# Patient Record
Sex: FEMALE | Race: BLACK OR AFRICAN AMERICAN | NOT HISPANIC OR LATINO | Employment: FULL TIME | URBAN - METROPOLITAN AREA
[De-identification: names, ages, dates, MRNs, and addresses within clinical notes are randomized per-mention and may not be internally consistent; named-entity substitution may affect disease eponyms.]

---

## 2019-02-11 ENCOUNTER — PATIENT OUTREACH (OUTPATIENT)
Dept: ADMINISTRATIVE | Facility: HOSPITAL | Age: 49
End: 2019-02-11

## 2019-02-15 ENCOUNTER — OFFICE VISIT (OUTPATIENT)
Dept: INTERNAL MEDICINE | Facility: CLINIC | Age: 49
End: 2019-02-15
Payer: COMMERCIAL

## 2019-02-15 VITALS
OXYGEN SATURATION: 98 % | DIASTOLIC BLOOD PRESSURE: 92 MMHG | HEIGHT: 62 IN | HEART RATE: 73 BPM | BODY MASS INDEX: 43.16 KG/M2 | TEMPERATURE: 97 F | SYSTOLIC BLOOD PRESSURE: 142 MMHG | WEIGHT: 234.56 LBS

## 2019-02-15 DIAGNOSIS — Z12.39 BREAST CANCER SCREENING: ICD-10-CM

## 2019-02-15 DIAGNOSIS — E66.01 MORBID OBESITY WITH BMI OF 40.0-44.9, ADULT: ICD-10-CM

## 2019-02-15 DIAGNOSIS — N96 HISTORY OF RECURRENT MISCARRIAGES: ICD-10-CM

## 2019-02-15 DIAGNOSIS — I10 HYPERTENSION GOAL BP (BLOOD PRESSURE) < 140/90: Primary | ICD-10-CM

## 2019-02-15 DIAGNOSIS — F17.200 TOBACCO USE DISORDER: ICD-10-CM

## 2019-02-15 DIAGNOSIS — Z23 IMMUNIZATION DUE: ICD-10-CM

## 2019-02-15 PROCEDURE — 3008F PR BODY MASS INDEX (BMI) DOCUMENTED: ICD-10-PCS | Mod: CPTII,S$GLB,, | Performed by: INTERNAL MEDICINE

## 2019-02-15 PROCEDURE — 3080F DIAST BP >= 90 MM HG: CPT | Mod: CPTII,S$GLB,, | Performed by: INTERNAL MEDICINE

## 2019-02-15 PROCEDURE — 99204 OFFICE O/P NEW MOD 45 MIN: CPT | Mod: 25,S$GLB,, | Performed by: INTERNAL MEDICINE

## 2019-02-15 PROCEDURE — 3077F PR MOST RECENT SYSTOLIC BLOOD PRESSURE >= 140 MM HG: ICD-10-PCS | Mod: CPTII,S$GLB,, | Performed by: INTERNAL MEDICINE

## 2019-02-15 PROCEDURE — 3080F PR MOST RECENT DIASTOLIC BLOOD PRESSURE >= 90 MM HG: ICD-10-PCS | Mod: CPTII,S$GLB,, | Performed by: INTERNAL MEDICINE

## 2019-02-15 PROCEDURE — 3008F BODY MASS INDEX DOCD: CPT | Mod: CPTII,S$GLB,, | Performed by: INTERNAL MEDICINE

## 2019-02-15 PROCEDURE — 99999 PR PBB SHADOW E&M-EST. PATIENT-LVL IV: ICD-10-PCS | Mod: PBBFAC,,, | Performed by: INTERNAL MEDICINE

## 2019-02-15 PROCEDURE — 90715 TDAP VACCINE 7 YRS/> IM: CPT | Mod: S$GLB,,, | Performed by: INTERNAL MEDICINE

## 2019-02-15 PROCEDURE — 90471 TDAP VACCINE GREATER THAN OR EQUAL TO 7YO IM: ICD-10-PCS | Mod: S$GLB,,, | Performed by: INTERNAL MEDICINE

## 2019-02-15 PROCEDURE — 90715 TDAP VACCINE GREATER THAN OR EQUAL TO 7YO IM: ICD-10-PCS | Mod: S$GLB,,, | Performed by: INTERNAL MEDICINE

## 2019-02-15 PROCEDURE — 90471 IMMUNIZATION ADMIN: CPT | Mod: S$GLB,,, | Performed by: INTERNAL MEDICINE

## 2019-02-15 PROCEDURE — 3077F SYST BP >= 140 MM HG: CPT | Mod: CPTII,S$GLB,, | Performed by: INTERNAL MEDICINE

## 2019-02-15 PROCEDURE — 99204 PR OFFICE/OUTPT VISIT, NEW, LEVL IV, 45-59 MIN: ICD-10-PCS | Mod: 25,S$GLB,, | Performed by: INTERNAL MEDICINE

## 2019-02-15 PROCEDURE — 99999 PR PBB SHADOW E&M-EST. PATIENT-LVL IV: CPT | Mod: PBBFAC,,, | Performed by: INTERNAL MEDICINE

## 2019-02-15 RX ORDER — HYDROCHLOROTHIAZIDE 25 MG/1
25 TABLET ORAL DAILY
Qty: 30 TABLET | Refills: 3 | Status: SHIPPED | OUTPATIENT
Start: 2019-02-15 | End: 2019-07-30 | Stop reason: SDUPTHER

## 2019-02-15 NOTE — PROGRESS NOTES
Subjective:       Patient ID: Latonya Blevins is a 48 y.o. female.    Chief Complaint: Establish Care    48 y.o. Black or  female     Patient presents with:  Establish Care    HPI: Here today to establish care. She is new to [facility].   HTN--she reports having hypertension for many years. She was on medication in the past but not currently. She is having headaches on occasion. She is a smoker and is obese. She admits to poor dieting.   She would like to see a gynecologist due to recurrent miscarriages. She reports having 7 miscarriages with the most recent being a month ago.     Past Medical History:  Hypertension    Past Surgical History:   SECTION  DILATION AND CURETTAGE OF UTERUS    Review of patient's family history indicates:  Problem: Brain cancer      Relation: Mother          Age of Onset: (Not Specified)  Problem: Breast cancer      Relation: Mother          Age of Onset: (Not Specified)  Problem: Lung cancer      Relation: Father          Age of Onset: (Not Specified)  Problem: Brain cancer      Relation: Father          Age of Onset: (Not Specified)      No current outpatient medications on file prior to visit.  No current facility-administered medications on file prior to visit.       Allergies:   Review of patient's allergies indicates:  No Known Allergies              Review of Systems   Constitutional: Positive for fatigue. Negative for fever and unexpected weight change.   HENT: Negative for sore throat, trouble swallowing and voice change.    Respiratory: Negative for cough and shortness of breath.    Cardiovascular: Negative for chest pain, palpitations and leg swelling.   Gastrointestinal: Negative for abdominal pain, blood in stool, constipation and diarrhea.   Genitourinary: Negative for difficulty urinating, dysuria and menstrual problem.   Musculoskeletal: Positive for back pain. Negative for arthralgias and gait problem.   Neurological: Positive for headaches.  Negative for dizziness.   Psychiatric/Behavioral: Negative for dysphoric mood and sleep disturbance. The patient is not nervous/anxious.        Objective:      Physical Exam   Constitutional: She is oriented to person, place, and time. She appears well-developed and well-nourished. No distress.   HENT:   Mouth/Throat: No oropharyngeal exudate.   Eyes: No scleral icterus.   Neck: No tracheal deviation present. No thyromegaly present.   Cardiovascular: Normal rate, regular rhythm and normal heart sounds.   Pulmonary/Chest: Effort normal and breath sounds normal. No respiratory distress. She has no wheezes. She has no rales.   Abdominal: Soft. Bowel sounds are normal.   Musculoskeletal: She exhibits no edema.   Lymphadenopathy:     She has no cervical adenopathy.   Neurological: She is alert and oriented to person, place, and time.   Skin: Skin is warm and dry.   Hirsutism on chin    Psychiatric: She has a normal mood and affect.   Vitals reviewed.      Assessment:       1. Hypertension goal BP (blood pressure) < 140/90    2. Tobacco use disorder    3. Morbid obesity with BMI of 40.0-44.9, adult    4. History of recurrent miscarriages    5. Breast cancer screening    6. Immunization due        Plan:       Latonya was seen today for establish care.    Diagnoses and all orders for this visit:    Hypertension goal BP (blood pressure) < 140/90  -     hydroCHLOROthiazide (HYDRODIURIL) 25 MG tablet; Take 1 tablet (25 mg total) by mouth once daily. Discussed medication risks and benefits.   -     Recommend smoking cessation  -     Lifestyle modifications discussed  -     Comprehensive metabolic panel; Future  -     TSH; Future  -     CBC auto differential; Future  -     Lipid panel; Future    Tobacco use disorder  -     Recommend cessation    Morbid obesity with BMI of 40.0-44.9, adult  -     Lifestyle modifications discussed    History of recurrent miscarriages  -     Ambulatory consult to Gynecology    Breast cancer  screening  -     Mammo Digital Screening Bilat; Future    Immunization due  -     (In Office Administered) Tdap Vaccine    Obtain Pap smear report.     RTC in 2 weeks for b/p recheck.

## 2019-02-15 NOTE — PATIENT INSTRUCTIONS
Hydrochlorothiazide, HCTZ capsules or tablets  What is this medicine?  HYDROCHLOROTHIAZIDE (kyara droe klor oh THYE a zide) is a diuretic. It increases the amount of urine passed, which causes the body to lose salt and water. This medicine is used to treat high blood pressure. It is also reduces the swelling and water retention caused by various medical conditions, such as heart, liver, or kidney disease.  How should I use this medicine?  Take this medicine by mouth with a glass of water. Follow the directions on the prescription label. Take your medicine at regular intervals. Remember that you will need to pass urine frequently after taking this medicine. Do not take your doses at a time of day that will cause you problems. Do not stop taking your medicine unless your doctor tells you to.  Talk to your pediatrician regarding the use of this medicine in children. Special care may be needed.  What side effects may I notice from receiving this medicine?  Side effects that you should report to your doctor or health care professional as soon as possible:  · allergic reactions such as skin rash or itching, hives, swelling of the lips, mouth, tongue, or throat  · changes in vision  · chest pain  · eye pain  · fast or irregular heartbeat  · feeling faint or lightheaded, falls  · gout attack  · muscle pain or cramps  · pain or difficulty when passing urine  · pain, tingling, numbness in the hands or feet  · redness, blistering, peeling or loosening of the skin, including inside the mouth  · unusually weak or tired  Side effects that usually do not require medical attention (report to your doctor or health care professional if they continue or are bothersome):  · change in sex drive or performance  · dry mouth  · headache  · stomach upset  What may interact with this medicine?  · cholestyramine  · colestipol  · digoxin  · dofetilide  · lithium  · medicines for blood pressure  · medicines for diabetes  · medicines that relax  muscles for surgery  · other diuretics  · steroid medicines like prednisone or cortisone  What if I miss a dose?  If you miss a dose, take it as soon as you can. If it is almost time for your next dose, take only that dose. Do not take double or extra doses.  Where should I keep my medicine?  Keep out of the reach of children.  Store at room temperature between 15 and 30 degrees C (59 and 86 degrees F). Do not freeze. Protect from light and moisture. Keep container closed tightly. Throw away any unused medicine after the expiration date.  What should I tell my health care provider before I take this medicine?  They need to know if you have any of these conditions:  · diabetes  · gout  · immune system problems, like lupus  · kidney disease or kidney stones  · liver disease  · pancreatitis  · small amount of urine or difficulty passing urine  · an unusual or allergic reaction to hydrochlorothiazide, sulfa drugs, other medicines, foods, dyes, or preservatives  · pregnant or trying to get pregnant  · breast-feeding  What should I watch for while using this medicine?  Visit your doctor or health care professional for regular checks on your progress. Check your blood pressure as directed. Ask your doctor or health care professional what your blood pressure should be and when you should contact him or her.  You may need to be on a special diet while taking this medicine. Ask your doctor.  Check with your doctor or health care professional if you get an attack of severe diarrhea, nausea and vomiting, or if you sweat a lot. The loss of too much body fluid can make it dangerous for you to take this medicine.  You may get drowsy or dizzy. Do not drive, use machinery, or do anything that needs mental alertness until you know how this medicine affects you. Do not stand or sit up quickly, especially if you are an older patient. This reduces the risk of dizzy or fainting spells. Alcohol may interfere with the effect of this  medicine. Avoid alcoholic drinks.  This medicine may affect your blood sugar level. If you have diabetes, check with your doctor or health care professional before changing the dose of your diabetic medicine.  This medicine can make you more sensitive to the sun. Keep out of the sun. If you cannot avoid being in the sun, wear protective clothing and use sunscreen. Do not use sun lamps or tanning beds/booths.  NOTE:This sheet is a summary. It may not cover all possible information. If you have questions about this medicine, talk to your doctor, pharmacist, or health care provider. Copyright© 2017 [employer]        Uncontrolled High Blood Pressure (Established)    Your blood pressure was unusually high today. This can occur if youve missed doses of your blood pressure medicine. Or it can happen if you are taking other medicines. These include some asthma inhalers, decongestants, diet pills, and street drugs like cocaine and amphetamine.  Other causes include:  · Weight gain  · More salt in your diet  · Smoking  · Caffeine  Your blood pressure can also rise if you are emotionally upset or in intense pain. It may go back to normal after a period of rest.  A blood pressure reading is made up of 2 numbers. There is a top number over a bottom number. The top number is the systolic pressure. The bottom number is the diastolic pressure. A normal blood pressure is a systolic pressure of less than 120 over a diastolic pressure of less than 80. High blood pressure (hypertension) is when the top number is 140 or higher. Or it is when the bottom number is 90 or higher. You will see your blood pressure readings written together. For example, a person with a systolic pressure of 118 and a diastolic pressure of 78 will have 118/78 written in the medical record. To be high blood pressure, the numbers must be higher when tested over a period of time. The blood pressures between normal and hypertension are called prehypertension.  Prehypertension is a warning sign. The information gives you a chance to make lifestyle changes (weight loss, more exercise) that can keep your blood pressure from going higher.  Home care  Its important to take steps to lower your blood pressure. If you are taking blood pressure medicine, the guidelines below may help you need less or no medicines in the future.  · Begin a weight-loss program if you are overweight.  · Cut back on the amount of salt in your diet:  ¨ Avoid high-salt foods like olives, pickles, smoked meats, and salted potato chips.  ¨ Dont add salt to your food at the table.  ¨ Use only small amounts of salt when cooking.  · Begin an exercise program. Talk with your health care provider about what exercise program is best for you. It doesnt have to be difficult. Even brisk walking for 20 minutes 3 times a week is a good form of exercise.  · Avoid medicines that stimulates the heart. This includes many over-the-counter cold and sinus decongestant pills and sprays, as well as diet pills. Check the warnings about hypertension on the label. Before purchasing any over-the-counter medicines or supplements, always ask the pharmacist about the product's potential interaction with your high blood pressure and your medicines.  · Stimulants such as amphetamine or cocaine could be lethal for someone with hypertension. Never take these.  · Limit how much caffeine you drink. Or switch to noncaffeinated beverages.  · Stop smoking. If you are a long-time smoker, this can be hard. Enroll in a stop-smoking program to make it more likely that you will succeed. Talk with your provider about ways to quit.  · Learn how to handle stress better. This is an important part of any program to lower blood pressure. Learn ways to relax. These include meditation, yoga, and biofeedback.  · If medicines were prescribed, take them exactly as directed. Missing doses may cause your blood pressure to get out of control.  · If you  miss a dose or doses of your medicines, check with your healthcare provider or pharmacist about what to do.  · Consider buying an automatic blood pressure machine. Your provider may recommend a certain type. You can get one of these at most pharmacies. Measure your blood pressure twice a day, in the morning, and in the late afternoon. Keep a written record of your home blood pressure readings and take the record to your medical appointments.  Here are some additional guidelines on home blood pressure monitoring from the American [employer] Association.  · Don't smoke or drink coffee for 30 minutes  · Go to the bathroom before the test.  · Relax for 5 minutes before taking the measurement.  · Sit correctly. Be sure your back is supported. Don't sit on a couch or soft chair. Uncross your feet and place them flat on the floor. Place your arm on a solid, flat surface like a table with the upper arm at heart level. Make certain the middle of the cuff is directly above the eye of the elbow. Check the monitor's instruction manual for an illustration.  · Take multiple readings. When you measure, take 2 or 3 readings one minute apart and record all of the results.  · Take your blood pressure at the same time every day, or as your healthcare provider recommends.  · Record the date, time, and blood pressure reading.  · Take the record with you to your next appointment. If your blood pressure monitor has a built-in memory, simply take the monitor with you to your next appointment.  · Call your provider if you have several high readings. Don't be frightened by a single high reading, but if you get several high readings, check in with your healthcare provider.  · Note: When blood pressure reaches a systolic (top number) of 180 or higher or a diastolic (bottom number) of 110 or higher, emergency medical treatment is required. Call your healthcare provider immediately.  Follow-up care  Regular visits to your own healthcare provider for  blood pressure and medicine checks are an important part of your care. Make a follow-up appointment as directed. Bring the record of your home blood pressure readings to the appointment.  When to seek medical advice  Call your healthcare provider right away if any of these occur:  · Blood pressure reaches a systolic (top number) of 180 or higher or diastolic (bottom number) of 110 or higher, emergency medical treatment is required.  · Chest, arm, shoulder, neck, or upper back pain  · Shortness of breath  · Severe headache  · Throbbing or rushing sound in the ears  · Nosebleed  · Extreme drowsiness, confusion, or fainting  · Dizziness or dizziness with spinning sensation (vertigo)  · Weakness in an arm or leg or on one side of the face  · Trouble speaking or seeing   Date Last Reviewed: 1/1/2017  © 3862-4095 [employer]. [address] [place] [address], [place] [address]. All rights reserved. This information is not intended as a substitute for professional medical care. Always follow your healthcare professional's instructions.

## 2019-03-19 ENCOUNTER — LAB VISIT (OUTPATIENT)
Dept: LAB | Facility: HOSPITAL | Age: 49
End: 2019-03-19
Attending: INTERNAL MEDICINE
Payer: COMMERCIAL

## 2019-03-19 DIAGNOSIS — I10 HYPERTENSION GOAL BP (BLOOD PRESSURE) < 140/90: ICD-10-CM

## 2019-03-19 LAB
ALBUMIN SERPL BCP-MCNC: 3.7 G/DL
ALP SERPL-CCNC: 76 U/L
ALT SERPL W/O P-5'-P-CCNC: 11 U/L
ANION GAP SERPL CALC-SCNC: 7 MMOL/L
AST SERPL-CCNC: 14 U/L
BASOPHILS # BLD AUTO: 0.03 K/UL
BASOPHILS NFR BLD: 0.4 %
BILIRUB SERPL-MCNC: 0.4 MG/DL
BUN SERPL-MCNC: 11 MG/DL
CALCIUM SERPL-MCNC: 9.5 MG/DL
CHLORIDE SERPL-SCNC: 109 MMOL/L
CHOLEST SERPL-MCNC: 153 MG/DL
CHOLEST/HDLC SERPL: 3.6 {RATIO}
CO2 SERPL-SCNC: 23 MMOL/L
CREAT SERPL-MCNC: 0.9 MG/DL
DIFFERENTIAL METHOD: NORMAL
EOSINOPHIL # BLD AUTO: 0.2 K/UL
EOSINOPHIL NFR BLD: 2.6 %
ERYTHROCYTE [DISTWIDTH] IN BLOOD BY AUTOMATED COUNT: 14.5 %
EST. GFR  (AFRICAN AMERICAN): >60 ML/MIN/1.73 M^2
EST. GFR  (NON AFRICAN AMERICAN): >60 ML/MIN/1.73 M^2
GLUCOSE SERPL-MCNC: 119 MG/DL
HCT VFR BLD AUTO: 37.5 %
HDLC SERPL-MCNC: 42 MG/DL
HDLC SERPL: 27.5 %
HGB BLD-MCNC: 12.3 G/DL
LDLC SERPL CALC-MCNC: 93 MG/DL
LYMPHOCYTES # BLD AUTO: 2.1 K/UL
LYMPHOCYTES NFR BLD: 25.5 %
MCH RBC QN AUTO: 28.8 PG
MCHC RBC AUTO-ENTMCNC: 32.8 G/DL
MCV RBC AUTO: 88 FL
MONOCYTES # BLD AUTO: 0.5 K/UL
MONOCYTES NFR BLD: 5.7 %
NEUTROPHILS # BLD AUTO: 5.3 K/UL
NEUTROPHILS NFR BLD: 65.8 %
NONHDLC SERPL-MCNC: 111 MG/DL
PLATELET # BLD AUTO: 238 K/UL
PMV BLD AUTO: 11.1 FL
POTASSIUM SERPL-SCNC: 4.5 MMOL/L
PROT SERPL-MCNC: 6.8 G/DL
RBC # BLD AUTO: 4.27 M/UL
SODIUM SERPL-SCNC: 139 MMOL/L
TRIGL SERPL-MCNC: 90 MG/DL
TSH SERPL DL<=0.005 MIU/L-ACNC: 1.74 UIU/ML
WBC # BLD AUTO: 8.07 K/UL

## 2019-03-19 PROCEDURE — 84443 ASSAY THYROID STIM HORMONE: CPT

## 2019-03-19 PROCEDURE — 36415 COLL VENOUS BLD VENIPUNCTURE: CPT

## 2019-03-19 PROCEDURE — 80053 COMPREHEN METABOLIC PANEL: CPT

## 2019-03-19 PROCEDURE — 85025 COMPLETE CBC W/AUTO DIFF WBC: CPT

## 2019-03-19 PROCEDURE — 80061 LIPID PANEL: CPT

## 2019-03-22 ENCOUNTER — TELEPHONE (OUTPATIENT)
Dept: INTERNAL MEDICINE | Facility: CLINIC | Age: 49
End: 2019-03-22

## 2019-03-22 DIAGNOSIS — R73.01 IMPAIRED FASTING GLUCOSE: Primary | ICD-10-CM

## 2019-04-29 ENCOUNTER — OFFICE VISIT (OUTPATIENT)
Dept: INTERNAL MEDICINE | Facility: CLINIC | Age: 49
End: 2019-04-29
Payer: COMMERCIAL

## 2019-04-29 VITALS
BODY MASS INDEX: 43.49 KG/M2 | SYSTOLIC BLOOD PRESSURE: 132 MMHG | OXYGEN SATURATION: 98 % | TEMPERATURE: 97 F | HEIGHT: 62 IN | DIASTOLIC BLOOD PRESSURE: 82 MMHG | WEIGHT: 236.31 LBS | HEART RATE: 81 BPM

## 2019-04-29 DIAGNOSIS — R73.01 IMPAIRED FASTING GLUCOSE: ICD-10-CM

## 2019-04-29 DIAGNOSIS — I10 HYPERTENSION GOAL BP (BLOOD PRESSURE) < 140/90: Primary | ICD-10-CM

## 2019-04-29 DIAGNOSIS — R41.3 MEMORY PROBLEM: ICD-10-CM

## 2019-04-29 DIAGNOSIS — E66.01 MORBID OBESITY WITH BMI OF 40.0-44.9, ADULT: ICD-10-CM

## 2019-04-29 PROCEDURE — 99214 PR OFFICE/OUTPT VISIT, EST, LEVL IV, 30-39 MIN: ICD-10-PCS | Mod: S$GLB,,, | Performed by: INTERNAL MEDICINE

## 2019-04-29 PROCEDURE — 3075F SYST BP GE 130 - 139MM HG: CPT | Mod: CPTII,S$GLB,, | Performed by: INTERNAL MEDICINE

## 2019-04-29 PROCEDURE — 99999 PR PBB SHADOW E&M-EST. PATIENT-LVL III: ICD-10-PCS | Mod: PBBFAC,,, | Performed by: INTERNAL MEDICINE

## 2019-04-29 PROCEDURE — 3008F PR BODY MASS INDEX (BMI) DOCUMENTED: ICD-10-PCS | Mod: CPTII,S$GLB,, | Performed by: INTERNAL MEDICINE

## 2019-04-29 PROCEDURE — 3008F BODY MASS INDEX DOCD: CPT | Mod: CPTII,S$GLB,, | Performed by: INTERNAL MEDICINE

## 2019-04-29 PROCEDURE — 3075F PR MOST RECENT SYSTOLIC BLOOD PRESS GE 130-139MM HG: ICD-10-PCS | Mod: CPTII,S$GLB,, | Performed by: INTERNAL MEDICINE

## 2019-04-29 PROCEDURE — 3079F DIAST BP 80-89 MM HG: CPT | Mod: CPTII,S$GLB,, | Performed by: INTERNAL MEDICINE

## 2019-04-29 PROCEDURE — 3079F PR MOST RECENT DIASTOLIC BLOOD PRESSURE 80-89 MM HG: ICD-10-PCS | Mod: CPTII,S$GLB,, | Performed by: INTERNAL MEDICINE

## 2019-04-29 PROCEDURE — 99999 PR PBB SHADOW E&M-EST. PATIENT-LVL III: CPT | Mod: PBBFAC,,, | Performed by: INTERNAL MEDICINE

## 2019-04-29 PROCEDURE — 99214 OFFICE O/P EST MOD 30 MIN: CPT | Mod: S$GLB,,, | Performed by: INTERNAL MEDICINE

## 2019-04-29 NOTE — PROGRESS NOTES
Latonya Mendoza August  48 y.o. Black or  female    HPI: Presents to the clinic to follow up on hypertension. Her b/p is controlled on HCTZ. She states she gets higher readings at home. She does not have her cuff with her today.   Review of recent labs show that her glucose was 119. She denies a history of diabetes. She denies a family history of diabetes. She denies symptoms.   She has trouble with her short term memory. She is concerned. She admits to being under a lot of stress. Recent TSH was within normal range.     Past Medical History:   Diagnosis Date    Hypertension        Current Outpatient Medications:     hydroCHLOROthiazide (HYDRODIURIL) 25 MG tablet, Take 1 tablet (25 mg total) by mouth once daily., Disp: 30 tablet, Rfl: 3    Review of patient's allergies indicates:  No Known Allergies    ROS: Denies dizziness, headache, chest pain, shortness of breath or edema    PE:  GEN: Alert and oriented, no acute distress  HEART: Normal S1 and S2, RRR, no lower extremity edema  LUNGS: Respirations unlabored, bilaterally clear to auscultation    ASSESSMENT/PLAN:  Latonya was seen today for hypertension.    Diagnoses and all orders for this visit:    Hypertension goal BP (blood pressure) < 140/90  -     Continue HCTZ    Impaired fasting glucose  -     Glucose, fasting; Future    Memory problem  -     Vitamin B12; Future  -     RPR; Future    Morbid obesity with BMI of 40.0-44.9, adult  -     Lifestyle modifications discussed    RTC in 2 weeks for b/p cuff check and labs.

## 2019-04-30 ENCOUNTER — PATIENT MESSAGE (OUTPATIENT)
Dept: OBSTETRICS AND GYNECOLOGY | Facility: CLINIC | Age: 49
End: 2019-04-30

## 2019-05-13 ENCOUNTER — CLINICAL SUPPORT (OUTPATIENT)
Dept: INTERNAL MEDICINE | Facility: CLINIC | Age: 49
End: 2019-05-13
Payer: COMMERCIAL

## 2019-05-13 ENCOUNTER — LAB VISIT (OUTPATIENT)
Dept: LAB | Facility: HOSPITAL | Age: 49
End: 2019-05-13
Payer: COMMERCIAL

## 2019-05-13 VITALS — HEART RATE: 73 BPM | DIASTOLIC BLOOD PRESSURE: 106 MMHG | SYSTOLIC BLOOD PRESSURE: 144 MMHG

## 2019-05-13 DIAGNOSIS — R73.01 IMPAIRED FASTING GLUCOSE: ICD-10-CM

## 2019-05-13 DIAGNOSIS — R41.3 MEMORY PROBLEM: ICD-10-CM

## 2019-05-13 DIAGNOSIS — I10 HYPERTENSION, UNSPECIFIED TYPE: Primary | ICD-10-CM

## 2019-05-13 LAB
ESTIMATED AVG GLUCOSE: 111 MG/DL (ref 68–131)
GLUCOSE SERPL-MCNC: 100 MG/DL (ref 70–110)
HBA1C MFR BLD HPLC: 5.5 % (ref 4–5.6)
VIT B12 SERPL-MCNC: 235 PG/ML (ref 210–950)

## 2019-05-13 PROCEDURE — 36415 COLL VENOUS BLD VENIPUNCTURE: CPT

## 2019-05-13 PROCEDURE — 82607 VITAMIN B-12: CPT

## 2019-05-13 PROCEDURE — 83036 HEMOGLOBIN GLYCOSYLATED A1C: CPT

## 2019-05-13 PROCEDURE — 86592 SYPHILIS TEST NON-TREP QUAL: CPT

## 2019-05-13 PROCEDURE — 82947 ASSAY GLUCOSE BLOOD QUANT: CPT

## 2019-05-13 PROCEDURE — 99999 PR PBB SHADOW E&M-EST. PATIENT-LVL I: CPT | Mod: PBBFAC,,,

## 2019-05-13 PROCEDURE — 99999 PR PBB SHADOW E&M-EST. PATIENT-LVL I: ICD-10-PCS | Mod: PBBFAC,,,

## 2019-05-13 NOTE — PATIENT INSTRUCTIONS
Spoke to Dr Laguerre about pt b/p, pt states she has no symptoms, was told to take meds, bring b/p cuff and come back in 1 week to get a recheck of b/p, verbalized understanding.

## 2019-05-13 NOTE — PROGRESS NOTES
BP:144/106    Patient stated that she hasn't taken her medication this morning due to having blood work.

## 2019-05-14 LAB — RPR SER QL: NORMAL

## 2019-05-21 ENCOUNTER — CLINICAL SUPPORT (OUTPATIENT)
Dept: INTERNAL MEDICINE | Facility: CLINIC | Age: 49
End: 2019-05-21
Payer: COMMERCIAL

## 2019-05-21 ENCOUNTER — OFFICE VISIT (OUTPATIENT)
Dept: INTERNAL MEDICINE | Facility: CLINIC | Age: 49
End: 2019-05-21
Payer: COMMERCIAL

## 2019-05-21 VITALS
BODY MASS INDEX: 43.23 KG/M2 | SYSTOLIC BLOOD PRESSURE: 144 MMHG | HEART RATE: 84 BPM | OXYGEN SATURATION: 98 % | TEMPERATURE: 98 F | DIASTOLIC BLOOD PRESSURE: 106 MMHG | HEART RATE: 83 BPM | HEIGHT: 62 IN | SYSTOLIC BLOOD PRESSURE: 154 MMHG | DIASTOLIC BLOOD PRESSURE: 112 MMHG

## 2019-05-21 DIAGNOSIS — J32.9 SINUSITIS, UNSPECIFIED CHRONICITY, UNSPECIFIED LOCATION: ICD-10-CM

## 2019-05-21 DIAGNOSIS — I10 HYPERTENSION GOAL BP (BLOOD PRESSURE) < 140/90: Primary | ICD-10-CM

## 2019-05-21 PROCEDURE — 99999 PR PBB SHADOW E&M-EST. PATIENT-LVL III: ICD-10-PCS | Mod: PBBFAC,,, | Performed by: PHYSICIAN ASSISTANT

## 2019-05-21 PROCEDURE — 99214 PR OFFICE/OUTPT VISIT, EST, LEVL IV, 30-39 MIN: ICD-10-PCS | Mod: S$GLB,,, | Performed by: PHYSICIAN ASSISTANT

## 2019-05-21 PROCEDURE — 3077F PR MOST RECENT SYSTOLIC BLOOD PRESSURE >= 140 MM HG: ICD-10-PCS | Mod: CPTII,S$GLB,, | Performed by: PHYSICIAN ASSISTANT

## 2019-05-21 PROCEDURE — 99999 PR PBB SHADOW E&M-EST. PATIENT-LVL II: CPT | Mod: PBBFAC,,,

## 2019-05-21 PROCEDURE — 99214 OFFICE O/P EST MOD 30 MIN: CPT | Mod: S$GLB,,, | Performed by: PHYSICIAN ASSISTANT

## 2019-05-21 PROCEDURE — 3077F SYST BP >= 140 MM HG: CPT | Mod: CPTII,S$GLB,, | Performed by: PHYSICIAN ASSISTANT

## 2019-05-21 PROCEDURE — 3008F PR BODY MASS INDEX (BMI) DOCUMENTED: ICD-10-PCS | Mod: CPTII,S$GLB,, | Performed by: PHYSICIAN ASSISTANT

## 2019-05-21 PROCEDURE — 3080F PR MOST RECENT DIASTOLIC BLOOD PRESSURE >= 90 MM HG: ICD-10-PCS | Mod: CPTII,S$GLB,, | Performed by: PHYSICIAN ASSISTANT

## 2019-05-21 PROCEDURE — 3080F DIAST BP >= 90 MM HG: CPT | Mod: CPTII,S$GLB,, | Performed by: PHYSICIAN ASSISTANT

## 2019-05-21 PROCEDURE — 99999 PR PBB SHADOW E&M-EST. PATIENT-LVL II: ICD-10-PCS | Mod: PBBFAC,,,

## 2019-05-21 PROCEDURE — 3008F BODY MASS INDEX DOCD: CPT | Mod: CPTII,S$GLB,, | Performed by: PHYSICIAN ASSISTANT

## 2019-05-21 PROCEDURE — 99999 PR PBB SHADOW E&M-EST. PATIENT-LVL III: CPT | Mod: PBBFAC,,, | Performed by: PHYSICIAN ASSISTANT

## 2019-05-21 RX ORDER — LABETALOL 100 MG/1
100 TABLET, FILM COATED ORAL 2 TIMES DAILY
Qty: 60 TABLET | Refills: 11 | Status: SHIPPED | OUTPATIENT
Start: 2019-05-21 | End: 2019-06-28

## 2019-05-21 RX ORDER — AMOXICILLIN AND CLAVULANATE POTASSIUM 875; 125 MG/1; MG/1
1 TABLET, FILM COATED ORAL 2 TIMES DAILY
Qty: 20 TABLET | Refills: 0 | Status: SHIPPED | OUTPATIENT
Start: 2019-05-21 | End: 2019-05-31

## 2019-05-21 NOTE — PROGRESS NOTES
Patient pressure is still high. She is having sinus issues and headaches. She will see Mr. Ellis for an appt now.

## 2019-05-21 NOTE — PROGRESS NOTES
Subjective:       Patient ID: Latonya Blevins is a 48 y.o. female.    Chief Complaint: Headache (PCP - [facility]); Sinus Problem; and Hypertension    Hypertension   This is a chronic problem. The current episode started more than 1 year ago. The problem has been waxing and waning since onset. The problem is uncontrolled. Associated symptoms include anxiety and headaches. Pertinent negatives include no blurred vision, chest pain, malaise/fatigue, neck pain, orthopnea, palpitations, peripheral edema, PND, shortness of breath or sweats. Risk factors for coronary artery disease include family history, obesity and stress. Past treatments include diuretics. The current treatment provides mild improvement. Compliance problems include diet and exercise.    Sinusitis   This is a new problem. The current episode started in the past 7 days. The problem has been gradually worsening since onset. There has been no fever. Associated symptoms include congestion, coughing, headaches, sinus pressure and a sore throat. Pertinent negatives include no chills, neck pain or shortness of breath. Past treatments include nothing.     Past Medical History:   Diagnosis Date    Hypertension        Review of Systems   Constitutional: Negative for chills, fatigue, fever and malaise/fatigue.   HENT: Positive for congestion, postnasal drip, sinus pressure, sinus pain and sore throat.    Eyes: Negative for blurred vision.   Respiratory: Positive for cough. Negative for chest tightness and shortness of breath.    Cardiovascular: Negative for chest pain, palpitations, orthopnea and PND.   Gastrointestinal: Negative for abdominal pain, constipation and nausea.   Musculoskeletal: Negative for neck pain.   Neurological: Positive for headaches.       Objective:      Physical Exam   Constitutional: She appears well-developed and well-nourished. No distress.   HENT:   Head: Normocephalic and atraumatic.   Right Ear: Tympanic membrane and ear canal  normal.   Left Ear: Tympanic membrane and ear canal normal.   Nose: Mucosal edema and rhinorrhea present.   Mouth/Throat: Uvula is midline, oropharynx is clear and moist and mucous membranes are normal. No tonsillar exudate.   Neck: Neck supple.   Cardiovascular: Normal rate and regular rhythm. Exam reveals no gallop and no friction rub.   No murmur heard.  Pulmonary/Chest: Effort normal and breath sounds normal. No stridor. No respiratory distress. She has no wheezes. She has no rales. She exhibits no tenderness.   Abdominal: Soft. There is no tenderness.   Lymphadenopathy:     She has no cervical adenopathy.   Skin: She is not diaphoretic.   Nursing note and vitals reviewed.      Assessment:       1. Hypertension goal BP (blood pressure) < 140/90    2. Sinusitis, unspecified chronicity, unspecified location        Plan:       Hypertension goal BP (blood pressure) < 140/90 expresses the desire to get pregnant.   labetalol (NORMODYNE) 100 MG tablet; Take 1 tablet (100 mg total) by mouth 2 (two) times daily.  Dispense: 60 tablet; Refill: 11    Sinusitis, unspecified chronicity, unspecified location       -     amoxicillin-clavulanate 875-125mg (AUGMENTIN) 875-125 mg per tablet; Take 1 tablet by mouth 2 (two) times daily. for 10 days  Dispense: 20 tablet; Refill: 0

## 2019-06-28 ENCOUNTER — OFFICE VISIT (OUTPATIENT)
Dept: INTERNAL MEDICINE | Facility: CLINIC | Age: 49
End: 2019-06-28
Payer: COMMERCIAL

## 2019-06-28 VITALS
WEIGHT: 241.19 LBS | OXYGEN SATURATION: 99 % | HEIGHT: 62 IN | HEART RATE: 87 BPM | BODY MASS INDEX: 44.38 KG/M2 | SYSTOLIC BLOOD PRESSURE: 142 MMHG | DIASTOLIC BLOOD PRESSURE: 88 MMHG | TEMPERATURE: 97 F

## 2019-06-28 DIAGNOSIS — I10 HYPERTENSION GOAL BP (BLOOD PRESSURE) < 140/90: Primary | ICD-10-CM

## 2019-06-28 DIAGNOSIS — E66.01 MORBID OBESITY WITH BMI OF 40.0-44.9, ADULT: ICD-10-CM

## 2019-06-28 DIAGNOSIS — R41.3 MEMORY PROBLEM: ICD-10-CM

## 2019-06-28 PROCEDURE — 99999 PR PBB SHADOW E&M-EST. PATIENT-LVL III: ICD-10-PCS | Mod: PBBFAC,,, | Performed by: INTERNAL MEDICINE

## 2019-06-28 PROCEDURE — 3079F DIAST BP 80-89 MM HG: CPT | Mod: CPTII,S$GLB,, | Performed by: INTERNAL MEDICINE

## 2019-06-28 PROCEDURE — 3008F PR BODY MASS INDEX (BMI) DOCUMENTED: ICD-10-PCS | Mod: CPTII,S$GLB,, | Performed by: INTERNAL MEDICINE

## 2019-06-28 PROCEDURE — 3008F BODY MASS INDEX DOCD: CPT | Mod: CPTII,S$GLB,, | Performed by: INTERNAL MEDICINE

## 2019-06-28 PROCEDURE — 3079F PR MOST RECENT DIASTOLIC BLOOD PRESSURE 80-89 MM HG: ICD-10-PCS | Mod: CPTII,S$GLB,, | Performed by: INTERNAL MEDICINE

## 2019-06-28 PROCEDURE — 3077F PR MOST RECENT SYSTOLIC BLOOD PRESSURE >= 140 MM HG: ICD-10-PCS | Mod: CPTII,S$GLB,, | Performed by: INTERNAL MEDICINE

## 2019-06-28 PROCEDURE — 99214 OFFICE O/P EST MOD 30 MIN: CPT | Mod: S$GLB,,, | Performed by: INTERNAL MEDICINE

## 2019-06-28 PROCEDURE — 3077F SYST BP >= 140 MM HG: CPT | Mod: CPTII,S$GLB,, | Performed by: INTERNAL MEDICINE

## 2019-06-28 PROCEDURE — 99999 PR PBB SHADOW E&M-EST. PATIENT-LVL III: CPT | Mod: PBBFAC,,, | Performed by: INTERNAL MEDICINE

## 2019-06-28 PROCEDURE — 99214 PR OFFICE/OUTPT VISIT, EST, LEVL IV, 30-39 MIN: ICD-10-PCS | Mod: S$GLB,,, | Performed by: INTERNAL MEDICINE

## 2019-06-28 RX ORDER — LABETALOL 200 MG/1
200 TABLET, FILM COATED ORAL 2 TIMES DAILY
Qty: 60 TABLET | Refills: 3 | Status: SHIPPED | OUTPATIENT
Start: 2019-06-28 | End: 2019-07-30 | Stop reason: SDUPTHER

## 2019-06-28 RX ORDER — LANOLIN ALCOHOL/MO/W.PET/CERES
1000 CREAM (GRAM) TOPICAL DAILY
COMMUNITY
Start: 2019-06-28 | End: 2020-08-25

## 2019-06-28 NOTE — PATIENT INSTRUCTIONS
Uncontrolled High Blood Pressure (Established)    Your blood pressure was unusually high today. This can occur if youve missed doses of your blood pressure medicine. Or it can happen if you are taking other medicines. These include some asthma inhalers, decongestants, diet pills, and street drugs like cocaine and amphetamine.  Other causes include:  · Weight gain  · More salt in your diet  · Smoking  · Caffeine  Your blood pressure can also rise if you are emotionally upset or in intense pain. It may go back to normal after a period of rest.  A blood pressure reading is made up of 2 numbers. There is a top number over a bottom number. The top number is the systolic pressure. The bottom number is the diastolic pressure. A normal blood pressure is a systolic pressure of less than 120 over a diastolic pressure of less than 80. High blood pressure (hypertension) is when the top number is 140 or higher. Or it is when the bottom number is 90 or higher. You will see your blood pressure readings written together. For example, a person with a systolic pressure of 118 and a diastolic pressure of 78 will have 118/78 written in the medical record. To be high blood pressure, the numbers must be higher when tested over a period of time. The blood pressures between normal and hypertension are called prehypertension. Prehypertension is a warning sign. The information gives you a chance to make lifestyle changes (weight loss, more exercise) that can keep your blood pressure from going higher.  Home care  Its important to take steps to lower your blood pressure. If you are taking blood pressure medicine, the guidelines below may help you need less or no medicines in the future.  · Begin a weight-loss program if you are overweight.  · Cut back on the amount of salt in your diet:  ¨ Avoid high-salt foods like olives, pickles, smoked meats, and salted potato chips.  ¨ Dont add salt to your food at the table.  ¨ Use only small  amounts of salt when cooking.  · Begin an exercise program. Talk with your health care provider about what exercise program is best for you. It doesnt have to be difficult. Even brisk walking for 20 minutes 3 times a week is a good form of exercise.  · Avoid medicines that stimulates the heart. This includes many over-the-counter cold and sinus decongestant pills and sprays, as well as diet pills. Check the warnings about hypertension on the label. Before purchasing any over-the-counter medicines or supplements, always ask the pharmacist about the product's potential interaction with your high blood pressure and your medicines.  · Stimulants such as amphetamine or cocaine could be lethal for someone with hypertension. Never take these.  · Limit how much caffeine you drink. Or switch to noncaffeinated beverages.  · Stop smoking. If you are a long-time smoker, this can be hard. Enroll in a stop-smoking program to make it more likely that you will succeed. Talk with your provider about ways to quit.  · Learn how to handle stress better. This is an important part of any program to lower blood pressure. Learn ways to relax. These include meditation, yoga, and biofeedback.  · If medicines were prescribed, take them exactly as directed. Missing doses may cause your blood pressure to get out of control.  · If you miss a dose or doses of your medicines, check with your healthcare provider or pharmacist about what to do.  · Consider buying an automatic blood pressure machine. Your provider may recommend a certain type. You can get one of these at most pharmacies. Measure your blood pressure twice a day, in the morning, and in the late afternoon. Keep a written record of your home blood pressure readings and take the record to your medical appointments.  Here are some additional guidelines on home blood pressure monitoring from the American [employer] Association.  · Don't smoke or drink coffee for 30 minutes  · Go to the bathroom  before the test.  · Relax for 5 minutes before taking the measurement.  · Sit correctly. Be sure your back is supported. Don't sit on a couch or soft chair. Uncross your feet and place them flat on the floor. Place your arm on a solid, flat surface like a table with the upper arm at heart level. Make certain the middle of the cuff is directly above the eye of the elbow. Check the monitor's instruction manual for an illustration.  · Take multiple readings. When you measure, take 2 or 3 readings one minute apart and record all of the results.  · Take your blood pressure at the same time every day, or as your healthcare provider recommends.  · Record the date, time, and blood pressure reading.  · Take the record with you to your next appointment. If your blood pressure monitor has a built-in memory, simply take the monitor with you to your next appointment.  · Call your provider if you have several high readings. Don't be frightened by a single high reading, but if you get several high readings, check in with your healthcare provider.  · Note: When blood pressure reaches a systolic (top number) of 180 or higher or a diastolic (bottom number) of 110 or higher, emergency medical treatment is required. Call your healthcare provider immediately.  Follow-up care  Regular visits to your own healthcare provider for blood pressure and medicine checks are an important part of your care. Make a follow-up appointment as directed. Bring the record of your home blood pressure readings to the appointment.  When to seek medical advice  Call your healthcare provider right away if any of these occur:  · Blood pressure reaches a systolic (top number) of 180 or higher or diastolic (bottom number) of 110 or higher, emergency medical treatment is required.  · Chest, arm, shoulder, neck, or upper back pain  · Shortness of breath  · Severe headache  · Throbbing or rushing sound in the ears  · Nosebleed  · Extreme drowsiness, confusion, or  fainting  · Dizziness or dizziness with spinning sensation (vertigo)  · Weakness in an arm or leg or on one side of the face  · Trouble speaking or seeing   Date Last Reviewed: 1/1/2017  © 8411-8694 [employer]. [address] [place] [address], [place] [address]. All rights reserved. This information is not intended as a substitute for professional medical care. Always follow your healthcare professional's instructions.

## 2019-06-28 NOTE — PROGRESS NOTES
Latonya Mendoza August  48 y.o. Black or  female    HPI: Presents to the clinic to follow up on hypertension. Her b/p is elevated both here and at home. She is taking labetalol 100 mg BID. She is not taking HCTZ. She states she is trying to get pregnant.   She continues to have memory problems. Her TSH was normal and her B 12 is low normal.   Lab Results   Component Value Date    TSH 1.739 03/19/2019     Lab Results   Component Value Date    AGMLLNEM39 235 05/13/2019       Past Medical History:   Diagnosis Date    Hypertension        Current Outpatient Medications:     labetalol (NORMODYNE) 200 MG tablet, Take 1 tablet (200 mg total) by mouth 2 (two) times daily., Disp: 60 tablet, Rfl: 3    cyanocobalamin (VITAMIN B-12) 1000 MCG tablet, Take 1 tablet (1,000 mcg total) by mouth once daily., Disp: , Rfl:     hydroCHLOROthiazide (HYDRODIURIL) 25 MG tablet, Take 1 tablet (25 mg total) by mouth once daily., Disp: 30 tablet, Rfl: 3    Review of patient's allergies indicates:  No Known Allergies    ROS: Denies blurred vision, dizziness, headache, chest pain, shortness of breath or edema    PE:  GEN: Alert and oriented, no acute distress  HEART: Normal S1 and S2, RRR, no lower extremity edema  LUNGS: Respirations unlabored, bilaterally clear to auscultation    ASSESSMENT/PLAN:  Latonya was seen today for hypertension.    Diagnoses and all orders for this visit:    Hypertension goal BP (blood pressure) < 140/90  -     Increase labetalol (NORMODYNE) 200 MG tablet; Take 1 tablet (200 mg total) by mouth 2 (two) times daily.    Memory problem  -     cyanocobalamin (VITAMIN B-12) 1000 MCG tablet; Take 1 tablet (1,000 mcg total) by mouth once daily.    Morbid obesity with BMI of 40.0-44.9, adult  -     Lifestyle modifications discussed    RTC in 4 weeks for HTN and memory problem.

## 2019-07-09 ENCOUNTER — PATIENT OUTREACH (OUTPATIENT)
Dept: ADMINISTRATIVE | Facility: HOSPITAL | Age: 49
End: 2019-07-09

## 2019-07-30 ENCOUNTER — OFFICE VISIT (OUTPATIENT)
Dept: INTERNAL MEDICINE | Facility: CLINIC | Age: 49
End: 2019-07-30
Payer: COMMERCIAL

## 2019-07-30 VITALS
TEMPERATURE: 97 F | SYSTOLIC BLOOD PRESSURE: 180 MMHG | DIASTOLIC BLOOD PRESSURE: 100 MMHG | WEIGHT: 237.44 LBS | HEIGHT: 62 IN | OXYGEN SATURATION: 100 % | BODY MASS INDEX: 43.69 KG/M2 | HEART RATE: 84 BPM

## 2019-07-30 DIAGNOSIS — R41.3 MEMORY PROBLEM: ICD-10-CM

## 2019-07-30 DIAGNOSIS — I10 HYPERTENSION GOAL BP (BLOOD PRESSURE) < 140/90: Primary | ICD-10-CM

## 2019-07-30 PROCEDURE — 99999 PR PBB SHADOW E&M-EST. PATIENT-LVL III: ICD-10-PCS | Mod: PBBFAC,,, | Performed by: INTERNAL MEDICINE

## 2019-07-30 PROCEDURE — 3080F PR MOST RECENT DIASTOLIC BLOOD PRESSURE >= 90 MM HG: ICD-10-PCS | Mod: CPTII,S$GLB,, | Performed by: INTERNAL MEDICINE

## 2019-07-30 PROCEDURE — 3077F PR MOST RECENT SYSTOLIC BLOOD PRESSURE >= 140 MM HG: ICD-10-PCS | Mod: CPTII,S$GLB,, | Performed by: INTERNAL MEDICINE

## 2019-07-30 PROCEDURE — 99214 PR OFFICE/OUTPT VISIT, EST, LEVL IV, 30-39 MIN: ICD-10-PCS | Mod: S$GLB,,, | Performed by: INTERNAL MEDICINE

## 2019-07-30 PROCEDURE — 3008F BODY MASS INDEX DOCD: CPT | Mod: CPTII,S$GLB,, | Performed by: INTERNAL MEDICINE

## 2019-07-30 PROCEDURE — 99999 PR PBB SHADOW E&M-EST. PATIENT-LVL III: CPT | Mod: PBBFAC,,, | Performed by: INTERNAL MEDICINE

## 2019-07-30 PROCEDURE — 3008F PR BODY MASS INDEX (BMI) DOCUMENTED: ICD-10-PCS | Mod: CPTII,S$GLB,, | Performed by: INTERNAL MEDICINE

## 2019-07-30 PROCEDURE — 3080F DIAST BP >= 90 MM HG: CPT | Mod: CPTII,S$GLB,, | Performed by: INTERNAL MEDICINE

## 2019-07-30 PROCEDURE — 99214 OFFICE O/P EST MOD 30 MIN: CPT | Mod: S$GLB,,, | Performed by: INTERNAL MEDICINE

## 2019-07-30 PROCEDURE — 3077F SYST BP >= 140 MM HG: CPT | Mod: CPTII,S$GLB,, | Performed by: INTERNAL MEDICINE

## 2019-07-30 RX ORDER — HYDROCHLOROTHIAZIDE 25 MG/1
25 TABLET ORAL DAILY
Qty: 30 TABLET | Refills: 3 | Status: SHIPPED | OUTPATIENT
Start: 2019-07-30 | End: 2022-07-28

## 2019-07-30 RX ORDER — LABETALOL 200 MG/1
200 TABLET, FILM COATED ORAL 2 TIMES DAILY
Qty: 60 TABLET | Refills: 3 | Status: SHIPPED | OUTPATIENT
Start: 2019-07-30 | End: 2020-08-25

## 2019-07-30 NOTE — PROGRESS NOTES
Subjective:       Patient ID: Latonya Blevins is a 48 y.o. female.    Chief Complaint: Hypertension    Hypertension   This is a recurrent problem. The current episode started more than 1 year ago. The problem has been gradually worsening since onset. The problem is resistant. Associated symptoms include anxiety, blurred vision, headaches, malaise/fatigue, orthopnea, palpitations and shortness of breath. Pertinent negatives include no neck pain or PND. Agents associated with hypertension include decongestants and NSAIDs. Risk factors for coronary artery disease include obesity, smoking/tobacco exposure and stress. Past treatments include nothing. The current treatment provides no improvement. There are no compliance problems.  There is no history of angina or CAD/MI. There is no history of chronic renal disease.   She has been out of labetalol for two days. She has not been taking HCTZ.   She needs refills.     She states her memory has improved since starting OTC vitamin B 12.     Review of Systems   Constitutional: Positive for malaise/fatigue.   Eyes: Positive for blurred vision.   Respiratory: Positive for shortness of breath.    Cardiovascular: Positive for palpitations and orthopnea. Negative for PND.   Musculoskeletal: Positive for arthralgias. Negative for neck pain.   Neurological: Positive for headaches.       Objective:      Physical Exam   Constitutional: She is oriented to person, place, and time. She appears well-developed and well-nourished. No distress.   Eyes: No scleral icterus.   Neck: No tracheal deviation present.   Cardiovascular: Normal rate and regular rhythm.   Pulmonary/Chest: Effort normal. No respiratory distress.   Neurological: She is alert and oriented to person, place, and time.   Skin: Skin is warm and dry.   Psychiatric: She has a normal mood and affect.   Vitals reviewed.      Assessment:       1. Hypertension goal BP (blood pressure) < 140/90    2. Memory problem        Plan:        Latonya was seen today for hypertension.    Diagnoses and all orders for this visit:    Hypertension goal BP (blood pressure) < 140/90  -     Counseled regarding medication compliance  -     Refill hydroCHLOROthiazide (HYDRODIURIL) 25 MG tablet; Take 1 tablet (25 mg total) by mouth once daily.  -     Refill labetalol (NORMODYNE) 200 MG tablet; Take 1 tablet (200 mg total) by mouth 2 (two) times daily.  -     Lifestyle modifications discussed    Memory problem  -     Continue vitamin B 12    RTC in 2 weeks.

## 2019-07-30 NOTE — PATIENT INSTRUCTIONS
Uncontrolled High Blood Pressure (Established)    Your blood pressure was unusually high today. This can occur if youve missed doses of your blood pressure medicine. Or it can happen if you are taking other medicines. These include some asthma inhalers, decongestants, diet pills, and street drugs like cocaine and amphetamine.  Other causes include:  · Weight gain  · More salt in your diet  · Smoking  · Caffeine  Your blood pressure can also rise if you are emotionally upset or in intense pain. It may go back to normal after a period of rest.  A blood pressure reading is made up of 2 numbers. There is a top number over a bottom number. The top number is the systolic pressure. The bottom number is the diastolic pressure. A normal blood pressure is a systolic pressure of less than 120 over a diastolic pressure of less than 80. High blood pressure (hypertension) is when the top number is 140 or higher. Or it is when the bottom number is 90 or higher. You will see your blood pressure readings written together. For example, a person with a systolic pressure of 118 and a diastolic pressure of 78 will have 118/78 written in the medical record. To be high blood pressure, the numbers must be higher when tested over a period of time. The blood pressures between normal and hypertension are called prehypertension. Prehypertension is a warning sign. The information gives you a chance to make lifestyle changes (weight loss, more exercise) that can keep your blood pressure from going higher.  Home care  Its important to take steps to lower your blood pressure. If you are taking blood pressure medicine, the guidelines below may help you need less or no medicines in the future.  · Begin a weight-loss program if you are overweight.  · Cut back on the amount of salt in your diet:  ¨ Avoid high-salt foods like olives, pickles, smoked meats, and salted potato chips.  ¨ Dont add salt to your food at the table.  ¨ Use only small  amounts of salt when cooking.  · Begin an exercise program. Talk with your health care provider about what exercise program is best for you. It doesnt have to be difficult. Even brisk walking for 20 minutes 3 times a week is a good form of exercise.  · Avoid medicines that stimulates the heart. This includes many over-the-counter cold and sinus decongestant pills and sprays, as well as diet pills. Check the warnings about hypertension on the label. Before purchasing any over-the-counter medicines or supplements, always ask the pharmacist about the product's potential interaction with your high blood pressure and your medicines.  · Stimulants such as amphetamine or cocaine could be lethal for someone with hypertension. Never take these.  · Limit how much caffeine you drink. Or switch to noncaffeinated beverages.  · Stop smoking. If you are a long-time smoker, this can be hard. Enroll in a stop-smoking program to make it more likely that you will succeed. Talk with your provider about ways to quit.  · Learn how to handle stress better. This is an important part of any program to lower blood pressure. Learn ways to relax. These include meditation, yoga, and biofeedback.  · If medicines were prescribed, take them exactly as directed. Missing doses may cause your blood pressure to get out of control.  · If you miss a dose or doses of your medicines, check with your healthcare provider or pharmacist about what to do.  · Consider buying an automatic blood pressure machine. Your provider may recommend a certain type. You can get one of these at most pharmacies. Measure your blood pressure twice a day, in the morning, and in the late afternoon. Keep a written record of your home blood pressure readings and take the record to your medical appointments.  Here are some additional guidelines on home blood pressure monitoring from the American [employer] Association.  · Don't smoke or drink coffee for 30 minutes  · Go to the bathroom  before the test.  · Relax for 5 minutes before taking the measurement.  · Sit correctly. Be sure your back is supported. Don't sit on a couch or soft chair. Uncross your feet and place them flat on the floor. Place your arm on a solid, flat surface like a table with the upper arm at heart level. Make certain the middle of the cuff is directly above the eye of the elbow. Check the monitor's instruction manual for an illustration.  · Take multiple readings. When you measure, take 2 or 3 readings one minute apart and record all of the results.  · Take your blood pressure at the same time every day, or as your healthcare provider recommends.  · Record the date, time, and blood pressure reading.  · Take the record with you to your next appointment. If your blood pressure monitor has a built-in memory, simply take the monitor with you to your next appointment.  · Call your provider if you have several high readings. Don't be frightened by a single high reading, but if you get several high readings, check in with your healthcare provider.  · Note: When blood pressure reaches a systolic (top number) of 180 or higher or a diastolic (bottom number) of 110 or higher, emergency medical treatment is required. Call your healthcare provider immediately.  Follow-up care  Regular visits to your own healthcare provider for blood pressure and medicine checks are an important part of your care. Make a follow-up appointment as directed. Bring the record of your home blood pressure readings to the appointment.  When to seek medical advice  Call your healthcare provider right away if any of these occur:  · Blood pressure reaches a systolic (top number) of 180 or higher or diastolic (bottom number) of 110 or higher, emergency medical treatment is required.  · Chest, arm, shoulder, neck, or upper back pain  · Shortness of breath  · Severe headache  · Throbbing or rushing sound in the ears  · Nosebleed  · Extreme drowsiness, confusion, or  fainting  · Dizziness or dizziness with spinning sensation (vertigo)  · Weakness in an arm or leg or on one side of the face  · Trouble speaking or seeing   Date Last Reviewed: 1/1/2017  © 3152-4818 [employer]. [address] [place] [address], [place] [address]. All rights reserved. This information is not intended as a substitute for professional medical care. Always follow your healthcare professional's instructions.

## 2019-10-27 ENCOUNTER — HOSPITAL ENCOUNTER (EMERGENCY)
Facility: HOSPITAL | Age: 49
Discharge: HOME | End: 2019-10-27
Attending: EMERGENCY MEDICINE
Payer: COMMERCIAL

## 2019-10-27 VITALS
DIASTOLIC BLOOD PRESSURE: 99 MMHG | BODY MASS INDEX: 43.57 KG/M2 | HEIGHT: 62 IN | TEMPERATURE: 98 F | SYSTOLIC BLOOD PRESSURE: 186 MMHG | RESPIRATION RATE: 18 BRPM | OXYGEN SATURATION: 100 % | WEIGHT: 236.75 LBS | HEART RATE: 67 BPM

## 2019-10-27 DIAGNOSIS — M43.6 TORTICOLLIS, ACUTE: Primary | ICD-10-CM

## 2019-10-27 DIAGNOSIS — R07.9 CHEST PAIN: ICD-10-CM

## 2019-10-27 LAB
ALBUMIN SERPL BCP-MCNC: 3.4 G/DL (ref 3.5–5.2)
ALP SERPL-CCNC: 73 U/L (ref 55–135)
ALT SERPL W/O P-5'-P-CCNC: 8 U/L (ref 10–44)
ANION GAP SERPL CALC-SCNC: 10 MMOL/L (ref 8–16)
AST SERPL-CCNC: 15 U/L (ref 10–40)
BASOPHILS # BLD AUTO: 0.05 K/UL (ref 0–0.2)
BASOPHILS NFR BLD: 0.6 % (ref 0–1.9)
BILIRUB SERPL-MCNC: 0.4 MG/DL (ref 0.1–1)
BNP SERPL-MCNC: 139 PG/ML (ref 0–99)
BUN SERPL-MCNC: 14 MG/DL (ref 6–20)
CALCIUM SERPL-MCNC: 9.1 MG/DL (ref 8.7–10.5)
CHLORIDE SERPL-SCNC: 105 MMOL/L (ref 95–110)
CO2 SERPL-SCNC: 23 MMOL/L (ref 23–29)
CREAT SERPL-MCNC: 0.8 MG/DL (ref 0.5–1.4)
DIFFERENTIAL METHOD: ABNORMAL
EOSINOPHIL # BLD AUTO: 0.2 K/UL (ref 0–0.5)
EOSINOPHIL NFR BLD: 2.6 % (ref 0–8)
ERYTHROCYTE [DISTWIDTH] IN BLOOD BY AUTOMATED COUNT: 16.8 % (ref 11.5–14.5)
EST. GFR  (AFRICAN AMERICAN): >60 ML/MIN/1.73 M^2
EST. GFR  (NON AFRICAN AMERICAN): >60 ML/MIN/1.73 M^2
GLUCOSE SERPL-MCNC: 104 MG/DL (ref 70–110)
HCT VFR BLD AUTO: 28.9 % (ref 37–48.5)
HGB BLD-MCNC: 9.3 G/DL (ref 12–16)
HIV 1+2 AB+HIV1 P24 AG SERPL QL IA: NEGATIVE
IMM GRANULOCYTES # BLD AUTO: 0.02 K/UL (ref 0–0.04)
IMM GRANULOCYTES NFR BLD AUTO: 0.2 % (ref 0–0.5)
LYMPHOCYTES # BLD AUTO: 1.7 K/UL (ref 1–4.8)
LYMPHOCYTES NFR BLD: 20.9 % (ref 18–48)
MCH RBC QN AUTO: 26.3 PG (ref 27–31)
MCHC RBC AUTO-ENTMCNC: 32.2 G/DL (ref 32–36)
MCV RBC AUTO: 82 FL (ref 82–98)
MONOCYTES # BLD AUTO: 0.7 K/UL (ref 0.3–1)
MONOCYTES NFR BLD: 8 % (ref 4–15)
NEUTROPHILS # BLD AUTO: 5.5 K/UL (ref 1.8–7.7)
NEUTROPHILS NFR BLD: 67.7 % (ref 38–73)
NRBC BLD-RTO: 0 /100 WBC
PLATELET # BLD AUTO: 256 K/UL (ref 150–350)
PMV BLD AUTO: 12 FL (ref 9.2–12.9)
POTASSIUM SERPL-SCNC: 3.5 MMOL/L (ref 3.5–5.1)
PROT SERPL-MCNC: 6.4 G/DL (ref 6–8.4)
RBC # BLD AUTO: 3.54 M/UL (ref 4–5.4)
SODIUM SERPL-SCNC: 138 MMOL/L (ref 136–145)
TROPONIN I SERPL DL<=0.01 NG/ML-MCNC: 0.01 NG/ML (ref 0–0.03)
WBC # BLD AUTO: 8.13 K/UL (ref 3.9–12.7)

## 2019-10-27 PROCEDURE — 93010 ELECTROCARDIOGRAM REPORT: CPT | Mod: ,,, | Performed by: INTERNAL MEDICINE

## 2019-10-27 PROCEDURE — 25000003 PHARM REV CODE 250: Performed by: EMERGENCY MEDICINE

## 2019-10-27 PROCEDURE — 96375 TX/PRO/DX INJ NEW DRUG ADDON: CPT | Mod: 59

## 2019-10-27 PROCEDURE — 25000003 PHARM REV CODE 250: Performed by: PHYSICIAN ASSISTANT

## 2019-10-27 PROCEDURE — 86703 HIV-1/HIV-2 1 RESULT ANTBDY: CPT

## 2019-10-27 PROCEDURE — 93010 EKG 12-LEAD: ICD-10-PCS | Mod: ,,, | Performed by: INTERNAL MEDICINE

## 2019-10-27 PROCEDURE — 83880 ASSAY OF NATRIURETIC PEPTIDE: CPT

## 2019-10-27 PROCEDURE — 96374 THER/PROPH/DIAG INJ IV PUSH: CPT

## 2019-10-27 PROCEDURE — 84484 ASSAY OF TROPONIN QUANT: CPT

## 2019-10-27 PROCEDURE — 85025 COMPLETE CBC W/AUTO DIFF WBC: CPT

## 2019-10-27 PROCEDURE — 99285 EMERGENCY DEPT VISIT HI MDM: CPT | Mod: 25

## 2019-10-27 PROCEDURE — 80053 COMPREHEN METABOLIC PANEL: CPT

## 2019-10-27 PROCEDURE — 63600175 PHARM REV CODE 636 W HCPCS: Performed by: EMERGENCY MEDICINE

## 2019-10-27 PROCEDURE — 93005 ELECTROCARDIOGRAM TRACING: CPT

## 2019-10-27 RX ORDER — HYDROCODONE BITARTRATE AND ACETAMINOPHEN 10; 325 MG/1; MG/1
1 TABLET ORAL EVERY 6 HOURS PRN
Qty: 9 TABLET | Refills: 0 | Status: SHIPPED | OUTPATIENT
Start: 2019-10-27 | End: 2020-08-25

## 2019-10-27 RX ORDER — CYCLOBENZAPRINE HCL 10 MG
10 TABLET ORAL
Status: COMPLETED | OUTPATIENT
Start: 2019-10-27 | End: 2019-10-27

## 2019-10-27 RX ORDER — HYDROMORPHONE HYDROCHLORIDE 2 MG/ML
1 INJECTION, SOLUTION INTRAMUSCULAR; INTRAVENOUS; SUBCUTANEOUS
Status: COMPLETED | OUTPATIENT
Start: 2019-10-27 | End: 2019-10-27

## 2019-10-27 RX ORDER — ASPIRIN 325 MG
325 TABLET ORAL
Status: COMPLETED | OUTPATIENT
Start: 2019-10-27 | End: 2019-10-27

## 2019-10-27 RX ORDER — CYCLOBENZAPRINE HCL 10 MG
5 TABLET ORAL 3 TIMES DAILY PRN
Qty: 15 TABLET | Refills: 0 | Status: SHIPPED | OUTPATIENT
Start: 2019-10-27 | End: 2022-07-28

## 2019-10-27 RX ORDER — KETOROLAC TROMETHAMINE 30 MG/ML
30 INJECTION, SOLUTION INTRAMUSCULAR; INTRAVENOUS
Status: COMPLETED | OUTPATIENT
Start: 2019-10-27 | End: 2019-10-27

## 2019-10-27 RX ADMIN — ASPIRIN 325 MG ORAL TABLET 325 MG: 325 PILL ORAL at 10:10

## 2019-10-27 RX ADMIN — KETOROLAC TROMETHAMINE 30 MG: 30 INJECTION, SOLUTION INTRAMUSCULAR at 10:10

## 2019-10-27 RX ADMIN — CYCLOBENZAPRINE HYDROCHLORIDE 10 MG: 10 TABLET, FILM COATED ORAL at 10:10

## 2019-10-27 RX ADMIN — HYDROMORPHONE HYDROCHLORIDE 1 MG: 2 INJECTION INTRAMUSCULAR; INTRAVENOUS; SUBCUTANEOUS at 12:10

## 2019-10-27 NOTE — DISCHARGE INSTRUCTIONS
Ibuprofen 400 mg orally 3 times daily on a schedule for 1 week.  Use Flexeril as a muscle relaxer.  Norco for breakthrough pain.  Do not drive or operate machinery work at heights while taking Norco Flexeril within 12 hr as this will put you and others at risk.  Follow-up with her doctor tomorrow for re-evaluation.  Return as needed for any worsening symptoms, problems, questions or concerns.

## 2019-10-27 NOTE — ED PROVIDER NOTES
"SCRIBE #1 NOTE: I, Demi Mckinney, am scribing for, and in the presence of, Marshall Atkinson Jr., MD. I have scribed the entire note.       History     Chief Complaint   Patient presents with    Chest Pain     "pain to back around to heart" x 1 week; denies SOB, n/v; feels like stabbing, tightness, like a "crook or charley horse"     Review of patient's allergies indicates:  No Known Allergies      History of Present Illness     HPI    10/27/2019, 10:28 AM  History obtained from the patient      History of Present Illness: Latonya Blevins is a 49 y.o. female patient with a PMHx of HTN who presents to the Emergency Department for evaluation of L neck pain extending to her L shoulder which onset gradually 1 week ago. The pain has been constant and moderate in severity. She reports that it hurts to breathe and has pain with head movement. Prior tx include Aleve for pain. She reports no mitigating or exacerbating factors. Patient denies any fever, chills, diaphoresis, SOB, cough, leg pain/swelling, N/V, dizziness, extremity weakness/numbness, shoulder/neck trauma, and all other sxs at this time.      Arrival mode: Personal vehicle    PCP: Anjelica Laguerre DO        Past Medical History:  Past Medical History:   Diagnosis Date    Hypertension        Past Surgical History:  Past Surgical History:   Procedure Laterality Date     SECTION      DILATION AND CURETTAGE OF UTERUS           Family History:  Family History   Problem Relation Age of Onset    Brain cancer Mother     Breast cancer Mother     Lung cancer Father     Brain cancer Father        Social History:  Social History     Tobacco Use    Smoking status: Current Every Day Smoker     Packs/day: 0.50     Types: Cigarettes     Start date:     Smokeless tobacco: Never Used   Substance and Sexual Activity    Alcohol use: No     Frequency: Monthly or less     Drinks per session: 1 or 2     Binge frequency: Never    Drug use: No    Sexual " activity: Yes     Partners: Male        Review of Systems     Review of Systems   Constitutional: Negative for chills, diaphoresis and fever.   HENT: Negative for sore throat.    Respiratory: Negative for cough and shortness of breath.    Cardiovascular: Negative for chest pain, palpitations and leg swelling.   Gastrointestinal: Negative for nausea and vomiting.   Genitourinary: Negative for dysuria.   Musculoskeletal: Positive for neck pain. Negative for arthralgias, back pain and myalgias.   Skin: Negative for rash.   Neurological: Negative for dizziness, weakness, light-headedness, numbness and headaches.   Hematological: Does not bruise/bleed easily.   All other systems reviewed and are negative.     Physical Exam     Initial Vitals [10/27/19 1018]   BP Pulse Resp Temp SpO2   (!) 189/100 66 16 98.4 °F (36.9 °C) 100 %      MAP       --          Physical Exam  Nursing Notes and Vital Signs Reviewed.  Constitutional: Patient is in no acute distress. Well-developed and well-nourished.  Head: Atraumatic. Normocephalic.  Eyes: PERRL. EOM intact. Conjunctivae are not pale. No scleral icterus.    ENT: Mucous membranes are moist. Oropharynx is clear and symmetric.  Nares clear.  There is no mastoid tenderness.  There is no erythema over the mastoid.  There is no nuchal rigidity or meningismus. There is spasm noted to the left trapezius.  Neck: Supple. Full ROM.  Cardiovascular: Regular rate. Regular rhythm. No murmurs, rubs, or gallops. Distal pulses are 2+ and symmetric.  Pulmonary/Chest: No respiratory distress. Clear to auscultation bilaterally. No wheezing or rales.  Abdominal: Soft and non-distended.  There is no tenderness.  No rebound, guarding, or rigidity.   Musculoskeletal: Moves all extremities. No obvious deformities. No edema. No calf tenderness. There is no point C/T/L/S tenderness. Normal spinal curvature.  Her spasm left trapezius.  There is mild pain to the left trapezius 1 and 2 with axial movement of  "the head to the left.  Skin: Warm and dry. No rash. No cellulitis.  No erythema over mastoid.  Neurological:  Alert, awake, and appropriate.  Normal speech.  No acute focal neurological deficits are appreciated. Left median radial ulnar musculocutaneous axillary nerves are intact on exam.  Psychiatric: Normal affect. Good eye contact. Appropriate in content.     ED Course   Procedures  ED Vital Signs:  Vitals:    10/27/19 1018 10/27/19 1031 10/27/19 1032 10/27/19 1045   BP: (!) 189/100      Pulse: 66 65 64    Resp: 16  19    Temp: 98.4 °F (36.9 °C)      TempSrc: Oral      SpO2: 100%  100%    Weight: 107.4 kg (236 lb 12.4 oz)      Height:    5' 2" (1.575 m)    10/27/19 1056 10/27/19 1118 10/27/19 1119 10/27/19 1125   BP: (!) 188/95 (!) 165/89  (!) 166/101   Pulse: 74 66  71   Resp: 13  20 16   Temp:       TempSrc:       SpO2: 100% 100%  100%   Weight:       Height:        10/27/19 1209   BP:    Pulse: 63   Resp: 17   Temp:    TempSrc:    SpO2: 100%   Weight:    Height:        Abnormal Lab Results:  Labs Reviewed   CBC W/ AUTO DIFFERENTIAL - Abnormal; Notable for the following components:       Result Value    RBC 3.54 (*)     Hemoglobin 9.3 (*)     Hematocrit 28.9 (*)     Mean Corpuscular Hemoglobin 26.3 (*)     RDW 16.8 (*)     All other components within normal limits   COMPREHENSIVE METABOLIC PANEL - Abnormal; Notable for the following components:    Albumin 3.4 (*)     ALT 8 (*)     All other components within normal limits   B-TYPE NATRIURETIC PEPTIDE - Abnormal; Notable for the following components:     (*)     All other components within normal limits   TROPONIN I   HIV 1 / 2 ANTIBODY        All Lab Results:  Results for orders placed or performed during the hospital encounter of 10/27/19   CBC auto differential   Result Value Ref Range    WBC 8.13 3.90 - 12.70 K/uL    RBC 3.54 (L) 4.00 - 5.40 M/uL    Hemoglobin 9.3 (L) 12.0 - 16.0 g/dL    Hematocrit 28.9 (L) 37.0 - 48.5 %    Mean Corpuscular Volume 82 " 82 - 98 fL    Mean Corpuscular Hemoglobin 26.3 (L) 27.0 - 31.0 pg    Mean Corpuscular Hemoglobin Conc 32.2 32.0 - 36.0 g/dL    RDW 16.8 (H) 11.5 - 14.5 %    Platelets 256 150 - 350 K/uL    MPV 12.0 9.2 - 12.9 fL    Immature Granulocytes 0.2 0.0 - 0.5 %    Gran # (ANC) 5.5 1.8 - 7.7 K/uL    Immature Grans (Abs) 0.02 0.00 - 0.04 K/uL    Lymph # 1.7 1.0 - 4.8 K/uL    Mono # 0.7 0.3 - 1.0 K/uL    Eos # 0.2 0.0 - 0.5 K/uL    Baso # 0.05 0.00 - 0.20 K/uL    nRBC 0 0 /100 WBC    Gran% 67.7 38.0 - 73.0 %    Lymph% 20.9 18.0 - 48.0 %    Mono% 8.0 4.0 - 15.0 %    Eosinophil% 2.6 0.0 - 8.0 %    Basophil% 0.6 0.0 - 1.9 %    Differential Method Automated    Comprehensive metabolic panel   Result Value Ref Range    Sodium 138 136 - 145 mmol/L    Potassium 3.5 3.5 - 5.1 mmol/L    Chloride 105 95 - 110 mmol/L    CO2 23 23 - 29 mmol/L    Glucose 104 70 - 110 mg/dL    BUN, Bld 14 6 - 20 mg/dL    Creatinine 0.8 0.5 - 1.4 mg/dL    Calcium 9.1 8.7 - 10.5 mg/dL    Total Protein 6.4 6.0 - 8.4 g/dL    Albumin 3.4 (L) 3.5 - 5.2 g/dL    Total Bilirubin 0.4 0.1 - 1.0 mg/dL    Alkaline Phosphatase 73 55 - 135 U/L    AST 15 10 - 40 U/L    ALT 8 (L) 10 - 44 U/L    Anion Gap 10 8 - 16 mmol/L    eGFR if African American >60 >60 mL/min/1.73 m^2    eGFR if non African American >60 >60 mL/min/1.73 m^2   Troponin I #1   Result Value Ref Range    Troponin I 0.008 0.000 - 0.026 ng/mL   B-Type natriuretic peptide (BNP)   Result Value Ref Range     (H) 0 - 99 pg/mL   HIV 1/2 Ag/Ab (4th Gen)   Result Value Ref Range    HIV 1/2 Ag/Ab Negative Negative     Imaging Results:  Imaging Results          X-Ray Chest PA And Lateral (Final result)  Result time 10/27/19 11:22:20    Final result by Mundo Platt MD (10/27/19 11:22:20)                 Impression:      No acute cardiopulmonary disease.      Electronically signed by: Mundo Platt MD  Date:    10/27/2019  Time:    11:22             Narrative:    EXAMINATION:  XR CHEST PA AND LATERAL    CLINICAL  HISTORY:  Chest Pain;    TECHNIQUE:  PA and lateral views of the chest were performed.    COMPARISON:  None    FINDINGS:  The lungs are clear, with normal appearance of pulmonary vasculature.  No pleural effusion or pneumothorax.    The cardiac silhouette is normal in size. The hilar and mediastinal contours are unremarkable.                                 The EKG was ordered, reviewed, and independently interpreted by the ED provider.  Interpretation time: 10:31  Rate: 65 BPM  Rhythm: normal sinus rhythm  Interpretation: No acute ST changes. No STEMI.         The Emergency Provider reviewed the vital signs and test results, which are outlined above.     ED Discussion     11:54 AM: Re-evaluated pt. Pt states that she is still in pain.    12:13 PM: Reassessed pt at this time. Discussed with pt all pertinent ED information and results. Discussed pt dx and plan of tx. Gave pt all f/u and return to the ED instructions. All questions and concerns were addressed at this time. Pt expresses understanding of information and instructions, and is comfortable with plan to discharge. Pt is stable for discharge.    I discussed with patient and/or family/caretaker that evaluation in the ED does not suggest any emergent or life threatening medical conditions requiring immediate intervention beyond what was provided in the ED, and I believe patient is safe for discharge.  Regardless, an unremarkable evaluation in the ED does not preclude the development or presence of a serious of life threatening condition. As such, patient was instructed to return immediately for any worsening or change in current symptoms.     12:16 PM  Patient is stable nontoxic.  Clinically she has torticollis.  I will treat this symptomatically as well as with anti-inflammatories.  She has follow-up with primary care doctor.  I discussed with her all findings well as the plan of care.  I have cautioned her about operate machinery or driving while taking any  muscle relaxers and pain medications.  She verbalizes her understanding and agreement with all.  Her  is here with her as well and he verbalizes his understanding as well. She is safe for discharge in my opinion.    Medical Decision Making:   Clinical Tests:   Lab Tests: Ordered and Reviewed  Radiological Study: Ordered and Reviewed  Medical Tests: Reviewed and Ordered           ED Medication(s):  Medications   aspirin tablet 325 mg (325 mg Oral Given 10/27/19 1036)   ketorolac injection 30 mg (30 mg Intravenous Given by Other 10/27/19 1058)   cyclobenzaprine tablet 10 mg (10 mg Oral Given 10/27/19 1058)   hydromorphone (PF) injection 1 mg (1 mg Intravenous Given 10/27/19 1206)       New Prescriptions    CYCLOBENZAPRINE (FLEXERIL) 10 MG TABLET    Take 0.5 tablets (5 mg total) by mouth 3 (three) times daily as needed for Muscle spasms.    HYDROCODONE-ACETAMINOPHEN (NORCO)  MG PER TABLET    Take 1 tablet by mouth every 6 (six) hours as needed for Pain.               Scribe Attestation:   Scribe #1: I performed the above scribed service and the documentation accurately describes the services I performed. I attest to the accuracy of the note.     Attending:   Physician Attestation Statement for Scribe #1: I, , personally performed the services described in this documentation, as scribed by Demi Mckinney, in my presence, and it is both accurate and complete.           Clinical Impression       ICD-10-CM ICD-9-CM   1. Torticollis, acute M43.6 723.5   2. Chest pain R07.9 786.50       Disposition:   Disposition: Discharged  Condition: Stable         Marshall Atkinson Jr., MD  10/27/19 2872

## 2020-01-16 ENCOUNTER — OFFICE VISIT (OUTPATIENT)
Dept: INTERNAL MEDICINE | Facility: CLINIC | Age: 50
End: 2020-01-16
Payer: COMMERCIAL

## 2020-01-16 VITALS
WEIGHT: 232.38 LBS | DIASTOLIC BLOOD PRESSURE: 102 MMHG | HEIGHT: 62 IN | SYSTOLIC BLOOD PRESSURE: 182 MMHG | TEMPERATURE: 97 F | BODY MASS INDEX: 42.76 KG/M2 | HEART RATE: 70 BPM | OXYGEN SATURATION: 99 %

## 2020-01-16 DIAGNOSIS — R41.3 MEMORY LOSS: ICD-10-CM

## 2020-01-16 DIAGNOSIS — I10 HYPERTENSION GOAL BP (BLOOD PRESSURE) < 140/90: Primary | ICD-10-CM

## 2020-01-16 PROCEDURE — 99214 OFFICE O/P EST MOD 30 MIN: CPT | Mod: S$GLB,,, | Performed by: INTERNAL MEDICINE

## 2020-01-16 PROCEDURE — 3008F BODY MASS INDEX DOCD: CPT | Mod: CPTII,S$GLB,, | Performed by: INTERNAL MEDICINE

## 2020-01-16 PROCEDURE — 99999 PR PBB SHADOW E&M-EST. PATIENT-LVL IV: ICD-10-PCS | Mod: PBBFAC,,, | Performed by: INTERNAL MEDICINE

## 2020-01-16 PROCEDURE — 3077F SYST BP >= 140 MM HG: CPT | Mod: CPTII,S$GLB,, | Performed by: INTERNAL MEDICINE

## 2020-01-16 PROCEDURE — 99214 PR OFFICE/OUTPT VISIT, EST, LEVL IV, 30-39 MIN: ICD-10-PCS | Mod: S$GLB,,, | Performed by: INTERNAL MEDICINE

## 2020-01-16 PROCEDURE — 3077F PR MOST RECENT SYSTOLIC BLOOD PRESSURE >= 140 MM HG: ICD-10-PCS | Mod: CPTII,S$GLB,, | Performed by: INTERNAL MEDICINE

## 2020-01-16 PROCEDURE — 3080F DIAST BP >= 90 MM HG: CPT | Mod: CPTII,S$GLB,, | Performed by: INTERNAL MEDICINE

## 2020-01-16 PROCEDURE — 3008F PR BODY MASS INDEX (BMI) DOCUMENTED: ICD-10-PCS | Mod: CPTII,S$GLB,, | Performed by: INTERNAL MEDICINE

## 2020-01-16 PROCEDURE — 3080F PR MOST RECENT DIASTOLIC BLOOD PRESSURE >= 90 MM HG: ICD-10-PCS | Mod: CPTII,S$GLB,, | Performed by: INTERNAL MEDICINE

## 2020-01-16 PROCEDURE — 99999 PR PBB SHADOW E&M-EST. PATIENT-LVL IV: CPT | Mod: PBBFAC,,, | Performed by: INTERNAL MEDICINE

## 2020-01-16 RX ORDER — OLMESARTAN MEDOXOMIL 20 MG/1
20 TABLET ORAL DAILY
Qty: 30 TABLET | Refills: 0 | Status: SHIPPED | OUTPATIENT
Start: 2020-01-16 | End: 2020-08-25

## 2020-01-16 NOTE — PROGRESS NOTES
Subjective:       Patient ID: Latonya Blevins is a 49 y.o. female.    Chief Complaint: Follow-up and Hypertension    Latonya Blevins  49 y.o. Black or  female    Patient presents with:  Follow-up  Hypertension    HPI: Here today to follow up on hypertension. Her b/p is significantly elevated. She has been having headaches. She has not been taking HCTZ or labetalol because she states it does not work. She states the HCTZ only keeps her up at night urinating. She has also been taking decongestants for the past month.                   LDLCALC                  93.0                03/19/2019            She continues to have memory loss and states it is getting worse. She would like to see a neurologist.                      TSH                      1.739               03/19/2019                              [id number]               235                 05/13/2019              Past Medical History:  Hypertension    Current Outpatient Medications on File Prior to Visit:  cyanocobalamin (VITAMIN B-12) 1000 MCG tablet, Take 1 tablet (1,000 mcg total) by mouth once daily., Disp: , Rfl:   cyclobenzaprine (FLEXERIL) 10 MG tablet, Take 0.5 tablets (5 mg total) by mouth 3 (three) times daily as needed for Muscle spasms. (Patient not taking: Reported on 1/16/2020), Disp: 15 tablet, Rfl: 0  hydroCHLOROthiazide (HYDRODIURIL) 25 MG tablet, Take 1 tablet (25 mg total) by mouth once daily. (Patient not taking: Reported on 1/16/2020), Disp: 30 tablet, Rfl: 3  HYDROcodone-acetaminophen (NORCO)  mg per tablet, Take 1 tablet by mouth every 6 (six) hours as needed for Pain. (Patient not taking: Reported on 1/16/2020), Disp: 9 tablet, Rfl: 0  labetalol (NORMODYNE) 200 MG tablet, Take 1 tablet (200 mg total) by mouth 2 (two) times daily. (Patient not taking: Reported on 1/16/2020), Disp: 60 tablet, Rfl: 3    Allergies:  Review of patient's allergies indicates:  No Known Allergies          Review of Systems    Constitutional: Negative for fever.   HENT: Positive for congestion and sinus pressure.    Respiratory: Negative for shortness of breath.    Cardiovascular: Negative for chest pain and leg swelling.   Genitourinary: Positive for frequency.   Musculoskeletal: Negative for gait problem.   Neurological: Positive for headaches. Negative for dizziness.       Objective:      Physical Exam   Constitutional: She is oriented to person, place, and time. She appears well-developed and well-nourished. No distress.   Eyes: No scleral icterus.   Cardiovascular: Normal rate, regular rhythm and normal heart sounds.   Pulmonary/Chest: Effort normal and breath sounds normal. No respiratory distress. She has no wheezes.   Musculoskeletal: She exhibits no edema.   Neurological: She is alert and oriented to person, place, and time.   Skin: Skin is warm and dry.   Psychiatric: She has a normal mood and affect.   Vitals reviewed.      Assessment:       1. Hypertension goal BP (blood pressure) < 140/90    2. Memory loss        Plan:       Latonya was seen today for follow-up and hypertension.    Diagnoses and all orders for this visit:    Hypertension goal BP (blood pressure) < 140/90  -     olmesartan (BENICAR) 20 MG tablet; Take 1 tablet (20 mg total) by mouth once daily. Discussed medication risks and benefits.   -     Lifestyle modifications discussed  -     Advised to avoid OTC decongestants     Memory loss  -     Ambulatory consult to Neurology    RTC in 2 weeks for HTN.

## 2020-01-16 NOTE — PATIENT INSTRUCTIONS
Olmesartan tablets  What is this medicine?  OLMESARTAN (all mi DENNIS tan) is used to treat high blood pressure.  How should I use this medicine?  Take this medicine by mouth with a glass of water. Follow the directions on the prescription label. This medicine can be taken with or without food. Take your doses at regular intervals. Do not take your medicine more often than directed. Do not stop taking except on the advice of your doctor or health care professional.  Talk to your pediatrician regarding the use of this medicine in children. While this drug may be prescribed for children as young as 6 years for selected conditions, precautions do apply.  What side effects may I notice from receiving this medicine?  Side effects that you should report to your doctor or health care professional as soon as possible:  · confusion, dizziness, light headedness or fainting spells  · decreased amount of urine passed  · diarrhea  · difficulty breathing or swallowing, hoarseness, or tightening of the throat  · fast or irregular heart beat, palpitations, or chest pain  · skin rash, itching  · swelling of your face, lips, tongue, hands, or feet  · vomiting  · weight loss  Side effects that usually do not require medical attention (report to your doctor or health care professional if they continue or are bothersome):  · cough  · decreased sexual function or desire  · headache  · nasal congestion or stuffiness  · nausea  · sore or cramping muscles  What may interact with this medicine?  · blood pressure medicines  · diuretics, especially triamterene, spironolactone or amiloride  · potassium salts or potassium supplements  What if I miss a dose?  If you miss a dose, take it as soon as you can. If it is almost time for your next dose, take only that dose. Do not take double or extra doses.  Where should I keep my medicine?  Keep out of the reach of children.  Store your medicine at room temperature between 20 and 25 degrees C (68 and  77 degrees F). Throw away any unused medicine after the expiration date.  What should I tell my health care provider before I take this medicine?  They need to know if you have any of these conditions:  · if you are on a special diet, such as a low-salt diet  · kidney or liver disease  · an unusual or allergic reaction to olmesartan, other medicines, foods, dyes, or preservatives  · pregnant or trying to get pregnant  · breast-feeding  What should I watch for while using this medicine?  Visit your doctor or health care professional for regular checks on your progress. Check your blood pressure as directed. Ask your doctor or health care professional what your blood pressure should be and when you should contact him or her. Call your doctor or health care professional if you notice an irregular or fast heart beat.  Women should inform their doctor if they wish to become pregnant or think they might be pregnant. There is a potential for serious side effects to an unborn child, particularly in the second or third trimester. Talk to your health care professional or pharmacist for more information.  You may get drowsy or dizzy. Do not drive, use machinery, or do anything that needs mental alertness until you know how this drug affects you. Do not stand or sit up quickly, especially if you are an older patient. This reduces the risk of dizzy or fainting spells. Alcohol can make you more drowsy and dizzy. Avoid alcoholic drinks.  Avoid salt substitutes unless you are told otherwise by your doctor or health care professional.  Do not treat yourself for coughs, colds, or pain while you are taking this medicine without asking your doctor or health care professional for advice. Some ingredients may increase your blood pressure.  NOTE:This sheet is a summary. It may not cover all possible information. If you have questions about this medicine, talk to your doctor, pharmacist, or health care provider. Copyright© 2017 [employer]        Low-Salt Diet  This diet removes foods that are high in salt. It also limits the amount of salt you use when cooking. It is most often used for people with high blood pressure, edema (fluid retention), and kidney, liver, or heart disease.  Table salt contains the mineral sodium. Your body needs sodium to work normally. But too much sodium can make your health problems worse. Your healthcare provider is recommending a low-salt (also called low-sodium) diet for you. Your total daily allowance of salt is 1,500 to 2,300 milligrams (mg). It is less than 1 teaspoon of table salt. This means you can have only about 500 to 700 mg of sodium at each meal. People with certain health problems should limit salt intake to the lower end of the recommended range.    When you cook, dont add much salt. If you can cook without using salt, even better. Dont add salt to your food at the table.  When shopping, read food labels. Salt is often called sodium on the label. Choose foods that are salt-free, low salt, or very low salt. Note that foods with reduced salt may not lower your salt intake enough.    Beans, potatoes, and pasta  Ok: Dry beans, split peas, lentils, potatoes, rice, macaroni, pasta, spaghetti without added salt  Avoid: Potato chips, tortilla chips, and similar products  Breads and cereals  Ok: Low-sodium breads, rolls, cereals, and cakes; low-salt crackers, matzo crackers  Avoid: Salted crackers, pretzels, popcorn, Sinhala toast, pancakes, muffins  Dairy  Ok: Milk, chocolate milk, hot chocolate mix, low-salt cheeses, and yogurt  Avoid: Processed cheese and cheese spreads; Roquefort, Camembert, and cottage cheese; buttermilk, instant breakfast drink  Desserts  Ok: Ice cream, frozen yogurt, juice bars, gelatin, cookies and pies, sugar, honey, jelly, hard candy  Avoid: Most pies, cakes and cookies prepared or processed with salt; instant pudding  Drinks  Ok: Tea, coffee, fizzy (carbonated) drinks,  juices  Avoid: Flavored coffees, electrolyte replacement drinks, sports drinks  Meats  Ok: All fresh meat, fish, poultry, low-salt tuna, eggs, egg substitute  Avoid: Smoked, pickled, brine-cured, or salted meats and fish. This includes lowe, chipped beef, corned beef, hot dogs, deli meats, ham, kosher meats, salt pork, sausage, canned tuna, salted codfish, smoked salmon, herring, sardines, or anchovies.  Seasonings and spices  Ok: Most seasonings are okay. Good substitutes for salt include: fresh herb blends, hot sauce, lemon, garlic, nash, vinegar, dry mustard, parsley, cilantro, horseradish, tomato paste, regular margarine, mayonnaise, unsalted butter, cream cheese, vegetable oil, cream, low-salt salad dressing and gravy.  Avoid: Regular ketchup, relishes, pickles, soy sauce, teriyaki sauce, Worcestershire sauce, BBQ sauce, tartar sauce, meat tenderizer, chili sauce, regular gravy, regular salad dressing, salted butter  Soups  Ok: Low-salt soups and broths made with allowed foods  Avoid: Bouillon cubes, soups with smoked or salted meats, regular soup and broth  Vegetables  Ok: Most vegetables are okay; also low-salt tomato and vegetable juices  Avoid: Sauerkraut and other brine-soaked vegetables; pickles and other pickled vegetables; tomato juice, olives  Date Last Reviewed: 8/1/2016 © 2000-2017 [employer]. [address] [place] [id number]. All rights reserved. This information is not intended as a substitute for professional medical care. Always follow your healthcare professional's instructions.

## 2020-01-28 ENCOUNTER — HOSPITAL ENCOUNTER (OUTPATIENT)
Dept: RADIOLOGY | Facility: HOSPITAL | Age: 50
Discharge: HOME | End: 2020-01-28
Attending: INTERNAL MEDICINE
Payer: COMMERCIAL

## 2020-01-28 VITALS — WEIGHT: 232 LBS | BODY MASS INDEX: 42.69 KG/M2 | HEIGHT: 62 IN

## 2020-01-28 DIAGNOSIS — Z12.39 BREAST CANCER SCREENING: ICD-10-CM

## 2020-01-28 PROCEDURE — 77067 SCR MAMMO BI INCL CAD: CPT | Mod: 26,,, | Performed by: RADIOLOGY

## 2020-01-28 PROCEDURE — 77063 MAMMO DIGITAL SCREENING BILAT WITH TOMOSYNTHESIS_CAD: ICD-10-PCS | Mod: 26,,, | Performed by: RADIOLOGY

## 2020-01-28 PROCEDURE — 77067 MAMMO DIGITAL SCREENING BILAT WITH TOMOSYNTHESIS_CAD: ICD-10-PCS | Mod: 26,,, | Performed by: RADIOLOGY

## 2020-01-28 PROCEDURE — 77067 SCR MAMMO BI INCL CAD: CPT | Mod: TC

## 2020-01-28 PROCEDURE — 77063 BREAST TOMOSYNTHESIS BI: CPT | Mod: 26,,, | Performed by: RADIOLOGY

## 2020-08-03 LAB
ALBUMIN/GLOB SERPL ELPH: 1.7 {RATIO} (ref 1–2.6)
ALBUMIN: 4.3 (ref 3.5–5.2)
ALP SERPL-CCNC: 86 U/L (ref 40–123)
ALT SERPL W P-5'-P-CCNC: 12 U/L (ref 5–40)
AST SERPL-CCNC: 14 U/L (ref 9–40)
BILIRUB SERPL-MCNC: 0.2 MG/DL (ref 0.1–1.2)
BUN SERPL-MCNC: 13 MG/DL (ref 6–20)
BUN/CREAT SERPL: 14 (ref 6–28)
CALCIUM SERPL-MCNC: 8.9 MG/DL (ref 8.5–10.5)
CHLORIDE SERPL-SCNC: 107 MMOL/L (ref 95–107)
CO2 SERPL-SCNC: 21 MMOL/L (ref 19–31)
CREAT SERPL-MCNC: 0.9 MG/DL (ref 0.6–1.3)
EGFR IF AFRICAN AMERICAN: 86
EST. GFR  (NON AFRICAN AMERICAN): 75 ML/MIN/1.73 M2
GLOBULIN: 2.5 (ref 1.9–3.7)
GLUCOSE SERPL-MCNC: 125 MG/DL (ref 70–99)
HBA1C MFR BLD: 5.7 % (ref 4.2–5.6)
POTASSIUM SERPL-SCNC: 4.1 MMOL/L (ref 3.5–5.4)
PROT SNV-MCNC: 6.8 G/L (ref 6.1–8.3)
SODIUM BLD-SCNC: 141 MMOL/L (ref 133–146)

## 2020-08-24 ENCOUNTER — PATIENT OUTREACH (OUTPATIENT)
Dept: ADMINISTRATIVE | Facility: OTHER | Age: 50
End: 2020-08-24

## 2020-08-24 NOTE — PROGRESS NOTES
Health Maintenance Due   Topic Date Due    Hepatitis C Screening  1970    Pneumococcal Vaccine (Medium Risk) (1 of 1 - PPSV23) 08/12/1990     Updates were requested from care everywhere.  Chart was reviewed for overdue Proactive [facility] Encounters ([facility]) topics (CRS, Breast Cancer Screening, Eye exam)  Health Maintenance has been updated.  [facility] immunization registry triggered.  Immunizations were reconciled.

## 2020-08-25 ENCOUNTER — OFFICE VISIT (OUTPATIENT)
Dept: OBSTETRICS AND GYNECOLOGY | Facility: CLINIC | Age: 50
End: 2020-08-25
Payer: COMMERCIAL

## 2020-08-25 VITALS
WEIGHT: 235 LBS | SYSTOLIC BLOOD PRESSURE: 164 MMHG | DIASTOLIC BLOOD PRESSURE: 100 MMHG | BODY MASS INDEX: 43.24 KG/M2 | HEIGHT: 62 IN

## 2020-08-25 DIAGNOSIS — Z12.4 SCREENING FOR CERVICAL CANCER: ICD-10-CM

## 2020-08-25 DIAGNOSIS — Z12.31 BREAST CANCER SCREENING BY MAMMOGRAM: ICD-10-CM

## 2020-08-25 DIAGNOSIS — Z01.419 ENCOUNTER FOR GYNECOLOGICAL EXAMINATION (GENERAL) (ROUTINE) WITHOUT ABNORMAL FINDINGS: Primary | ICD-10-CM

## 2020-08-25 PROCEDURE — 3008F BODY MASS INDEX DOCD: CPT | Mod: CPTII,S$GLB,, | Performed by: OBSTETRICS & GYNECOLOGY

## 2020-08-25 PROCEDURE — 3077F SYST BP >= 140 MM HG: CPT | Mod: CPTII,S$GLB,, | Performed by: OBSTETRICS & GYNECOLOGY

## 2020-08-25 PROCEDURE — 3008F PR BODY MASS INDEX (BMI) DOCUMENTED: ICD-10-PCS | Mod: CPTII,S$GLB,, | Performed by: OBSTETRICS & GYNECOLOGY

## 2020-08-25 PROCEDURE — 88175 CYTOPATH C/V AUTO FLUID REDO: CPT

## 2020-08-25 PROCEDURE — 87624 HPV HI-RISK TYP POOLED RSLT: CPT

## 2020-08-25 PROCEDURE — 99999 PR PBB SHADOW E&M-EST. PATIENT-LVL III: CPT | Mod: PBBFAC,,, | Performed by: OBSTETRICS & GYNECOLOGY

## 2020-08-25 PROCEDURE — 99386 PR PREVENTIVE VISIT,NEW,40-64: ICD-10-PCS | Mod: S$GLB,,, | Performed by: OBSTETRICS & GYNECOLOGY

## 2020-08-25 PROCEDURE — 99999 PR PBB SHADOW E&M-EST. PATIENT-LVL III: ICD-10-PCS | Mod: PBBFAC,,, | Performed by: OBSTETRICS & GYNECOLOGY

## 2020-08-25 PROCEDURE — 3080F DIAST BP >= 90 MM HG: CPT | Mod: CPTII,S$GLB,, | Performed by: OBSTETRICS & GYNECOLOGY

## 2020-08-25 PROCEDURE — 3080F PR MOST RECENT DIASTOLIC BLOOD PRESSURE >= 90 MM HG: ICD-10-PCS | Mod: CPTII,S$GLB,, | Performed by: OBSTETRICS & GYNECOLOGY

## 2020-08-25 PROCEDURE — 3077F PR MOST RECENT SYSTOLIC BLOOD PRESSURE >= 140 MM HG: ICD-10-PCS | Mod: CPTII,S$GLB,, | Performed by: OBSTETRICS & GYNECOLOGY

## 2020-08-25 PROCEDURE — 99386 PREV VISIT NEW AGE 40-64: CPT | Mod: S$GLB,,, | Performed by: OBSTETRICS & GYNECOLOGY

## 2020-08-25 RX ORDER — MUPIROCIN 20 MG/G
OINTMENT TOPICAL
COMMUNITY
Start: 2020-08-03 | End: 2022-07-28

## 2020-08-25 RX ORDER — NAPROXEN 500 MG/1
TABLET ORAL
COMMUNITY
Start: 2020-08-03 | End: 2022-07-28

## 2020-08-25 RX ORDER — AMLODIPINE BESYLATE AND ATORVASTATIN CALCIUM 10; 20 MG/1; MG/1
1 TABLET, FILM COATED ORAL DAILY
COMMUNITY
End: 2022-07-28

## 2020-08-25 RX ORDER — LOSARTAN POTASSIUM 25 MG/1
25 TABLET ORAL DAILY
COMMUNITY
End: 2022-07-28

## 2020-08-25 RX ORDER — SULFAMETHOXAZOLE AND TRIMETHOPRIM 400; 80 MG/1; MG/1
1 TABLET ORAL 2 TIMES DAILY
Qty: 20 TABLET | Refills: 0 | Status: SHIPPED | OUTPATIENT
Start: 2020-08-25 | End: 2020-09-04

## 2020-08-25 NOTE — PROGRESS NOTES
Subjective:       Patient ID: Latonya Blevins is a 50 y.o. female.    Chief Complaint:  Well Woman      History of Present Illness  HPI  Annual Exam-Premenopausal  Patient presents for annual exam. The patient reports bump on breast--currently applying topical abx ointment. The patient is sexually active; current partner of 7 yrs; . GYN screening history: last pap: was normal and patient does not recall when last pap was and last mammogram: approximate date 2020 and was normal. The patient wears seatbelts: yes. The patient participates in regular exercise: no. -stretching, Has the patient ever been transfused or tattooed?: no. The patient reports that there is not domestic violence in her life.      Menses monthly, flow 3-4 days; pads-super/overnight; change q 2 hrs; doubles up; soaks through; heavy for 2 days; dysmenorrhea before cycle starts; --but tolerable;     occas leak with cough/sneeze      GYN & OB History  Patient's last menstrual period was 2020.   Date of Last Pap: No result found    OB History    Para Term  AB Living   8 1 1   7     SAB TAB Ectopic Multiple Live Births   7              # Outcome Date GA Lbr Dominick/2nd Weight Sex Delivery Anes PTL Lv   8 SAB 2018           7 SAB            6 SAB            5 SAB            4 SAB            3 SAB            2 SAB            1 Term                Review of Systems  Review of Systems   Integumentary:  Positive for rash.   All other systems reviewed and are negative.          Objective:      Physical Exam:   Constitutional: She appears well-developed.     Eyes: Pupils are equal, round, and reactive to light. Conjunctivae and EOM are normal.    Neck: Normal range of motion. Neck supple.     Pulmonary/Chest: Effort normal. Right breast exhibits no mass, no nipple discharge, no skin change and no tenderness. Left breast exhibits no mass, no nipple discharge, no skin change and no tenderness. Breasts are symmetrical.             Abdominal: Soft.     Genitourinary:    Vagina, uterus and rectum normal.      Pelvic exam was performed with patient supine.   Cervix is normal. Right adnexum displays no mass and no tenderness. Left adnexum displays no mass and no tenderness. No erythema, bleeding, rectocele, cystocele or unspecified prolapse of vaginal walls in the vagina. Labial bartholins normal.negative for vaginal discharge       Uterus Size: 6 cm   Musculoskeletal: Normal range of motion.       Neurological: She is alert.    Skin: Skin is warm.    Psychiatric: She has a normal mood and affect.           Assessment:         Encounter Diagnoses   Name Primary?    Breast cancer screening by mammogram     Encounter for gynecological examination (general) (routine) without abnormal findings Yes    Screening for cervical cancer              Plan:      Continue annual well woman exam.  Pap today; Reviewed updated recommendations for pap smears (every 3 years) in low risk patients.   Recommend annual pelvic exams.  Reviewed recommendations for annual CBE.  mammo ordered, continue yearly until age 76  Continue warm compress, abx ointment,rx sent for septra  Encouraged diet, exercise, weight loss  Pt to follow up with pcp regarding elevated bp   [facility] colonoscopy due next year

## 2020-09-02 LAB
HPV HR 12 DNA SPEC QL NAA+PROBE: NEGATIVE
HPV16 AG SPEC QL: NEGATIVE
HPV18 DNA SPEC QL NAA+PROBE: NEGATIVE

## 2020-09-09 LAB
FINAL PATHOLOGIC DIAGNOSIS: NORMAL
Lab: NORMAL

## 2020-09-22 ENCOUNTER — PATIENT OUTREACH (OUTPATIENT)
Dept: ADMINISTRATIVE | Facility: HOSPITAL | Age: 50
End: 2020-09-22

## 2020-09-22 NOTE — PROGRESS NOTES
[facility] report for BP. Need updated reading.    LM to schedule annual exam, follow up hypertension. Clarify if taking bp meds who is filling.

## 2020-10-15 ENCOUNTER — PATIENT OUTREACH (OUTPATIENT)
Dept: ADMINISTRATIVE | Facility: HOSPITAL | Age: 50
End: 2020-10-15

## 2020-12-02 ENCOUNTER — PATIENT OUTREACH (OUTPATIENT)
Dept: ADMINISTRATIVE | Facility: OTHER | Age: 50
End: 2020-12-02

## 2020-12-03 ENCOUNTER — OFFICE VISIT (OUTPATIENT)
Dept: OBSTETRICS AND GYNECOLOGY | Facility: CLINIC | Age: 50
End: 2020-12-03
Payer: COMMERCIAL

## 2020-12-03 ENCOUNTER — LAB VISIT (OUTPATIENT)
Dept: LAB | Facility: HOSPITAL | Age: 50
End: 2020-12-03
Attending: OBSTETRICS & GYNECOLOGY
Payer: COMMERCIAL

## 2020-12-03 VITALS
WEIGHT: 236.31 LBS | SYSTOLIC BLOOD PRESSURE: 136 MMHG | DIASTOLIC BLOOD PRESSURE: 102 MMHG | BODY MASS INDEX: 43.23 KG/M2

## 2020-12-03 DIAGNOSIS — N92.4 ABNORMAL PERIMENOPAUSAL BLEEDING: ICD-10-CM

## 2020-12-03 DIAGNOSIS — N92.4 ABNORMAL PERIMENOPAUSAL BLEEDING: Primary | ICD-10-CM

## 2020-12-03 LAB
ERYTHROCYTE [DISTWIDTH] IN BLOOD BY AUTOMATED COUNT: 17.7 % (ref 11.5–14.5)
HCT VFR BLD AUTO: 34.2 % (ref 37–48.5)
HGB BLD-MCNC: 10.2 G/DL (ref 12–16)
MCH RBC QN AUTO: 26.4 PG (ref 27–31)
MCHC RBC AUTO-ENTMCNC: 29.8 G/DL (ref 32–36)
MCV RBC AUTO: 88 FL (ref 82–98)
PLATELET # BLD AUTO: 290 K/UL (ref 150–350)
PMV BLD AUTO: 12.8 FL (ref 9.2–12.9)
RBC # BLD AUTO: 3.87 M/UL (ref 4–5.4)
WBC # BLD AUTO: 11.2 K/UL (ref 3.9–12.7)

## 2020-12-03 PROCEDURE — 3008F BODY MASS INDEX DOCD: CPT | Mod: CPTII,S$GLB,, | Performed by: OBSTETRICS & GYNECOLOGY

## 2020-12-03 PROCEDURE — 3075F PR MOST RECENT SYSTOLIC BLOOD PRESS GE 130-139MM HG: ICD-10-PCS | Mod: CPTII,S$GLB,, | Performed by: OBSTETRICS & GYNECOLOGY

## 2020-12-03 PROCEDURE — 3008F PR BODY MASS INDEX (BMI) DOCUMENTED: ICD-10-PCS | Mod: CPTII,S$GLB,, | Performed by: OBSTETRICS & GYNECOLOGY

## 2020-12-03 PROCEDURE — 1126F PR PAIN SEVERITY QUANTIFIED, NO PAIN PRESENT: ICD-10-PCS | Mod: S$GLB,,, | Performed by: OBSTETRICS & GYNECOLOGY

## 2020-12-03 PROCEDURE — 85027 COMPLETE CBC AUTOMATED: CPT

## 2020-12-03 PROCEDURE — 84443 ASSAY THYROID STIM HORMONE: CPT

## 2020-12-03 PROCEDURE — 84481 FREE ASSAY (FT-3): CPT

## 2020-12-03 PROCEDURE — 36415 COLL VENOUS BLD VENIPUNCTURE: CPT | Mod: PO

## 2020-12-03 PROCEDURE — 99213 OFFICE O/P EST LOW 20 MIN: CPT | Mod: S$GLB,,, | Performed by: OBSTETRICS & GYNECOLOGY

## 2020-12-03 PROCEDURE — 3075F SYST BP GE 130 - 139MM HG: CPT | Mod: CPTII,S$GLB,, | Performed by: OBSTETRICS & GYNECOLOGY

## 2020-12-03 PROCEDURE — 1126F AMNT PAIN NOTED NONE PRSNT: CPT | Mod: S$GLB,,, | Performed by: OBSTETRICS & GYNECOLOGY

## 2020-12-03 PROCEDURE — 99999 PR PBB SHADOW E&M-EST. PATIENT-LVL III: ICD-10-PCS | Mod: PBBFAC,,, | Performed by: OBSTETRICS & GYNECOLOGY

## 2020-12-03 PROCEDURE — 3080F DIAST BP >= 90 MM HG: CPT | Mod: CPTII,S$GLB,, | Performed by: OBSTETRICS & GYNECOLOGY

## 2020-12-03 PROCEDURE — 99999 PR PBB SHADOW E&M-EST. PATIENT-LVL III: CPT | Mod: PBBFAC,,, | Performed by: OBSTETRICS & GYNECOLOGY

## 2020-12-03 PROCEDURE — 3080F PR MOST RECENT DIASTOLIC BLOOD PRESSURE >= 90 MM HG: ICD-10-PCS | Mod: CPTII,S$GLB,, | Performed by: OBSTETRICS & GYNECOLOGY

## 2020-12-03 PROCEDURE — 99213 PR OFFICE/OUTPT VISIT, EST, LEVL III, 20-29 MIN: ICD-10-PCS | Mod: S$GLB,,, | Performed by: OBSTETRICS & GYNECOLOGY

## 2020-12-03 PROCEDURE — 84436 ASSAY OF TOTAL THYROXINE: CPT

## 2020-12-03 RX ORDER — LOSARTAN POTASSIUM 100 MG/1
TABLET ORAL
COMMUNITY
Start: 2020-11-30 | End: 2022-07-28 | Stop reason: ALTCHOICE

## 2020-12-03 RX ORDER — ERGOCALCIFEROL 1.25 MG/1
50000 CAPSULE ORAL
COMMUNITY
Start: 2020-09-30 | End: 2023-01-17

## 2020-12-03 NOTE — PROGRESS NOTES
Subjective:       Patient ID: Latonya Blevins is a 50 y.o. female.    Chief Complaint:  Menorrhagia      History of Present Illness  HPI  here for problem   Had monthly menses aug, sep, October but reports menses started 4d early in November   Pt reports heavy/clotting bleeding, sometimes passing large clots  Feels bleeding is now subsiding    Seen by pcp and given rx for progesterone--unsure of dose and has not started yet    Feeling weak  (desires fertility)    GYN & OB History  Patient's last menstrual period was 2020.   Date of Last Pap: 2020    OB History    Para Term  AB Living   8 1 1   7     SAB TAB Ectopic Multiple Live Births   7              # Outcome Date GA Lbr Dominick/2nd Weight Sex Delivery Anes PTL Lv   8  2018           7 SAB            6 SAB            5 SAB            4 SAB            3 SAB            2 SAB            1 Term                Review of Systems  Review of Systems   Genitourinary: Positive for menorrhagia and menstrual problem.   All other systems reviewed and are negative.          Objective:      Physical Exam:   Constitutional: She appears well-developed.     Eyes: Pupils are equal, round, and reactive to light. Conjunctivae and EOM are normal.    Neck: Normal range of motion. Neck supple.     Pulmonary/Chest: Effort normal.        Abdominal: Soft.             Musculoskeletal: Normal range of motion.       Neurological: She is alert.    Skin: Skin is warm.    Psychiatric: She has a normal mood and affect.           Assessment:     Encounter Diagnosis   Name Primary?    Abnormal perimenopausal bleeding Yes                 Plan:   Cbc, tsh today; suspect irreg bleeding related to perimenopause  Pt prefers continued observation for now; aware to start progesterone if bleeding increases  Aware next menses may not occur until 30 d  Pelvic sono ordered to eval ut for etiol of bleeding  Add daily iron

## 2020-12-04 ENCOUNTER — TELEPHONE (OUTPATIENT)
Dept: RADIOLOGY | Facility: HOSPITAL | Age: 50
End: 2020-12-04

## 2020-12-04 LAB
T3FREE SERPL-MCNC: 2.6 PG/ML (ref 2.3–4.2)
T4 SERPL-MCNC: 5.3 UG/DL (ref 4.5–11.5)
TSH SERPL DL<=0.005 MIU/L-ACNC: 2.94 UIU/ML (ref 0.4–4)

## 2021-01-29 ENCOUNTER — HOSPITAL ENCOUNTER (OUTPATIENT)
Dept: RADIOLOGY | Facility: HOSPITAL | Age: 51
Discharge: HOME | End: 2021-01-29
Attending: OBSTETRICS & GYNECOLOGY
Payer: COMMERCIAL

## 2021-01-29 VITALS — WEIGHT: 236.31 LBS | HEIGHT: 62 IN | BODY MASS INDEX: 43.49 KG/M2

## 2021-01-29 DIAGNOSIS — Z12.31 BREAST CANCER SCREENING BY MAMMOGRAM: ICD-10-CM

## 2021-01-29 PROCEDURE — 77067 SCR MAMMO BI INCL CAD: CPT | Mod: 26,,, | Performed by: RADIOLOGY

## 2021-01-29 PROCEDURE — 77067 MAMMO DIGITAL SCREENING BILAT WITH TOMOSYNTHESIS_CAD: ICD-10-PCS | Mod: 26,,, | Performed by: RADIOLOGY

## 2021-01-29 PROCEDURE — 77063 BREAST TOMOSYNTHESIS BI: CPT | Mod: 26,,, | Performed by: RADIOLOGY

## 2021-01-29 PROCEDURE — 77067 SCR MAMMO BI INCL CAD: CPT | Mod: TC

## 2021-01-29 PROCEDURE — 77063 MAMMO DIGITAL SCREENING BILAT WITH TOMOSYNTHESIS_CAD: ICD-10-PCS | Mod: 26,,, | Performed by: RADIOLOGY

## 2021-02-03 ENCOUNTER — TELEPHONE (OUTPATIENT)
Dept: OBSTETRICS AND GYNECOLOGY | Facility: CLINIC | Age: 51
End: 2021-02-03

## 2021-02-05 ENCOUNTER — TELEPHONE (OUTPATIENT)
Dept: OBSTETRICS AND GYNECOLOGY | Facility: CLINIC | Age: 51
End: 2021-02-05

## 2021-02-23 ENCOUNTER — TELEPHONE (OUTPATIENT)
Dept: OBSTETRICS AND GYNECOLOGY | Facility: CLINIC | Age: 51
End: 2021-02-23

## 2021-04-13 ENCOUNTER — PATIENT OUTREACH (OUTPATIENT)
Dept: ADMINISTRATIVE | Facility: HOSPITAL | Age: 51
End: 2021-04-13

## 2021-04-28 ENCOUNTER — PATIENT MESSAGE (OUTPATIENT)
Dept: RESEARCH | Facility: HOSPITAL | Age: 51
End: 2021-04-28

## 2021-05-11 ENCOUNTER — PATIENT OUTREACH (OUTPATIENT)
Dept: ADMINISTRATIVE | Facility: HOSPITAL | Age: 51
End: 2021-05-11

## 2022-07-19 ENCOUNTER — HOSPITAL ENCOUNTER (EMERGENCY)
Facility: HOSPITAL | Age: 52
Discharge: HOME | End: 2022-07-19
Attending: EMERGENCY MEDICINE
Payer: COMMERCIAL

## 2022-07-19 VITALS
DIASTOLIC BLOOD PRESSURE: 99 MMHG | BODY MASS INDEX: 41.2 KG/M2 | WEIGHT: 223.88 LBS | HEART RATE: 109 BPM | OXYGEN SATURATION: 100 % | SYSTOLIC BLOOD PRESSURE: 171 MMHG | RESPIRATION RATE: 20 BRPM | HEIGHT: 62 IN | TEMPERATURE: 99 F

## 2022-07-19 DIAGNOSIS — K08.89 PAIN, DENTAL: Primary | ICD-10-CM

## 2022-07-19 PROCEDURE — 99283 EMERGENCY DEPT VISIT LOW MDM: CPT

## 2022-07-19 RX ORDER — HYDROCODONE BITARTRATE AND ACETAMINOPHEN 7.5; 325 MG/1; MG/1
1 TABLET ORAL EVERY 4 HOURS PRN
Qty: 10 TABLET | Refills: 0 | Status: SHIPPED | OUTPATIENT
Start: 2022-07-19 | End: 2023-01-09

## 2022-07-19 NOTE — ED PROVIDER NOTES
History      Chief Complaint   Patient presents with    Abscess     Tooth abscess, L side. Prescribed Abx yesterday.       Review of patient's allergies indicates:  No Known Allergies     HPI   HPI    2022, 3:41 PM   History obtained from the patient      History of Present Illness: Latonya Blevins is a 51 y.o. female patient who presents to the Emergency Department for dental pain for 2-3 days.  She just started taking augmentin last night.  She is concerned ibuprofen is raising her BP.  Denies f/n/v.  Symptoms are constant and moderate in severity. No further complaints or concerns at this time.     Blood pressure is elevated.  Pt denies cp, sob, ha, dizziness, vision change.          PCP: Anjelica Laguerre DO       Past Medical History:  Past Medical History:   Diagnosis Date    Hypertension          Past Surgical History:  Past Surgical History:   Procedure Laterality Date     SECTION  2008    DILATION AND CURETTAGE OF UTERUS  2018    sab;            Family History:  Family History   Problem Relation Age of Onset    Brain cancer Mother     Breast cancer Mother 62    Lung cancer Father     Brain cancer Father     Breast cancer Maternal Aunt     Breast cancer Paternal Aunt            Social History:  Social History     Tobacco Use    Smoking status: Current Every Day Smoker     Packs/day: 0.50     Types: Cigarettes     Start date:     Smokeless tobacco: Never Used   Substance and Sexual Activity    Alcohol use: No    Drug use: No    Sexual activity: Yes     Partners: Male       ROS   Constitutional: Negative for chills and fever.   HENT: Positive for dental problem. Negative for sore throat.    Respiratory: Negative for chest tightness and shortness of breath.    Cardiovascular: Negative for chest pain.   Gastrointestinal: Negative for nausea and vomiting.   Genitourinary: Negative for dysuria.   Musculoskeletal: Negative for back pain.   Skin: Negative for pallor  "and rash.   Neurological: Negative for weakness.   Hematological: Does not bruise/bleed easily.   All other systems reviewed and are negative.  Review of Systems    Physical Exam      Initial Vitals [07/19/22 1539]   BP Pulse Resp Temp SpO2   (!) 171/99 109 20 99.1 °F (37.3 °C) 100 %      MAP       --         Physical Exam  Vital signs and nursing notes reviewed.  Constitutional: Patient is in NAD. Awake and alert. Well-developed and well-nourished.  Head: Atraumatic. Normocephalic.  Eyes: PERRL. EOM intact. Conjunctivae nl. No scleral icterus.  ENT: Mucous membranes are moist. Oropharynx is clear.  Left gingival ttp, mild erythema.  +dental caries.  Mild left lower facial edema  Neck: Supple. No JVD. No lymphadenopathy.  No meningismus  Cardiovascular: Regular rate and rhythm. No murmurs, rubs, or gallops. Distal pulses are 2+ and symmetric.  Pulmonary/Chest: No respiratory distress. Clear to auscultation bilaterally. No wheezing, rales, or rhonchi.  Abdominal: Soft. Non-distended. No TTP. No rebound, guarding, or rigidity. Good bowel sounds.  Genitourinary: No CVA tenderness  Musculoskeletal: Moves all extremities. No edema.   Skin: Warm and dry.  Neurological: Awake and alert. No acute focal neurological deficits are appreciated.  Psychiatric: Normal affect. Good eye contact. Appropriate in content.      ED Course      Procedures  ED Vital Signs:  Vitals:    07/19/22 1539   BP: (!) 171/99   Pulse: 109   Resp: 20   Temp: 99.1 °F (37.3 °C)   TempSrc: Oral   SpO2: 100%   Weight: 101.5 kg (223 lb 14 oz)   Height: 5' 2" (1.575 m)                 Imaging Results:  Imaging Results    None            The Emergency Provider reviewed the vital signs and test results, which are outlined above.    ED Discussion         All findings were reviewed with the patient/family in detail.  Findings seem to be most consistent with a diagnosis of dental pain and dental caries.   All remaining questions and concerns were addressed at " that time.  Patient/family has been counseled regarding the need for follow-up as well as the indication to return to the emergency room should new or worrisome developments occur.        Medication(s) given in the ER:  Medications - No data to display         Follow-up Information     [facility] - Emergency Dept. In 1 day.    Specialty: Emergency Medicine  Contact information:  [id number] [facility] [address] [phone]           Your Dentist In 2 days.                              Medication List      START taking these medications    HYDROcodone-acetaminophen 7.5-325 mg per tablet  Commonly known as: NORCO  Take 1 tablet by mouth every 4 (four) hours as needed for Pain.        ASK your doctor about these medications    amlodipine-atorvastatin 10-20 mg per tablet  Commonly known as: CADUET     cyclobenzaprine 10 MG tablet  Commonly known as: FLEXERIL  Take 0.5 tablets (5 mg total) by mouth 3 (three) times daily as needed for Muscle spasms.     ergocalciferol 50,000 unit Cap  Commonly known as: ERGOCALCIFEROL     hydroCHLOROthiazide 25 MG tablet  Commonly known as: HYDRODIURIL  Take 1 tablet (25 mg total) by mouth once daily.     * losartan 25 MG tablet  Commonly known as: COZAAR     * losartan 100 MG tablet  Commonly known as: COZAAR     mupirocin 2 % ointment  Commonly known as: BACTROBAN     naproxen 500 MG tablet  Commonly known as: NAPROSYN         * This list has 2 medication(s) that are the same as other medications prescribed for you. Read the directions carefully, and ask your doctor or other care provider to review them with you.               Where to Get Your Medications      You can get these medications from any pharmacy    Bring a paper prescription for each of these medications  · HYDROcodone-acetaminophen 7.5-325 mg per tablet             Medical Decision Making              MDM               Clinical Impression:        ICD-10-CM ICD-9-CM   1. Pain, dental  K08.89 525.9               Disposition  Stable  Discharged       Sonya Caceres PA-C  07/19/22 1710       Sonya Caceres PA-C  07/19/22 1711

## 2022-07-28 ENCOUNTER — HOSPITAL ENCOUNTER (OUTPATIENT)
Facility: HOSPITAL | Age: 52
Discharge: HOME | End: 2022-07-31
Attending: EMERGENCY MEDICINE | Admitting: STUDENT IN AN ORGANIZED HEALTH CARE EDUCATION/TRAINING PROGRAM
Payer: COMMERCIAL

## 2022-07-28 DIAGNOSIS — R07.9 CHEST PAIN: ICD-10-CM

## 2022-07-28 DIAGNOSIS — K04.7 DENTAL ABSCESS: ICD-10-CM

## 2022-07-28 DIAGNOSIS — L02.01 ABSCESS OF PAROTID MASSETERIC REGION OF FACE: Primary | ICD-10-CM

## 2022-07-28 LAB
ALBUMIN SERPL BCP-MCNC: 3.4 G/DL (ref 3.5–5.2)
ALP SERPL-CCNC: 81 U/L (ref 55–135)
ALT SERPL W/O P-5'-P-CCNC: 14 U/L (ref 10–44)
ANION GAP SERPL CALC-SCNC: 14 MMOL/L (ref 8–16)
AST SERPL-CCNC: 13 U/L (ref 10–40)
BASOPHILS # BLD AUTO: 0.06 K/UL (ref 0–0.2)
BASOPHILS NFR BLD: 0.4 % (ref 0–1.9)
BILIRUB SERPL-MCNC: 0.2 MG/DL (ref 0.1–1)
BUN SERPL-MCNC: 9 MG/DL (ref 6–20)
CALCIUM SERPL-MCNC: 9.3 MG/DL (ref 8.7–10.5)
CHLORIDE SERPL-SCNC: 105 MMOL/L (ref 95–110)
CO2 SERPL-SCNC: 22 MMOL/L (ref 23–29)
CREAT SERPL-MCNC: 0.8 MG/DL (ref 0.5–1.4)
DIFFERENTIAL METHOD: ABNORMAL
EOSINOPHIL # BLD AUTO: 0.1 K/UL (ref 0–0.5)
EOSINOPHIL NFR BLD: 0.7 % (ref 0–8)
ERYTHROCYTE [DISTWIDTH] IN BLOOD BY AUTOMATED COUNT: 14.5 % (ref 11.5–14.5)
EST. GFR  (AFRICAN AMERICAN): >60 ML/MIN/1.73 M^2
EST. GFR  (NON AFRICAN AMERICAN): >60 ML/MIN/1.73 M^2
GLUCOSE SERPL-MCNC: 108 MG/DL (ref 70–110)
HCT VFR BLD AUTO: 34.8 % (ref 37–48.5)
HGB BLD-MCNC: 11.5 G/DL (ref 12–16)
IMM GRANULOCYTES # BLD AUTO: 0.06 K/UL (ref 0–0.04)
IMM GRANULOCYTES NFR BLD AUTO: 0.4 % (ref 0–0.5)
LYMPHOCYTES # BLD AUTO: 1.5 K/UL (ref 1–4.8)
LYMPHOCYTES NFR BLD: 11.2 % (ref 18–48)
MCH RBC QN AUTO: 28.5 PG (ref 27–31)
MCHC RBC AUTO-ENTMCNC: 33 G/DL (ref 32–36)
MCV RBC AUTO: 86 FL (ref 82–98)
MONOCYTES # BLD AUTO: 1.2 K/UL (ref 0.3–1)
MONOCYTES NFR BLD: 9.1 % (ref 4–15)
NEUTROPHILS # BLD AUTO: 10.4 K/UL (ref 1.8–7.7)
NEUTROPHILS NFR BLD: 78.2 % (ref 38–73)
NRBC BLD-RTO: 0 /100 WBC
OVALOCYTES BLD QL SMEAR: ABNORMAL
PLATELET # BLD AUTO: 321 K/UL (ref 150–450)
PMV BLD AUTO: 11.1 FL (ref 9.2–12.9)
POTASSIUM SERPL-SCNC: 4.2 MMOL/L (ref 3.5–5.1)
PROT SERPL-MCNC: 6.7 G/DL (ref 6–8.4)
RBC # BLD AUTO: 4.04 M/UL (ref 4–5.4)
SODIUM SERPL-SCNC: 141 MMOL/L (ref 136–145)
WBC # BLD AUTO: 13.34 K/UL (ref 3.9–12.7)

## 2022-07-28 PROCEDURE — 80053 COMPREHEN METABOLIC PANEL: CPT | Performed by: EMERGENCY MEDICINE

## 2022-07-28 PROCEDURE — 25000003 PHARM REV CODE 250: Performed by: EMERGENCY MEDICINE

## 2022-07-28 PROCEDURE — 63600175 PHARM REV CODE 636 W HCPCS: Performed by: EMERGENCY MEDICINE

## 2022-07-28 PROCEDURE — 99285 EMERGENCY DEPT VISIT HI MDM: CPT | Mod: 25

## 2022-07-28 PROCEDURE — 25500020 PHARM REV CODE 255: Performed by: EMERGENCY MEDICINE

## 2022-07-28 PROCEDURE — 85025 COMPLETE CBC W/AUTO DIFF WBC: CPT | Performed by: EMERGENCY MEDICINE

## 2022-07-28 PROCEDURE — 96365 THER/PROPH/DIAG IV INF INIT: CPT | Mod: 59

## 2022-07-28 PROCEDURE — 96375 TX/PRO/DX INJ NEW DRUG ADDON: CPT

## 2022-07-28 PROCEDURE — 87040 BLOOD CULTURE FOR BACTERIA: CPT | Mod: 59 | Performed by: EMERGENCY MEDICINE

## 2022-07-28 RX ORDER — SODIUM CHLORIDE 9 MG/ML
1000 INJECTION, SOLUTION INTRAVENOUS
Status: COMPLETED | OUTPATIENT
Start: 2022-07-28 | End: 2022-07-29

## 2022-07-28 RX ORDER — AMLODIPINE BESYLATE 5 MG/1
5 TABLET ORAL DAILY
Status: ON HOLD | COMMUNITY
Start: 2022-07-01 | End: 2022-07-31 | Stop reason: SDUPTHER

## 2022-07-28 RX ORDER — MORPHINE SULFATE 4 MG/ML
4 INJECTION, SOLUTION INTRAMUSCULAR; INTRAVENOUS
Status: COMPLETED | OUTPATIENT
Start: 2022-07-28 | End: 2022-07-28

## 2022-07-28 RX ORDER — ACETAMINOPHEN, ASPIRIN (NSAID), AND CAFFEINE 250; 250; 65 MG/1; MG/1; MG/1
2 TABLET, FILM COATED ORAL EVERY 6 HOURS PRN
COMMUNITY
End: 2023-01-17

## 2022-07-28 RX ORDER — LOSARTAN POTASSIUM 50 MG/1
50 TABLET ORAL DAILY
COMMUNITY
Start: 2022-07-01 | End: 2023-02-06 | Stop reason: SDUPTHER

## 2022-07-28 RX ORDER — AMLODIPINE BESYLATE 5 MG/1
5 TABLET ORAL
Status: COMPLETED | OUTPATIENT
Start: 2022-07-28 | End: 2022-07-28

## 2022-07-28 RX ORDER — CLINDAMYCIN PHOSPHATE 900 MG/50ML
900 INJECTION, SOLUTION INTRAVENOUS
Status: COMPLETED | OUTPATIENT
Start: 2022-07-28 | End: 2022-07-28

## 2022-07-28 RX ORDER — CLINDAMYCIN PHOSPHATE 600 MG/50ML
600 INJECTION, SOLUTION INTRAVENOUS
Status: DISCONTINUED | OUTPATIENT
Start: 2022-07-28 | End: 2022-07-30

## 2022-07-28 RX ADMIN — CLINDAMYCIN IN 5 PERCENT DEXTROSE 900 MG: 18 INJECTION, SOLUTION INTRAVENOUS at 10:07

## 2022-07-28 RX ADMIN — AMLODIPINE BESYLATE 5 MG: 5 TABLET ORAL at 09:07

## 2022-07-28 RX ADMIN — PROMETHAZINE HYDROCHLORIDE 12.5 MG: 25 INJECTION INTRAMUSCULAR; INTRAVENOUS at 03:07

## 2022-07-28 RX ADMIN — IOHEXOL 75 ML: 350 INJECTION, SOLUTION INTRAVENOUS at 11:07

## 2022-07-28 RX ADMIN — CLINDAMYCIN IN 5 PERCENT DEXTROSE 600 MG: 12 INJECTION, SOLUTION INTRAVENOUS at 09:07

## 2022-07-28 RX ADMIN — MORPHINE SULFATE 4 MG: 4 INJECTION INTRAVENOUS at 11:07

## 2022-07-28 RX ADMIN — MORPHINE SULFATE 4 MG: 4 INJECTION INTRAVENOUS at 09:07

## 2022-07-28 RX ADMIN — SODIUM CHLORIDE 1000 ML: 0.9 INJECTION, SOLUTION INTRAVENOUS at 03:07

## 2022-07-28 NOTE — ED PROVIDER NOTES
SCRIBE #1 NOTE: Didi PANTOJA am scribing for, and in the presence of, Willis Purvis MD. I have scribed the HPI, ROS, and PEx.     SCRIBE #2 NOTE: Tj PANTOJA , am scribing for, and in the presence of,  Juvenal Gonzalez MD. I have scribed the remaining portions of the note not scribed by Scribe #1.     SCRIBE #3 NOTE: Didi PANTOJA am scribing for, and in the presence of, Willis Purvis MD. I have scribed the remaining portions of the note not scribed by Scribe #2.     History      Chief Complaint   Patient presents with    Abscess     L side of face x 1 week, finished oral antibiotics swelling and pain have worsened and now has trouble swallowing and ear pain        Review of patient's allergies indicates:  No Known Allergies     HPI   HPI    2022, 9:40 AM   History obtained from the patient      History of Present Illness: Latonya Blevins is a 51 y.o. female patient with a PMHx of HTN who presents to the Emergency Department for L-sided facial swelling which onset gradually approximately 2 weeks PTA. Pt was evaluated for her L sided facial swelling and was prescribed oral abx on 2022. Pt reports that she finished her oral abx, but her facial swelling has worsened. Symptoms are constant and moderate in severity. No mitigating or exacerbating factors reported. Associated sxs include trouble swallowing and L ear pain. Patient denies any fever, chills, N/V/D, SOB, CP, weakness, numbness, and all other sxs at this time. No further complaints or concerns at this time.         Arrival mode: Personal vehicle    PCP: Anjelica Laguerre DO       Past Medical History:  Past Medical History:   Diagnosis Date    Hypertension        Past Surgical History:  Past Surgical History:   Procedure Laterality Date     SECTION  2008    DILATION AND CURETTAGE OF UTERUS  2018    sab;          Family History:  Family History   Problem Relation Age of Onset    Brain cancer Mother      Breast cancer Mother 62    Lung cancer Father     Brain cancer Father     Breast cancer Maternal Aunt     Breast cancer Paternal Aunt        Social History:  Social History     Tobacco Use    Smoking status: Current Every Day Smoker     Packs/day: 0.50     Types: Cigarettes     Start date: 1993    Smokeless tobacco: Never Used   Substance and Sexual Activity    Alcohol use: No    Drug use: No    Sexual activity: Yes     Partners: Male       ROS   Review of Systems   Constitutional: Negative for chills and fever.   HENT: Positive for ear pain (L), facial swelling (L-sided) and trouble swallowing. Negative for sore throat.    Respiratory: Negative for shortness of breath.    Cardiovascular: Negative for chest pain.   Gastrointestinal: Negative for diarrhea, nausea and vomiting.   Genitourinary: Negative for dysuria.   Musculoskeletal: Negative for back pain.   Skin: Negative for rash.   Neurological: Negative for weakness and numbness.   Hematological: Does not bruise/bleed easily.   All other systems reviewed and are negative.    Physical Exam      Initial Vitals [07/28/22 0931]   BP Pulse Resp Temp SpO2   (!) 156/91 80 18 99.5 °F (37.5 °C) 100 %      MAP       --          Physical Exam  Nursing Notes and Vital Signs Reviewed.  Constitutional: Patient is in no acute distress. Well-developed and well-nourished.  Head: There is significant swelling to the L submandibular jaw region. Atraumatic. Normocephalic.  Eyes: PERRL. EOM intact. Conjunctivae are not pale. No scleral icterus.  ENT: Mucous membranes are moist. Oropharynx is clear and symmetric.    Neck: Supple. Full ROM. No lymphadenopathy.  Cardiovascular: Regular rate. Regular rhythm. No murmurs, rubs, or gallops. Distal pulses are 2+ and symmetric.  Pulmonary/Chest: No respiratory distress. Clear to auscultation bilaterally. No wheezing or rales.  Abdominal: Soft and non-distended.  There is no tenderness.  No rebound, guarding, or rigidity.    Musculoskeletal: Moves all extremities. No obvious deformities. No edema.  Skin: Warm and dry.  Neurological:  Alert, awake, and appropriate.  Normal speech.  No acute focal neurological deficits are appreciated.  Psychiatric: Normal affect. Good eye contact. Appropriate in content.    ED Course    Procedures  ED Vital Signs:  Vitals:    07/28/22 2100 07/28/22 2127 07/28/22 2150 07/28/22 2200   BP: (!) 178/88   (!) 157/80   Pulse:  98     Resp:   20    Temp:       TempSrc:       SpO2:       Weight:       Height:        07/28/22 2230 07/28/22 2300 07/28/22 2330 07/29/22 0024   BP: (!) 156/83 (!) 150/80 (!) 179/87 (!) 174/90   Pulse: 92  88 87   Resp: 20  (!) 23 17   Temp:       TempSrc:       SpO2: 95%  98% 100%   Weight:       Height:        07/29/22 0105 07/29/22 0130 07/29/22 0200 07/29/22 0523   BP: (!) 164/77 (!) 144/88 (!) 156/86 (!) 155/80   Pulse: 80 82 84 92   Resp: 19 20 20 20   Temp:   98.4 °F (36.9 °C)    TempSrc:   Oral    SpO2: 97% 96% 96% 97%   Weight:       Height:        07/29/22 0701 07/29/22 1107 07/29/22 1245   BP: (!) 146/79 (!) 167/102 (!) 176/95   Pulse: 88 78 77   Resp: 20 16 16   Temp:  (!) 102 °F (38.9 °C) 99.4 °F (37.4 °C)   TempSrc:  Oral Oral   SpO2: 97% 98% 97%   Weight:      Height:          Abnormal Lab Results:  Labs Reviewed   COMPREHENSIVE METABOLIC PANEL - Abnormal; Notable for the following components:       Result Value    CO2 22 (*)     Albumin 3.4 (*)     All other components within normal limits   CBC W/ AUTO DIFFERENTIAL - Abnormal; Notable for the following components:    WBC 13.34 (*)     Hemoglobin 11.5 (*)     Hematocrit 34.8 (*)     Gran # (ANC) 10.4 (*)     Immature Grans (Abs) 0.06 (*)     Mono # 1.2 (*)     Gran % 78.2 (*)     Lymph % 11.2 (*)     All other components within normal limits   CULTURE, BLOOD   CULTURE, BLOOD   POCT GLUCOSE        All Lab Results:  Results for orders placed or performed during the hospital encounter of 07/28/22   Blood culture     Specimen: Peripheral, Hand, Left; Blood   Result Value Ref Range    Blood Culture, Routine No Growth to date    Blood culture    Specimen: Peripheral, Antecubital, Right; Blood   Result Value Ref Range    Blood Culture, Routine No Growth to date    Comprehensive Metabolic Panel   Result Value Ref Range    Sodium 141 136 - 145 mmol/L    Potassium 4.2 3.5 - 5.1 mmol/L    Chloride 105 95 - 110 mmol/L    CO2 22 (L) 23 - 29 mmol/L    Glucose 108 70 - 110 mg/dL    BUN 9 6 - 20 mg/dL    Creatinine 0.8 0.5 - 1.4 mg/dL    Calcium 9.3 8.7 - 10.5 mg/dL    Total Protein 6.7 6.0 - 8.4 g/dL    Albumin 3.4 (L) 3.5 - 5.2 g/dL    Total Bilirubin 0.2 0.1 - 1.0 mg/dL    Alkaline Phosphatase 81 55 - 135 U/L    AST 13 10 - 40 U/L    ALT 14 10 - 44 U/L    Anion Gap 14 8 - 16 mmol/L    eGFR if African American >60 >60 mL/min/1.73 m^2    eGFR if non African American >60 >60 mL/min/1.73 m^2   CBC Auto Differential   Result Value Ref Range    WBC 13.34 (H) 3.90 - 12.70 K/uL    RBC 4.04 4.00 - 5.40 M/uL    Hemoglobin 11.5 (L) 12.0 - 16.0 g/dL    Hematocrit 34.8 (L) 37.0 - 48.5 %    MCV 86 82 - 98 fL    MCH 28.5 27.0 - 31.0 pg    MCHC 33.0 32.0 - 36.0 g/dL    RDW 14.5 11.5 - 14.5 %    Platelets 321 150 - 450 K/uL    MPV 11.1 9.2 - 12.9 fL    Immature Granulocytes 0.4 0.0 - 0.5 %    Gran # (ANC) 10.4 (H) 1.8 - 7.7 K/uL    Immature Grans (Abs) 0.06 (H) 0.00 - 0.04 K/uL    Lymph # 1.5 1.0 - 4.8 K/uL    Mono # 1.2 (H) 0.3 - 1.0 K/uL    Eos # 0.1 0.0 - 0.5 K/uL    Baso # 0.06 0.00 - 0.20 K/uL    nRBC 0 0 /100 WBC    Gran % 78.2 (H) 38.0 - 73.0 %    Lymph % 11.2 (L) 18.0 - 48.0 %    Mono % 9.1 4.0 - 15.0 %    Eosinophil % 0.7 0.0 - 8.0 %    Basophil % 0.4 0.0 - 1.9 %    Ovalocytes Occasional     Differential Method Automated    POCT urine pregnancy   Result Value Ref Range    POC Preg Test, Ur Negative Negative     Acceptable Yes    SARS Coronavirus 2 Antigen, POCT Manual Read   Result Value Ref Range    SARS Coronavirus 2 Antigen  Negative Negative     Acceptable Yes    POCT glucose   Result Value Ref Range    POCT Glucose 106 70 - 110 mg/dL         Imaging Results:  Imaging Results          CT Soft Tissue Neck With Contrast (Final result)  Result time 07/28/22 11:43:59    Final result by Bridger Montanez MD (07/28/22 11:43:59)                 Impression:      1.  Findings most consistent with dentigerous abscess formation involving the inferior portions of the left mandible (measuring 2.5 x 1.4 x 1.1 cm) with extensions into the floor of the mouth (measuring 9 mm in greatest length) and into the left masseter muscle (measuring 2.7 x 1.2 x 1.1 cm).  There is a significant amount of induration of the adjacent superficial and deep soft tissues involving the left jaw, left masseter muscle and floor of the mouth with probable reactive edema and enlargement of the left submandibular gland.  There also reactive lymph nodes within the left neck and left submental region.    2.  There is an apical abscess involving the posterior most left mandibular tooth with a breach in the cortex medially.    3.  Lobular enlargement of the left thyroid lobe with a possible 2.2 cm solid nodule.  Follow-up nonemergent thyroid ultrasound recommended after the patient's acute symptomatology has resolved.    4.  Negative for acute process otherwise.  Nonemergent findings as described above.    All CT scans at this facility are performed  using dose modulation techniques as appropriate to performed exam including the following:  automated exposure control; adjustment of mA and/or kV according to the patients size (this includes techniques or standardized protocols for targeted exams where dose is matched to indication/reason for exam: i.e. extremities or head);  iterative reconstruction technique.      Electronically signed by: Bridger Montanez MD  Date:    07/28/2022  Time:    11:43             Narrative:    EXAMINATION:  CT SOFT TISSUE NECK WITH  CONTRAST    CLINICAL HISTORY:  Neck abscess, deep tissue;    TECHNIQUE:  Axial images through the neck were obtained with IV contrast.  Sagittal and coronal reconstructions are provided for review.    COMPARISON:  No comparison studies are available.    FINDINGS:  There are multiple rim enhancing fluid collections within the left jaw region, associated with an apical abscess of the posterior most left mandibular tooth that has breached the cortex medially.  The largest collection of fluid is inferior to the left mandible measuring 2.5 x 1.4 x 1.1 cm (reference image 40 of series 601 and image 66 of series 2).  There is a small fluid collection within the floor of the mouth, medial to the larger collection measuring 9 mm on image 64 series 2.  The larger fluid collection appears to extend into the left masseter muscle, where there is a 2.7 x 1.2 x 1.1 cm rim enhancing collection.  No other abnormal fluid collections are seen.  There is significant edema within the soft tissues surrounding these fluid collections, extending into the superficial and deep soft tissues of the lateral jaw, as well as the floor of the mouth.  There appears to be swelling of the adjacent left submandibular gland as well.  There are borderline enlarged left neck and left  submental lymph nodes adjacent to these inflammatory changes, most likely reactive.    The parapharyngeal space, retropharyngeal space and prevertebral space are normal.  The parotid, right submandibular and right thyroid lobe are normal.  There is lobular enlargement of the left thyroid gland with a 2.2 cm solid nodule seen.    The airways are patent.  The visualized portions of the paranasal sinuses, mastoid air cells, middle ears and ear canals are clear.  Mild rightward nasal septal deviation.    Visualized portions of the brain and posterior fossa are normal.  Visualized portions of the orbits and globes are intact.    The lung apices are clear.  The superior  mediastinal structures are normal.    Multilevel marginal spondylosis of the mid cervical spine.  Degenerative changes between the anterior arch of C1 and the dens.  Degenerative facet arthropathy.  The thecal sac is patent.                                        The Emergency Provider reviewed the vital signs and test results, which are outlined above.    ED Discussion     12:02 PM: Discussed pt's case with Dr. Mesa (Otolaryngology) who recommends transferring the pt to a facility with oral surgery services.    4:00 PM: Dr. Purvis transfers care of patient to Dr. Gonzalez pending lab results.    1:52 AM: Dr. Gonzalez transfers care of patient to Dr. Moreno pending lab results.    6:00 AM: Dr. Moreno transfers care of patient to Dr. Purvis pending transfer orders.     8:03 AM: Discussed pt's case with Dr. Serrato (Otolaryngology) who recommends continuing to find a facility with oral surgery services to transfer the pt to, but if efforts are unsuccessful, he will see the pt after clinic and preform an I&D this evening hopefully. Dr. Serrato recommends making the pt NPO at this time.    14:05 PM: Pt is being brought to the OR at this time so that Dr. Serrato (Otolaryngology) can perform an I&D.           ED Medication(s):  Medications   clindamycin in D5W 600 mg/50 mL IVPB 600 mg (0 mg Intravenous Stopped 7/29/22 0938)   HYDROmorphone injection 1 mg (has no administration in time range)   losartan tablet 50 mg (50 mg Oral Given 7/29/22 1120)   sodium chloride 0.9% flush 10 mL (has no administration in time range)   sodium chloride 0.9% flush 10 mL (has no administration in time range)   clindamycin in D5W 900 mg/50 mL IVPB 900 mg (0 mg Intravenous Stopped 7/28/22 1115)   iohexoL (OMNIPAQUE 350) injection 75 mL (75 mLs Intravenous Given 7/28/22 1126)   morphine injection 4 mg (4 mg Intravenous Given 7/28/22 1159)   0.9%  NaCl infusion (0 mLs Intravenous Stopped 7/29/22 0024)   promethazine (PHENERGAN) 12.5 mg in dextrose 5  % 50 mL IVPB (0 mg Intravenous Stopped 7/28/22 1733)   amLODIPine tablet 5 mg (5 mg Oral Given 7/28/22 2150)   morphine injection 4 mg (4 mg Intravenous Given 7/28/22 2150)   ondansetron injection 4 mg (4 mg Intravenous Given 7/29/22 0226)   0.9%  NaCl infusion (1,000 mLs Intravenous New Bag 7/29/22 0825)   ketorolac injection 15 mg (15 mg Intravenous Given 7/29/22 1120)           Current Discharge Medication List            Medical Decision Making    Medical Decision Making:   Clinical Tests:   Lab Tests: Ordered and Reviewed  Radiological Study: Ordered and Reviewed           Scribe Attestation:   Scribe #1: I performed the above scribed service and the documentation accurately describes the services I performed. I attest to the accuracy of the note.    Attending:   Physician Attestation Statement for Scribe #1: I, Willis Purvis MD, personally performed the services described in this documentation, as scribed by Didi Rider, in my presence, and it is both accurate and complete.       Scribe Attestation:   Scribe #2: I performed the above scribed service and the documentation accurately describes the services I performed. I attest to the accuracy of the note.  Scribe #3: I performed the above scribed service and the documentation accurately describes the services I performed. I attest to the accuracy of the note.    Attending Attestation:           Physician Attestation for Scribe:    Physician Attestation Statement for Scribe #2: I, Juvenal Gonzalez MD, reviewed documentation, as scribed by Tj Barron in my presence, and it is both accurate and complete. I also acknowledge and confirm the content of the note done by Scribe #1.        Physician Attestation for Scribe:     Physician Attestation Statement for Scribe #3: I, Willis Purvis MD, reviewed documentation as scribed by Didi Rider in my presence, and it is both accurate and complete. I also acknowledge and confirm the content of the note  done by Scribe #2.     Clinical Impression       ICD-10-CM ICD-9-CM   1. Dental abscess  K04.7 522.5   2. Abscess of parotid masseteric region of face  L02.01 682.0       Disposition:   [facility] ED DISP GEN - Choice: [facility] OR.  Condition: Fair         Willis Purvis MD  07/29/22 6886

## 2022-07-28 NOTE — PHARMACY MED REC
"Admission Medication History     The home medication history was taken by Rangel Saldivar.    You may go to "Admission" then "Reconcile Home Medications" tabs to review and/or act upon these items.      The home medication list has been updated by the Pharmacy department.    Please read ALL comments highlighted in yellow.    Please address this information as you see fit.     Feel free to contact us if you have any questions or require assistance.      The medications listed below were removed from the home medication list. Please reorder if appropriate:  Patient reports no longer taking the following medication(s):   ACETAMINOPHEN W/ CODEINE #3 300-30 MG PER TABLET   AMLODIPINE-ATORVASTATIN 10-20 MG PER TABLET (taking amlodipine 5 mg)   AMOXICILLIN-CLAVULANATE 875-125 MG PER TABLET (therapy completed)   CYCLOBENZAPRINE 10 MG TABLET   HYDROCHLOROTHIAZIDE 25 MG TABLET   LOSARTAN 25 MG TABLET   LOSARTAN 100 MG TABLET (taking losartan 50 mg)   MUPIROCIN 2 % TOPICAL OINTMENT   NAPROXEN 500 MG TABLET    Medications listed below were obtained from: Patient/family, [employer] and Patient's pharmacy  (Not in a hospital admission)      Potential issues to be addressed PRIOR TO DISCHARGE: NONE    Rangel Saldivar [facility]  Pharmacy Technician Specialist-Medication History  [facility] [phone]  Secure chat preferred       Current Outpatient Medications on File Prior to Encounter   Medication Sig Dispense Refill Last Dose    amLODIPine (NORVASC) 5 MG tablet Take 5 mg by mouth once daily.   7/27/2022 at Unknown time    aspirin-acetaminophen-caffeine 250-250-65 mg (EXCEDRIN EXTRA STRENGTH) 250-250-65 mg per tablet Take 2 tablets by mouth every 6 (six) hours as needed for Pain.   7/28/2022 at Unknown time    ergocalciferol (ERGOCALCIFEROL) 50,000 unit Cap Take 50,000 Units by mouth every 7 days.   Past Week at Monday, 07/25/2022    losartan (COZAAR) 50 MG tablet Take 50 mg by mouth once daily.   Past " Week at Weekend some time.    HYDROcodone-acetaminophen (NORCO) 7.5-325 mg per tablet Take 1 tablet by mouth every 4 (four) hours as needed for Pain. 10 tablet 0 Unknown at Unknown time    [DISCONTINUED] amlodipine-atorvastatin (CADUET) 10-20 mg per tablet Take 1 tablet by mouth once daily.       [DISCONTINUED] cyclobenzaprine (FLEXERIL) 10 MG tablet Take 0.5 tablets (5 mg total) by mouth 3 (three) times daily as needed for Muscle spasms. 15 tablet 0     [DISCONTINUED] hydroCHLOROthiazide (HYDRODIURIL) 25 MG tablet Take 1 tablet (25 mg total) by mouth once daily. (Patient not taking: Reported on 12/3/2020) 30 tablet 3     [DISCONTINUED] losartan (COZAAR) 100 MG tablet TK 1 T PO QD       [DISCONTINUED] losartan (COZAAR) 25 MG tablet Take 25 mg by mouth once daily.       [DISCONTINUED] mupirocin (BACTROBAN) 2 % ointment REA SML AMT EXT AA BID       [DISCONTINUED] naproxen (NAPROSYN) 500 MG tablet TK 1 T PO BID AFTER MEALS                                  .

## 2022-07-28 NOTE — ED NOTES
The transfer center called to state that [facility] and [facility] are on complete diversion.  ED physician and primary RN were notified via secure chat.

## 2022-07-29 ENCOUNTER — ANESTHESIA EVENT (OUTPATIENT)
Dept: SURGERY | Facility: HOSPITAL | Age: 52
End: 2022-07-29
Payer: COMMERCIAL

## 2022-07-29 ENCOUNTER — ANESTHESIA (OUTPATIENT)
Dept: SURGERY | Facility: HOSPITAL | Age: 52
End: 2022-07-29
Payer: COMMERCIAL

## 2022-07-29 LAB
B-HCG UR QL: NEGATIVE
CTP QC/QA: YES
CTP QC/QA: YES
POCT GLUCOSE: 106 MG/DL (ref 70–110)
SARS-COV-2 AG RESP QL IA.RAPID: NEGATIVE

## 2022-07-29 PROCEDURE — 25000003 PHARM REV CODE 250: Performed by: NURSE ANESTHETIST, CERTIFIED REGISTERED

## 2022-07-29 PROCEDURE — 71000033 HC RECOVERY, INTIAL HOUR: Performed by: STUDENT IN AN ORGANIZED HEALTH CARE EDUCATION/TRAINING PROGRAM

## 2022-07-29 PROCEDURE — 99204 PR OFFICE/OUTPT VISIT, NEW, LEVL IV, 45-59 MIN: ICD-10-PCS | Mod: 25,,, | Performed by: STUDENT IN AN ORGANIZED HEALTH CARE EDUCATION/TRAINING PROGRAM

## 2022-07-29 PROCEDURE — 81025 URINE PREGNANCY TEST: CPT | Performed by: STUDENT IN AN ORGANIZED HEALTH CARE EDUCATION/TRAINING PROGRAM

## 2022-07-29 PROCEDURE — 41018 PR I&D MOUTH/TONG EXTRA,MASTICATOR: ICD-10-PCS | Mod: ,,, | Performed by: STUDENT IN AN ORGANIZED HEALTH CARE EDUCATION/TRAINING PROGRAM

## 2022-07-29 PROCEDURE — 63600175 PHARM REV CODE 636 W HCPCS: Performed by: NURSE PRACTITIONER

## 2022-07-29 PROCEDURE — 87070 CULTURE OTHR SPECIMN AEROBIC: CPT | Performed by: STUDENT IN AN ORGANIZED HEALTH CARE EDUCATION/TRAINING PROGRAM

## 2022-07-29 PROCEDURE — 25000003 PHARM REV CODE 250: Performed by: STUDENT IN AN ORGANIZED HEALTH CARE EDUCATION/TRAINING PROGRAM

## 2022-07-29 PROCEDURE — 63600175 PHARM REV CODE 636 W HCPCS: Performed by: EMERGENCY MEDICINE

## 2022-07-29 PROCEDURE — 87075 CULTR BACTERIA EXCEPT BLOOD: CPT | Performed by: STUDENT IN AN ORGANIZED HEALTH CARE EDUCATION/TRAINING PROGRAM

## 2022-07-29 PROCEDURE — 37000009 HC ANESTHESIA EA ADD 15 MINS: Performed by: STUDENT IN AN ORGANIZED HEALTH CARE EDUCATION/TRAINING PROGRAM

## 2022-07-29 PROCEDURE — 25000003 PHARM REV CODE 250: Performed by: EMERGENCY MEDICINE

## 2022-07-29 PROCEDURE — 37000008 HC ANESTHESIA 1ST 15 MINUTES: Performed by: STUDENT IN AN ORGANIZED HEALTH CARE EDUCATION/TRAINING PROGRAM

## 2022-07-29 PROCEDURE — 36000704 HC OR TIME LEV I 1ST 15 MIN: Performed by: STUDENT IN AN ORGANIZED HEALTH CARE EDUCATION/TRAINING PROGRAM

## 2022-07-29 PROCEDURE — 63600175 PHARM REV CODE 636 W HCPCS: Performed by: ANESTHESIOLOGY

## 2022-07-29 PROCEDURE — C1729 CATH, DRAINAGE: HCPCS | Performed by: STUDENT IN AN ORGANIZED HEALTH CARE EDUCATION/TRAINING PROGRAM

## 2022-07-29 PROCEDURE — 36000705 HC OR TIME LEV I EA ADD 15 MIN: Performed by: STUDENT IN AN ORGANIZED HEALTH CARE EDUCATION/TRAINING PROGRAM

## 2022-07-29 PROCEDURE — 41018 DRAINAGE OF MOUTH LESION: CPT | Mod: ,,, | Performed by: STUDENT IN AN ORGANIZED HEALTH CARE EDUCATION/TRAINING PROGRAM

## 2022-07-29 PROCEDURE — 71000039 HC RECOVERY, EACH ADD'L HOUR: Performed by: STUDENT IN AN ORGANIZED HEALTH CARE EDUCATION/TRAINING PROGRAM

## 2022-07-29 PROCEDURE — 63600175 PHARM REV CODE 636 W HCPCS: Performed by: STUDENT IN AN ORGANIZED HEALTH CARE EDUCATION/TRAINING PROGRAM

## 2022-07-29 PROCEDURE — 63600175 PHARM REV CODE 636 W HCPCS: Performed by: NURSE ANESTHETIST, CERTIFIED REGISTERED

## 2022-07-29 PROCEDURE — 99204 OFFICE O/P NEW MOD 45 MIN: CPT | Mod: 25,,, | Performed by: STUDENT IN AN ORGANIZED HEALTH CARE EDUCATION/TRAINING PROGRAM

## 2022-07-29 PROCEDURE — 87076 CULTURE ANAEROBE IDENT EACH: CPT | Performed by: STUDENT IN AN ORGANIZED HEALTH CARE EDUCATION/TRAINING PROGRAM

## 2022-07-29 RX ORDER — ACETAMINOPHEN 325 MG/1
650 TABLET ORAL EVERY 6 HOURS PRN
Status: DISCONTINUED | OUTPATIENT
Start: 2022-07-29 | End: 2022-07-31 | Stop reason: HOSPADM

## 2022-07-29 RX ORDER — LIDOCAINE HYDROCHLORIDE 20 MG/ML
INJECTION INTRAVENOUS
Status: DISCONTINUED | OUTPATIENT
Start: 2022-07-29 | End: 2022-07-29

## 2022-07-29 RX ORDER — LOSARTAN POTASSIUM 50 MG/1
50 TABLET ORAL DAILY
Status: DISCONTINUED | OUTPATIENT
Start: 2022-07-29 | End: 2022-07-31 | Stop reason: HOSPADM

## 2022-07-29 RX ORDER — SODIUM CHLORIDE 9 MG/ML
1000 INJECTION, SOLUTION INTRAVENOUS
Status: COMPLETED | OUTPATIENT
Start: 2022-07-29 | End: 2022-07-29

## 2022-07-29 RX ORDER — PROPOFOL 10 MG/ML
VIAL (ML) INTRAVENOUS
Status: DISCONTINUED | OUTPATIENT
Start: 2022-07-29 | End: 2022-07-29

## 2022-07-29 RX ORDER — FENTANYL CITRATE 50 UG/ML
INJECTION, SOLUTION INTRAMUSCULAR; INTRAVENOUS
Status: DISCONTINUED | OUTPATIENT
Start: 2022-07-29 | End: 2022-07-29

## 2022-07-29 RX ORDER — DEXMEDETOMIDINE HYDROCHLORIDE 100 UG/ML
INJECTION, SOLUTION INTRAVENOUS
Status: DISCONTINUED | OUTPATIENT
Start: 2022-07-29 | End: 2022-07-29

## 2022-07-29 RX ORDER — LOSARTAN POTASSIUM 50 MG/1
50 TABLET ORAL DAILY
Status: DISCONTINUED | OUTPATIENT
Start: 2022-07-30 | End: 2022-07-29

## 2022-07-29 RX ORDER — BACITRACIN ZINC 500 UNIT/G
OINTMENT (GRAM) TOPICAL 3 TIMES DAILY
Status: DISCONTINUED | OUTPATIENT
Start: 2022-07-29 | End: 2022-07-31 | Stop reason: HOSPADM

## 2022-07-29 RX ORDER — SODIUM CHLORIDE 0.9 % (FLUSH) 0.9 %
10 SYRINGE (ML) INJECTION
Status: DISCONTINUED | OUTPATIENT
Start: 2022-07-29 | End: 2022-07-31 | Stop reason: HOSPADM

## 2022-07-29 RX ORDER — HYDRALAZINE HYDROCHLORIDE 20 MG/ML
10 INJECTION INTRAMUSCULAR; INTRAVENOUS EVERY 8 HOURS PRN
Status: DISCONTINUED | OUTPATIENT
Start: 2022-07-29 | End: 2022-07-31 | Stop reason: HOSPADM

## 2022-07-29 RX ORDER — KETAMINE HYDROCHLORIDE 50 MG/ML
INJECTION, SOLUTION INTRAMUSCULAR; INTRAVENOUS
Status: DISCONTINUED | OUTPATIENT
Start: 2022-07-29 | End: 2022-07-29

## 2022-07-29 RX ORDER — HYDROMORPHONE HYDROCHLORIDE 1 MG/ML
1 INJECTION, SOLUTION INTRAMUSCULAR; INTRAVENOUS; SUBCUTANEOUS EVERY 6 HOURS PRN
Status: DISCONTINUED | OUTPATIENT
Start: 2022-07-29 | End: 2022-07-31 | Stop reason: HOSPADM

## 2022-07-29 RX ORDER — ONDANSETRON 2 MG/ML
INJECTION INTRAMUSCULAR; INTRAVENOUS
Status: DISCONTINUED | OUTPATIENT
Start: 2022-07-29 | End: 2022-07-29

## 2022-07-29 RX ORDER — DEXTROSE MONOHYDRATE, SODIUM CHLORIDE, AND POTASSIUM CHLORIDE 50; 1.49; 9 G/1000ML; G/1000ML; G/1000ML
INJECTION, SOLUTION INTRAVENOUS CONTINUOUS
Status: DISCONTINUED | OUTPATIENT
Start: 2022-07-29 | End: 2022-07-30

## 2022-07-29 RX ORDER — OXYCODONE AND ACETAMINOPHEN 5; 325 MG/1; MG/1
1 TABLET ORAL
Status: DISCONTINUED | OUTPATIENT
Start: 2022-07-29 | End: 2022-07-29 | Stop reason: HOSPADM

## 2022-07-29 RX ORDER — ONDANSETRON 2 MG/ML
4 INJECTION INTRAMUSCULAR; INTRAVENOUS EVERY 12 HOURS PRN
Status: DISCONTINUED | OUTPATIENT
Start: 2022-07-29 | End: 2022-07-31 | Stop reason: HOSPADM

## 2022-07-29 RX ORDER — KETOROLAC TROMETHAMINE 30 MG/ML
15 INJECTION, SOLUTION INTRAMUSCULAR; INTRAVENOUS
Status: COMPLETED | OUTPATIENT
Start: 2022-07-29 | End: 2022-07-29

## 2022-07-29 RX ORDER — MIDAZOLAM HYDROCHLORIDE 1 MG/ML
INJECTION, SOLUTION INTRAMUSCULAR; INTRAVENOUS
Status: DISCONTINUED | OUTPATIENT
Start: 2022-07-29 | End: 2022-07-29

## 2022-07-29 RX ORDER — IBUPROFEN 400 MG/1
400 TABLET ORAL EVERY 6 HOURS
Status: DISCONTINUED | OUTPATIENT
Start: 2022-07-30 | End: 2022-07-31 | Stop reason: HOSPADM

## 2022-07-29 RX ORDER — AMLODIPINE BESYLATE 5 MG/1
5 TABLET ORAL DAILY
Status: DISCONTINUED | OUTPATIENT
Start: 2022-07-30 | End: 2022-07-30

## 2022-07-29 RX ORDER — ONDANSETRON 2 MG/ML
4 INJECTION INTRAMUSCULAR; INTRAVENOUS ONCE
Status: COMPLETED | OUTPATIENT
Start: 2022-07-29 | End: 2022-07-29

## 2022-07-29 RX ORDER — DEXAMETHASONE SODIUM PHOSPHATE 4 MG/ML
INJECTION, SOLUTION INTRA-ARTICULAR; INTRALESIONAL; INTRAMUSCULAR; INTRAVENOUS; SOFT TISSUE
Status: DISCONTINUED | OUTPATIENT
Start: 2022-07-29 | End: 2022-07-29

## 2022-07-29 RX ORDER — HYDROMORPHONE HYDROCHLORIDE 2 MG/ML
0.2 INJECTION, SOLUTION INTRAMUSCULAR; INTRAVENOUS; SUBCUTANEOUS EVERY 5 MIN PRN
Status: DISCONTINUED | OUTPATIENT
Start: 2022-07-29 | End: 2022-07-29 | Stop reason: HOSPADM

## 2022-07-29 RX ORDER — PROCHLORPERAZINE EDISYLATE 5 MG/ML
5 INJECTION INTRAMUSCULAR; INTRAVENOUS EVERY 6 HOURS PRN
Status: DISCONTINUED | OUTPATIENT
Start: 2022-07-29 | End: 2022-07-31 | Stop reason: HOSPADM

## 2022-07-29 RX ORDER — HYDROCODONE BITARTRATE AND ACETAMINOPHEN 7.5; 325 MG/1; MG/1
1 TABLET ORAL EVERY 4 HOURS PRN
Status: DISCONTINUED | OUTPATIENT
Start: 2022-07-29 | End: 2022-07-31 | Stop reason: HOSPADM

## 2022-07-29 RX ADMIN — ONDANSETRON 4 MG: 2 INJECTION, SOLUTION INTRAMUSCULAR; INTRAVENOUS at 05:07

## 2022-07-29 RX ADMIN — ACETAMINOPHEN 650 MG: 325 TABLET ORAL at 09:07

## 2022-07-29 RX ADMIN — MIDAZOLAM 2 MG: 1 INJECTION INTRAMUSCULAR; INTRAVENOUS at 04:07

## 2022-07-29 RX ADMIN — DEXMEDETOMIDINE HYDROCHLORIDE 200 MCG: 100 INJECTION, SOLUTION, CONCENTRATE INTRAVENOUS at 04:07

## 2022-07-29 RX ADMIN — BACITRACIN ZINC: 500 OINTMENT TOPICAL at 09:07

## 2022-07-29 RX ADMIN — PROPOFOL 30 MG: 10 INJECTION, EMULSION INTRAVENOUS at 04:07

## 2022-07-29 RX ADMIN — DEXAMETHASONE SODIUM PHOSPHATE 8 MG: 4 INJECTION, SOLUTION INTRAMUSCULAR; INTRAVENOUS at 05:07

## 2022-07-29 RX ADMIN — DEXTROSE MONOHYDRATE, SODIUM CHLORIDE, AND POTASSIUM CHLORIDE: 50; 9; 1.49 INJECTION, SOLUTION INTRAVENOUS at 09:07

## 2022-07-29 RX ADMIN — ONDANSETRON 4 MG: 2 INJECTION INTRAMUSCULAR; INTRAVENOUS at 02:07

## 2022-07-29 RX ADMIN — SODIUM CHLORIDE 1000 ML: 0.9 INJECTION, SOLUTION INTRAVENOUS at 08:07

## 2022-07-29 RX ADMIN — LIDOCAINE HYDROCHLORIDE 60 MG: 20 INJECTION, SOLUTION INTRAVENOUS at 04:07

## 2022-07-29 RX ADMIN — CLINDAMYCIN IN 5 PERCENT DEXTROSE 600 MG: 12 INJECTION, SOLUTION INTRAVENOUS at 06:07

## 2022-07-29 RX ADMIN — HYDROCODONE BITARTRATE AND ACETAMINOPHEN 1 TABLET: 7.5; 325 TABLET ORAL at 09:07

## 2022-07-29 RX ADMIN — ONDANSETRON 4 MG: 2 INJECTION INTRAMUSCULAR; INTRAVENOUS at 11:07

## 2022-07-29 RX ADMIN — HYDRALAZINE HYDROCHLORIDE 10 MG: 20 INJECTION INTRAMUSCULAR; INTRAVENOUS at 09:07

## 2022-07-29 RX ADMIN — IBUPROFEN 400 MG: 400 TABLET, FILM COATED ORAL at 11:07

## 2022-07-29 RX ADMIN — KETAMINE HYDROCHLORIDE 30 MG: 50 INJECTION, SOLUTION, CONCENTRATE INTRAMUSCULAR; INTRAVENOUS at 04:07

## 2022-07-29 RX ADMIN — HYDROMORPHONE HYDROCHLORIDE 0.2 MG: 2 INJECTION INTRAMUSCULAR; INTRAVENOUS; SUBCUTANEOUS at 06:07

## 2022-07-29 RX ADMIN — LOSARTAN POTASSIUM 50 MG: 50 TABLET, FILM COATED ORAL at 11:07

## 2022-07-29 RX ADMIN — FENTANYL CITRATE 100 MCG: 50 INJECTION, SOLUTION INTRAMUSCULAR; INTRAVENOUS at 05:07

## 2022-07-29 RX ADMIN — CLINDAMYCIN IN 5 PERCENT DEXTROSE 600 MG: 12 INJECTION, SOLUTION INTRAVENOUS at 11:07

## 2022-07-29 RX ADMIN — KETOROLAC TROMETHAMINE 15 MG: 30 INJECTION, SOLUTION INTRAMUSCULAR; INTRAVENOUS at 11:07

## 2022-07-29 RX ADMIN — CLINDAMYCIN IN 5 PERCENT DEXTROSE 600 MG: 12 INJECTION, SOLUTION INTRAVENOUS at 05:07

## 2022-07-29 RX ADMIN — SODIUM CHLORIDE, SODIUM LACTATE, POTASSIUM CHLORIDE, AND CALCIUM CHLORIDE: .6; .31; .03; .02 INJECTION, SOLUTION INTRAVENOUS at 04:07

## 2022-07-29 NOTE — ED NOTES
Pt. To OR via stretcher, escorted by OR staff. Consent is signed for surgery. Daughter accompanying pt. All belongings in c/o daughter. Pt. Is AAOx4. NAD, VSS, resp. E/u.

## 2022-07-29 NOTE — TRANSFER OF CARE
"Anesthesia Transfer of Care Note    Patient: Latonya Blevins    Procedure(s) Performed: Procedure(s) (LRB):  INCISION AND DRAINAGE,NECK (Left)    Patient location: PACU    Anesthesia Type: general    Transport from OR: Transported from OR on room air with adequate spontaneous ventilation    Post pain: adequate analgesia    Post assessment: no apparent anesthetic complications and tolerated procedure well    Post vital signs: stable    Level of consciousness: awake, alert and oriented    Nausea/Vomiting: no nausea/vomiting    Complications: none    Transfer of care protocol was followed      Last vitals:   Visit Vitals  BP (!) 164/83   Pulse 77   Temp 36.5 °C (97.7 °F) (Temporal)   Resp 20   Ht 5' 2" (1.575 m)   Wt 101.2 kg (223 lb 1.7 oz)   SpO2 99%   Breastfeeding No   BMI 40.81 kg/m²     "

## 2022-07-29 NOTE — ANESTHESIA PROCEDURE NOTES
Intubation    Date/Time: 7/29/2022 4:48 PM  Performed by: Antonio Dorsey CRNA  Authorized by: Haroon Walter MD     Intubation:     Induction:  Intravenous    Intubated:  Preinduction-awake intubation    Mask Ventilation:  N/a    Attempts:  1    Attempted By:  Staff anesthesiologist    Method of Intubation:  Fiberoptic    Laryngeal View Grade: Grade I - full view of cords      Difficult Airway Encountered?: No      Complications:  None    Airway Device:  Nasal endotracheal tube    Airway Device Size:  7.0    Style/Cuff Inflation:  Cuffed    Tube secured:  27    Secured at:  [facility]    Placement Verified By:  Capnometry and Fiber optic visualization    Complicating Factors:  None    Findings Post-Intubation:  BS equal bilateral  Notes:      Awake nasal intubation

## 2022-07-29 NOTE — ED NOTES
Secure chat message sent to Dr. Purvis to notify: Temp - 102F; pt. asking for pain medication (in tears); also asking for losartan (50mg) - home med that she takes every morning. Awaiting response.

## 2022-07-29 NOTE — ANESTHESIA PREPROCEDURE EVALUATION
2022  Latonya Blevins is a 51 y.o., female.  Past Medical History:   Diagnosis Date    Hypertension        Past Surgical History:   Procedure Laterality Date     SECTION  2008    DILATION AND CURETTAGE OF UTERUS  2018    sab;        Family History   Problem Relation Age of Onset    Brain cancer Mother     Breast cancer Mother 62    Lung cancer Father     Brain cancer Father     Breast cancer Maternal Aunt     Breast cancer Paternal Aunt        Social History     Socioeconomic History    Marital status: Single   Tobacco Use    Smoking status: Current Every Day Smoker     Packs/day: 0.50     Types: Cigarettes     Start date:     Smokeless tobacco: Never Used   Substance and Sexual Activity    Alcohol use: No    Drug use: No    Sexual activity: Yes     Partners: Male       Current Facility-Administered Medications   Medication Dose Route Frequency Provider Last Rate Last Admin    clindamycin in D5W 600 mg/50 mL IVPB 600 mg  600 mg Intravenous Q8H Juvenal Gonzalez MD   Stopped at 22 0938    HYDROmorphone injection 1 mg  1 mg Intravenous Q6H PRN Willis Purvis MD        losartan tablet 50 mg  50 mg Oral Daily Willis Purvis MD   50 mg at 22 1120     Lab Results   Component Value Date    WBC 13.34 (H) 2022    HGB 11.5 (L) 2022    HCT 34.8 (L) 2022    MCV 86 2022     2022         Review of patient's allergies indicates:  No Known Allergies    Pre-op Assessment    I have reviewed the Patient Summary Reports.     I have reviewed the Nursing Notes. I have reviewed the NPO Status.      Review of Systems  Anesthesia Hx:  No problems with previous Anesthesia    Social:  Non-Smoker, No Alcohol Use    Hematology/Oncology:  Hematology Normal   Oncology Normal     EENT/Dental:EENT/Dental Normal   Cardiovascular:    Hypertension, well controlled    Pulmonary:  Pulmonary Normal    Renal/:  Renal/ Normal     Hepatic/GI:  Hepatic/GI Normal    Musculoskeletal:  Musculoskeletal Normal    Neurological:  Neurology Normal    Endocrine:  Endocrine Normal    Dermatological:  Skin Normal    Psych:  Psychiatric Normal           Physical Exam  General: Well nourished and Alert    Airway:  Mallampati: III / III  Mouth Opening: < 3 cm  TM Distance: > 6 cm  Tongue: Normal  Neck ROM: Normal ROM  Large left neck abscess. Severely limited mouth opening  Dental:  Intact  Large dental/neck abscess.      Anesthesia Plan  Type of Anesthesia, risks & benefits discussed:    Anesthesia Type: Gen ETT  Intra-op Monitoring Plan: Standard ASA Monitors  Post Op Pain Control Plan: IV/PO Opioids PRN  Induction:  IV  Airway Plan: Direct and Fiberoptic  Informed Consent: Informed consent signed with the Patient and all parties understand the risks and agree with anesthesia plan.  All questions answered.   ASA Score: 3    Ready For Surgery From Anesthesia Perspective.     .

## 2022-07-29 NOTE — ANESTHESIA POSTPROCEDURE EVALUATION
Anesthesia Post Evaluation    Patient: Latonya Blevins    Procedure(s) Performed: Procedure(s) (LRB):  INCISION AND DRAINAGE,NECK (Left)    Final Anesthesia Type: general      Patient location during evaluation: PACU  Patient participation: Yes- Able to Participate  Level of consciousness: awake and alert  Post-procedure vital signs: reviewed and stable  Pain management: adequate  Airway patency: patent    PONV status at discharge: No PONV  Anesthetic complications: no      Cardiovascular status: blood pressure returned to baseline  Respiratory status: unassisted  Hydration status: euvolemic  Follow-up not needed.          Vitals Value Taken Time   /84 07/29/22 1800   Temp 36.5 °C (97.7 °F) 07/29/22 1745   Pulse 74 07/29/22 1809   Resp 15 07/29/22 1809   SpO2 94 % 07/29/22 1809   Vitals shown include unvalidated device data.      No case tracking events are documented in the log.      Pain/Lila Score: Pain Rating Prior to Med Admin: 10 (7/29/2022 11:20 AM)  Lila Score: 9 (7/29/2022  6:00 PM)

## 2022-07-29 NOTE — OP NOTE
DATE OF PROCEDURE:  07/29/2022      PRE-OPERATIVE DIAGNOSIS:   Left mandibular and masseteric abscess      POST-OPERATIVE DIAGNOSIS:   same     PROCEDURE:   Incision and drainage of deep neck and  space abscesses      SURGEON:   Rigoberto Serrato MD     ANESTHESIA:   general     ESTIMATED BLOOD LOSS:    5 mL      SPECIMENS:   Left neck abscess for culture, aerobic and anaerobic      COMPLICATIONS:   None apparent      INDICATIONS:    Latonya Blevins is a 51 y.o. female with left neck abscess of odontogenic origion who presents today for incision and drainage. Risks, benefits, and expectations were thoroughly discussed and the patient/patient's family wished to proceed. Informed consent was obtained. All questions were answered.      OPERATIVE FINDINGS:   Purulent drainage from lingual and buccal surfaces of mandible      OPERATIVE DETAILS:   After informed consent was obtained, the patient was?taken to the operating room and placed in the supine position.??Once general anesthesia was induced, a time-out was performed and perioperative antibiotics and steroids were administered.she was then fiberoptically intubated successfully by the anesthesia team due to her trismus.     Incision was marked two fingerbeadths below the angle of the mandible over the area of fluctuance to protect the marginal mandibular nerve. Local anesthesia was injected. Incision was made with a 15 blade. Dissection was carried down through subcutaneous tissue and platysma to the deep neck with electrocautery. Blunt dissection was carried out with hemstats toward the angle of the mandible. Copious purulent was encountered. Blunt dissection was carried out along the inferior border, buccal, and lingual surfaces of the mandible. Dissection was also carried into the masseteric space as well. Once all pockets were opened, the pocket was copiously irrigated with saline until it returned clear.      A penrose drain was placed along the  inferior border of the mandible and secured with 2-0 silk. Skin was cleansed. A kerlix fluff was placed. All instrumentation was removed.     The patient was returned to the care of the Anesthesia, awakened, extubated, and transferred to the PACU in good condition.?

## 2022-07-29 NOTE — CONSULTS
Otolaryngology Consultation Note       Referring Physician: Dr. Willis Purvis MD     Patient Location at Initial Consult: ED    Chief Complaint/Reason for Consult: dental abscess       History of Presenting Illness:     Latonya Blevins is a  51 y.o. female presenting with left jaw swelling.       Onset about 10 days ago. Left jaw pain, swelling, left mandibular dental pain. Took round of abx without relief. Associated with trismus and left otalgia.     The patient denies neck mass, odynophagia, dysphagia, unusual bleeding, or unintentional weight loss.    Patient denies any fever, chills, N/V/D, SOB, CP, weakness, numbness, and all other sxs at this time. No further complaints or concerns at this time      Past Medical History:   Diagnosis Date    Hypertension              Past Surgical History:   Procedure Laterality Date     SECTION  2008    DILATION AND CURETTAGE OF UTERUS  2018    sab;           Family History   Problem Relation Age of Onset    Brain cancer Mother     Breast cancer Mother 62    Lung cancer Father     Brain cancer Father     Breast cancer Maternal Aunt     Breast cancer Paternal Aunt              Social History     Socioeconomic History    Marital status: Single   Tobacco Use    Smoking status: Current Every Day Smoker     Packs/day: 0.50     Types: Cigarettes     Start date:     Smokeless tobacco: Never Used   Substance and Sexual Activity    Alcohol use: No    Drug use: No    Sexual activity: Yes     Partners: Male             No current facility-administered medications on file prior to encounter.     Current Outpatient Medications on File Prior to Encounter   Medication Sig Dispense Refill    amLODIPine (NORVASC) 5 MG tablet Take 5 mg by mouth once daily.      aspirin-acetaminophen-caffeine 250-250-65 mg (EXCEDRIN EXTRA STRENGTH) 250-250-65 mg per tablet Take 2 tablets by mouth every 6 (six) hours as needed for Pain.      ergocalciferol  (ERGOCALCIFEROL) 50,000 unit Cap Take 50,000 Units by mouth every 7 days.      losartan (COZAAR) 50 MG tablet Take 50 mg by mouth once daily.      HYDROcodone-acetaminophen (NORCO) 7.5-325 mg per tablet Take 1 tablet by mouth every 4 (four) hours as needed for Pain. 10 tablet 0             Review of patient's allergies indicates:  No Known Allergies           Review of Systems:   Constitutional: Negative for chills and fever.   HENT: Positive for ear pain (L), facial swelling (L-sided) and trouble swallowing. Negative for sore throat.    Respiratory: Negative for shortness of breath.    Cardiovascular: Negative for chest pain.   Gastrointestinal: Negative for diarrhea, nausea and vomiting.   Genitourinary: Negative for dysuria.   Musculoskeletal: Negative for back pain.   Skin: Negative for rash.   Neurological: Negative for weakness and numbness.   Hematological: Does not bruise/bleed easily.   All other systems reviewed and are negative.          OBJECTIVE:     Vital Signs:     Vitals:    07/29/22 0701   BP: (!) 146/79   Pulse: 88   Resp: 20   Temp:           I&O       Intake/Output Summary (Last 24 hours) at 7/29/2022 1014  Last data filed at 7/28/2022 2237  Gross per 24 hour   Intake 50 ml   Output --   Net 50 ml          Physical Exam     General: No acute distress. Voice strong, no dysarthria or dysphonia     Head/Face: Normocephalic, atraumatic. No scars or lesions. Facial sensation intact bilaterally in distribution of V1-V3. Moderate trismus (1+ fingerbreadth)    Eyes: PERRL, EOMI     Ears: No gross deformity. Normal external canal.      Hearing: Normal speech reception.     Nose: No gross deformity or lesions. No purulent discharge.      Mouth/Oropharynx: Lips without any lesions. Dentition fair - no purulent intraoral drainage, no gingivobuccal fluctuance. No mucosal lesions within the oropharynx.      Neck: Trachea midline. No masses. No thyromegaly or nodules palpated. No crepitus. Left submandibular  region and masseteric swelling and erythema with blunting of angle of left mandible    Lymphatic: No lymphadenopathy in the neck.     Respiratory: No stridor or distress.     Cardiovascular: Regular rate and rhythm.     Extremities: No edema or cyanosis. Warm and well-perfused.     Skin: No scars or lesions on face or neck.     Neurologic: Moving all extremities without gross abnormality.     Review of Ancillary Data / Diagnostic Tests:     Labs:     Lab Results   Component Value Date    WBC 13.34 (H) 07/28/2022    HGB 11.5 (L) 07/28/2022    HCT 34.8 (L) 07/28/2022     07/28/2022    CHOL 153 03/19/2019    TRIG 90 03/19/2019    HDL 42 03/19/2019    ALT 14 07/28/2022    AST 13 07/28/2022     07/28/2022    K 4.2 07/28/2022     07/28/2022    CREATININE 0.8 07/28/2022    BUN 9 07/28/2022    CO2 22 (L) 07/28/2022    TSH 2.938 12/03/2020    GLUF 100 05/13/2019    HGBA1C 5.5 05/13/2019          Imaging  I have independently reviewed the following imaging with the findings noted below:      CT neck                   1.  Findings most consistent with dentigerous abscess formation involving the inferior portions of the left mandible (measuring 2.5 x 1.4 x 1.1 cm) with extensions into the floor of the mouth (measuring 9 mm in greatest length) and into the left masseter muscle (measuring 2.7 x 1.2 x 1.1 cm).  There is a significant amount of induration of the adjacent superficial and deep soft tissues involving the left jaw, left masseter muscle and floor of the mouth with probable reactive edema and enlargement of the left submandibular gland.  There also reactive lymph nodes within the left neck and left submental region.     2.  There is an apical abscess involving the posterior most left mandibular tooth with a breach in the cortex medially.     3.  Lobular enlargement of the left thyroid lobe with a possible 2.2 cm solid nodule.  Follow-up nonemergent thyroid ultrasound recommended after the patient's  acute symptomatology has resolved.     4.  Negative for acute process otherwise.  Nonemergent findings as described above.        ASSESSMENT:     51 y.o. female with odontogenic abscess of left mandible and masseter.        RECOMMENDATIONS:    Ideally, the drainage and extraction would take place simultaneously. However, she has been waiting nearly 24 hours at this point, with acceptable from an outside facility with OMFS coverage. it is in her best interest for neck I&D now with treatment of the molar on outpatient basis.     NPO  MIVF  IV abx per primary team  To OR for I&D this evening, risks and benefits discussed, consent obtained       Rigoberto Serrato MD  [facility]   [facility]  [id number] [place] [address].  T[place] [id number]  P: [phone]  F: [phone]

## 2022-07-29 NOTE — ED NOTES
Spoke with transfer center for acceptance update: [facility] and [facility] are the only facilities that have the service. both are still on diversion/saturation

## 2022-07-29 NOTE — ANESTHESIA POSTPROCEDURE EVALUATION
Anesthesia Post Evaluation    Patient: Latonya Blevins    Procedure(s) Performed: Procedure(s) (LRB):  INCISION AND DRAINAGE,NECK (Left)    [facility] Anesthesia Post Op Evaluation      Vitals Value Taken Time   /84 07/29/22 1800   Temp 36.5 °C (97.7 °F) 07/29/22 1745   Pulse 74 07/29/22 1808   Resp 22 07/29/22 1808   SpO2 96 % 07/29/22 1808   Vitals shown include unvalidated device data.      No case tracking events are documented in the log.      Pain/Lila Score: Pain Rating Prior to Med Admin: 10 (7/29/2022 11:20 AM)  Lila Score: 9 (7/29/2022  6:00 PM)

## 2022-07-30 PROBLEM — D64.9 ANEMIA: Status: ACTIVE | Noted: 2022-07-30

## 2022-07-30 LAB
ANION GAP SERPL CALC-SCNC: 8 MMOL/L (ref 8–16)
BASOPHILS # BLD AUTO: 0.04 K/UL (ref 0–0.2)
BASOPHILS NFR BLD: 0.2 % (ref 0–1.9)
BUN SERPL-MCNC: 13 MG/DL (ref 6–20)
CALCIUM SERPL-MCNC: 8.8 MG/DL (ref 8.7–10.5)
CHLORIDE SERPL-SCNC: 108 MMOL/L (ref 95–110)
CO2 SERPL-SCNC: 23 MMOL/L (ref 23–29)
CREAT SERPL-MCNC: 1.1 MG/DL (ref 0.5–1.4)
DIFFERENTIAL METHOD: ABNORMAL
EOSINOPHIL # BLD AUTO: 0 K/UL (ref 0–0.5)
EOSINOPHIL NFR BLD: 0 % (ref 0–8)
ERYTHROCYTE [DISTWIDTH] IN BLOOD BY AUTOMATED COUNT: 14.6 % (ref 11.5–14.5)
EST. GFR  (AFRICAN AMERICAN): >60 ML/MIN/1.73 M^2
EST. GFR  (NON AFRICAN AMERICAN): 59 ML/MIN/1.73 M^2
GLUCOSE SERPL-MCNC: 159 MG/DL (ref 70–110)
HCT VFR BLD AUTO: 33.8 % (ref 37–48.5)
HGB BLD-MCNC: 10.8 G/DL (ref 12–16)
IMM GRANULOCYTES # BLD AUTO: 0.09 K/UL (ref 0–0.04)
IMM GRANULOCYTES NFR BLD AUTO: 0.5 % (ref 0–0.5)
LYMPHOCYTES # BLD AUTO: 1.2 K/UL (ref 1–4.8)
LYMPHOCYTES NFR BLD: 7 % (ref 18–48)
MCH RBC QN AUTO: 27.8 PG (ref 27–31)
MCHC RBC AUTO-ENTMCNC: 32 G/DL (ref 32–36)
MCV RBC AUTO: 87 FL (ref 82–98)
MONOCYTES # BLD AUTO: 1.3 K/UL (ref 0.3–1)
MONOCYTES NFR BLD: 7.3 % (ref 4–15)
NEUTROPHILS # BLD AUTO: 15 K/UL (ref 1.8–7.7)
NEUTROPHILS NFR BLD: 85 % (ref 38–73)
NRBC BLD-RTO: 0 /100 WBC
PLATELET # BLD AUTO: 305 K/UL (ref 150–450)
PMV BLD AUTO: 11.3 FL (ref 9.2–12.9)
POTASSIUM SERPL-SCNC: 4.6 MMOL/L (ref 3.5–5.1)
RBC # BLD AUTO: 3.88 M/UL (ref 4–5.4)
SODIUM SERPL-SCNC: 139 MMOL/L (ref 136–145)
TROPONIN I SERPL DL<=0.01 NG/ML-MCNC: 0.01 NG/ML (ref 0–0.03)
TROPONIN I SERPL DL<=0.01 NG/ML-MCNC: 0.01 NG/ML (ref 0–0.03)
WBC # BLD AUTO: 17.64 K/UL (ref 3.9–12.7)

## 2022-07-30 PROCEDURE — 25000003 PHARM REV CODE 250: Performed by: STUDENT IN AN ORGANIZED HEALTH CARE EDUCATION/TRAINING PROGRAM

## 2022-07-30 PROCEDURE — 80048 BASIC METABOLIC PNL TOTAL CA: CPT | Performed by: INTERNAL MEDICINE

## 2022-07-30 PROCEDURE — 63600175 PHARM REV CODE 636 W HCPCS: Performed by: INTERNAL MEDICINE

## 2022-07-30 PROCEDURE — 93005 ELECTROCARDIOGRAM TRACING: CPT

## 2022-07-30 PROCEDURE — 63600175 PHARM REV CODE 636 W HCPCS: Performed by: NURSE PRACTITIONER

## 2022-07-30 PROCEDURE — 94761 N-INVAS EAR/PLS OXIMETRY MLT: CPT

## 2022-07-30 PROCEDURE — 99024 PR POST-OP FOLLOW-UP VISIT: ICD-10-PCS | Mod: ,,, | Performed by: STUDENT IN AN ORGANIZED HEALTH CARE EDUCATION/TRAINING PROGRAM

## 2022-07-30 PROCEDURE — 93010 EKG 12-LEAD: ICD-10-PCS | Mod: ,,, | Performed by: INTERNAL MEDICINE

## 2022-07-30 PROCEDURE — 25000003 PHARM REV CODE 250: Performed by: INTERNAL MEDICINE

## 2022-07-30 PROCEDURE — 99024 POSTOP FOLLOW-UP VISIT: CPT | Mod: ,,, | Performed by: STUDENT IN AN ORGANIZED HEALTH CARE EDUCATION/TRAINING PROGRAM

## 2022-07-30 PROCEDURE — 36415 COLL VENOUS BLD VENIPUNCTURE: CPT | Performed by: INTERNAL MEDICINE

## 2022-07-30 PROCEDURE — 84484 ASSAY OF TROPONIN QUANT: CPT | Performed by: INTERNAL MEDICINE

## 2022-07-30 PROCEDURE — 93010 ELECTROCARDIOGRAM REPORT: CPT | Mod: ,,, | Performed by: INTERNAL MEDICINE

## 2022-07-30 PROCEDURE — 85025 COMPLETE CBC W/AUTO DIFF WBC: CPT | Performed by: INTERNAL MEDICINE

## 2022-07-30 RX ORDER — ENOXAPARIN SODIUM 100 MG/ML
40 INJECTION SUBCUTANEOUS 2 TIMES DAILY
Status: DISCONTINUED | OUTPATIENT
Start: 2022-07-30 | End: 2022-07-31 | Stop reason: HOSPADM

## 2022-07-30 RX ORDER — CHLORHEXIDINE GLUCONATE ORAL RINSE 1.2 MG/ML
15 SOLUTION DENTAL
Status: DISCONTINUED | OUTPATIENT
Start: 2022-07-30 | End: 2022-07-31 | Stop reason: HOSPADM

## 2022-07-30 RX ORDER — SODIUM CHLORIDE 9 MG/ML
INJECTION, SOLUTION INTRAVENOUS CONTINUOUS
Status: ACTIVE | OUTPATIENT
Start: 2022-07-30 | End: 2022-07-31

## 2022-07-30 RX ORDER — AMLODIPINE BESYLATE 5 MG/1
5 TABLET ORAL 2 TIMES DAILY
Status: DISCONTINUED | OUTPATIENT
Start: 2022-07-30 | End: 2022-07-31 | Stop reason: HOSPADM

## 2022-07-30 RX ORDER — DEXAMETHASONE SODIUM PHOSPHATE 4 MG/ML
10 INJECTION, SOLUTION INTRA-ARTICULAR; INTRALESIONAL; INTRAMUSCULAR; INTRAVENOUS; SOFT TISSUE ONCE
Status: COMPLETED | OUTPATIENT
Start: 2022-07-30 | End: 2022-07-30

## 2022-07-30 RX ORDER — DOXYCYCLINE HYCLATE 100 MG
100 TABLET ORAL EVERY 12 HOURS
Status: DISCONTINUED | OUTPATIENT
Start: 2022-07-30 | End: 2022-07-31 | Stop reason: HOSPADM

## 2022-07-30 RX ADMIN — BACITRACIN ZINC: 500 OINTMENT TOPICAL at 10:07

## 2022-07-30 RX ADMIN — DOXYCYCLINE HYCLATE 100 MG: 100 TABLET, COATED ORAL at 02:07

## 2022-07-30 RX ADMIN — DEXAMETHASONE SODIUM PHOSPHATE 10 MG: 4 INJECTION INTRA-ARTICULAR; INTRALESIONAL; INTRAMUSCULAR; INTRAVENOUS; SOFT TISSUE at 05:07

## 2022-07-30 RX ADMIN — HYDROCODONE BITARTRATE AND ACETAMINOPHEN 1 TABLET: 7.5; 325 TABLET ORAL at 09:07

## 2022-07-30 RX ADMIN — DOXYCYCLINE HYCLATE 100 MG: 100 TABLET, COATED ORAL at 09:07

## 2022-07-30 RX ADMIN — ENOXAPARIN SODIUM 40 MG: 100 INJECTION SUBCUTANEOUS at 09:07

## 2022-07-30 RX ADMIN — LOSARTAN POTASSIUM 50 MG: 50 TABLET, FILM COATED ORAL at 09:07

## 2022-07-30 RX ADMIN — IBUPROFEN 400 MG: 400 TABLET, FILM COATED ORAL at 05:07

## 2022-07-30 RX ADMIN — DEXTROSE MONOHYDRATE, SODIUM CHLORIDE, AND POTASSIUM CHLORIDE: 50; 9; 1.49 INJECTION, SOLUTION INTRAVENOUS at 09:07

## 2022-07-30 RX ADMIN — AMLODIPINE BESYLATE 5 MG: 5 TABLET ORAL at 09:07

## 2022-07-30 RX ADMIN — SODIUM CHLORIDE 3 G: 9 INJECTION, SOLUTION INTRAVENOUS at 02:07

## 2022-07-30 RX ADMIN — CLINDAMYCIN IN 5 PERCENT DEXTROSE 600 MG: 12 INJECTION, SOLUTION INTRAVENOUS at 05:07

## 2022-07-30 RX ADMIN — CHLORHEXIDINE GLUCONATE 0.12% ORAL RINSE 15 ML: 1.2 LIQUID ORAL at 02:07

## 2022-07-30 RX ADMIN — BACITRACIN ZINC: 500 OINTMENT TOPICAL at 05:07

## 2022-07-30 RX ADMIN — BACITRACIN ZINC: 500 OINTMENT TOPICAL at 09:07

## 2022-07-30 RX ADMIN — SODIUM CHLORIDE 3 G: 9 INJECTION, SOLUTION INTRAVENOUS at 10:07

## 2022-07-30 RX ADMIN — SODIUM CHLORIDE: 0.9 INJECTION, SOLUTION INTRAVENOUS at 01:07

## 2022-07-30 RX ADMIN — HYDRALAZINE HYDROCHLORIDE 10 MG: 20 INJECTION INTRAMUSCULAR; INTRAVENOUS at 12:07

## 2022-07-30 RX ADMIN — IBUPROFEN 400 MG: 400 TABLET, FILM COATED ORAL at 12:07

## 2022-07-30 RX ADMIN — CHLORHEXIDINE GLUCONATE 0.12% ORAL RINSE 15 ML: 1.2 LIQUID ORAL at 09:07

## 2022-07-30 NOTE — SUBJECTIVE & OBJECTIVE
Past Medical History:   Diagnosis Date    Hypertension        Past Surgical History:   Procedure Laterality Date     SECTION  2008    DILATION AND CURETTAGE OF UTERUS  2018    sab;        Review of patient's allergies indicates:  No Known Allergies    No current facility-administered medications on file prior to encounter.     Current Outpatient Medications on File Prior to Encounter   Medication Sig    amLODIPine (NORVASC) 5 MG tablet Take 5 mg by mouth once daily.    aspirin-acetaminophen-caffeine 250-250-65 mg (EXCEDRIN EXTRA STRENGTH) 250-250-65 mg per tablet Take 2 tablets by mouth every 6 (six) hours as needed for Pain.    ergocalciferol (ERGOCALCIFEROL) 50,000 unit Cap Take 50,000 Units by mouth every 7 days.    losartan (COZAAR) 50 MG tablet Take 50 mg by mouth once daily.    HYDROcodone-acetaminophen (NORCO) 7.5-325 mg per tablet Take 1 tablet by mouth every 4 (four) hours as needed for Pain.     Family History       Problem Relation (Age of Onset)    Brain cancer Mother, Father    Breast cancer Mother (62), Maternal Aunt, Paternal Aunt    Lung cancer Father          Tobacco Use    Smoking status: Current Every Day Smoker     Packs/day: 0.50     Types: Cigarettes     Start date:     Smokeless tobacco: Never Used   Substance and Sexual Activity    Alcohol use: No    Drug use: No    Sexual activity: Yes     Partners: Male     Review of Systems   Constitutional: Negative.    HENT: Negative.     Eyes: Negative.    Respiratory: Negative.     Cardiovascular: Negative.    Gastrointestinal: Negative.    Endocrine: Negative.    Genitourinary: Negative.    Musculoskeletal: Negative.    Skin: Negative.    Allergic/Immunologic: Negative.    Neurological: Negative.    Hematological: Negative.    Psychiatric/Behavioral: Negative.     Objective:     Vital Signs (Most Recent):  Temp: 100.2 °F (37.9 °C) (22)  Pulse: 75 (22)  Resp: 19 (22)  BP: (!) 183/94  (07/29/22 2121)  SpO2: 95 % (07/29/22 2121)   Vital Signs (24h Range):  Temp:  [97.7 °F (36.5 °C)-102 °F (38.9 °C)] 100.2 °F (37.9 °C)  Pulse:  [73-92] 75  Resp:  [15-23] 19  SpO2:  [93 %-100 %] 95 %  BP: (144-187)/() 183/94     Weight: 101.2 kg (223 lb 1.7 oz)  Body mass index is 40.81 kg/m².    Physical Exam  Vitals and nursing note reviewed.   Constitutional:       Appearance: She is well-developed.   HENT:      Head: Normocephalic and atraumatic.   Eyes:      Conjunctiva/sclera: Conjunctivae normal.      Pupils: Pupils are equal, round, and reactive to light.   Neck:      Comments: Left neck with surgical incision with penrose drain inplace   Cardiovascular:      Rate and Rhythm: Normal rate and regular rhythm.      Heart sounds: Normal heart sounds.   Pulmonary:      Effort: Pulmonary effort is normal.      Breath sounds: Normal breath sounds.   Abdominal:      General: Bowel sounds are normal.      Palpations: Abdomen is soft.   Musculoskeletal:         General: Normal range of motion.      Cervical back: Normal range of motion and neck supple.   Skin:     General: Skin is warm and dry.   Neurological:      Mental Status: She is alert and oriented to person, place, and time.      Deep Tendon Reflexes: Reflexes are normal and symmetric.   Psychiatric:         Behavior: Behavior normal.         Thought Content: Thought content normal.         Judgment: Judgment normal.       Significant Labs: All pertinent labs within the past 24 hours have been reviewed.  BMP:   Recent Labs   Lab 07/28/22  1012         K 4.2      CO2 22*   BUN 9   CREATININE 0.8   CALCIUM 9.3     CBC:   Recent Labs   Lab 07/28/22  1012   WBC 13.34*   HGB 11.5*   HCT 34.8*        CMP:   Recent Labs   Lab 07/28/22  1012      K 4.2      CO2 22*      BUN 9   CREATININE 0.8   CALCIUM 9.3   PROT 6.7   ALBUMIN 3.4*   BILITOT 0.2   ALKPHOS 81   AST 13   ALT 14   ANIONGAP 14   EGFRNONAA >60     Recent  Lab Results         07/29/22  1444   07/29/22  0218        POCT Glucose   106       Preg Test, Ur Negative          Acceptable Yes          Yes         SARS Coronavirus 2 Antigen Negative                 Significant Imaging: I have reviewed all pertinent imaging results/findings within the past 24 hours.   steady

## 2022-07-30 NOTE — HPI
Latonya Blevins is a 50 y/o female with history of HTN admitted with odontogenic abscess of left mandible and masseter. Pt s/p incision and drainage per Dr. Serrato today. Pt tolerated procedure well. Hospital medicine has been consulted to assist with medical management.

## 2022-07-30 NOTE — ASSESSMENT & PLAN NOTE
S/p incision and drainage of left neck abscess per Dr. Serrato today  Managed per primary team   Pain control   IV clindamycin

## 2022-07-30 NOTE — CONSULTS
[facility]  Hospital Medicine  Consult Note    Patient Name: Latonya Blevins  MRN: [id number]  Admission Date: 2022  Hospital Length of Stay: 0 days  Attending Physician: No att. providers found   Primary Care Provider: Anjelica Laguerre DO           Patient information was obtained from patient and ER records.     Consults  Subjective:     Principal Problem: Abscess of parotid masseteric region of face    Chief Complaint:   Chief Complaint   Patient presents with    Abscess     L side of face x 1 week, finished oral antibiotics swelling and pain have worsened and now has trouble swallowing and ear pain         HPI: Latonya Blevins is a 50 y/o female with history of HTN admitted with odontogenic abscess of left mandible and masseter. Pt s/p incision and drainage per Dr. Serrato today. Pt tolerated procedure well. Hospital medicine has been consulted to assist with medical management.       Past Medical History:   Diagnosis Date    Hypertension        Past Surgical History:   Procedure Laterality Date     SECTION  2008    DILATION AND CURETTAGE OF UTERUS  2018    sab;        Review of patient's allergies indicates:  No Known Allergies    No current facility-administered medications on file prior to encounter.     Current Outpatient Medications on File Prior to Encounter   Medication Sig    amLODIPine (NORVASC) 5 MG tablet Take 5 mg by mouth once daily.    aspirin-acetaminophen-caffeine 250-250-65 mg (EXCEDRIN EXTRA STRENGTH) 250-250-65 mg per tablet Take 2 tablets by mouth every 6 (six) hours as needed for Pain.    ergocalciferol (ERGOCALCIFEROL) 50,000 unit Cap Take 50,000 Units by mouth every 7 days.    losartan (COZAAR) 50 MG tablet Take 50 mg by mouth once daily.    HYDROcodone-acetaminophen (NORCO) 7.5-325 mg per tablet Take 1 tablet by mouth every 4 (four) hours as needed for Pain.     Family History       Problem Relation (Age of Onset)    Brain cancer Mother, Father     Breast cancer Mother (62), Maternal Aunt, Paternal Aunt    Lung cancer Father          Tobacco Use    Smoking status: Current Every Day Smoker     Packs/day: 0.50     Types: Cigarettes     Start date: 1993    Smokeless tobacco: Never Used   Substance and Sexual Activity    Alcohol use: No    Drug use: No    Sexual activity: Yes     Partners: Male     Review of Systems   Constitutional: Negative.    HENT: Negative.     Eyes: Negative.    Respiratory: Negative.     Cardiovascular: Negative.    Gastrointestinal: Negative.    Endocrine: Negative.    Genitourinary: Negative.    Musculoskeletal: Negative.    Skin: Negative.    Allergic/Immunologic: Negative.    Neurological: Negative.    Hematological: Negative.    Psychiatric/Behavioral: Negative.     Objective:     Vital Signs (Most Recent):  Temp: 100.2 °F (37.9 °C) (07/29/22 2121)  Pulse: 75 (07/29/22 2121)  Resp: 19 (07/29/22 2121)  BP: (!) 183/94 (07/29/22 2121)  SpO2: 95 % (07/29/22 2121)   Vital Signs (24h Range):  Temp:  [97.7 °F (36.5 °C)-102 °F (38.9 °C)] 100.2 °F (37.9 °C)  Pulse:  [73-92] 75  Resp:  [15-23] 19  SpO2:  [93 %-100 %] 95 %  BP: (144-187)/() 183/94     Weight: 101.2 kg (223 lb 1.7 oz)  Body mass index is 40.81 kg/m².    Physical Exam  Vitals and nursing note reviewed.   Constitutional:       Appearance: She is well-developed.   HENT:      Head: Normocephalic and atraumatic.   Eyes:      Conjunctiva/sclera: Conjunctivae normal.      Pupils: Pupils are equal, round, and reactive to light.   Neck:      Comments: Left neck with surgical incision with penrose drain inplace   Cardiovascular:      Rate and Rhythm: Normal rate and regular rhythm.      Heart sounds: Normal heart sounds.   Pulmonary:      Effort: Pulmonary effort is normal.      Breath sounds: Normal breath sounds.   Abdominal:      General: Bowel sounds are normal.      Palpations: Abdomen is soft.   Musculoskeletal:         General: Normal range of motion.      Cervical  back: Normal range of motion and neck supple.   Skin:     General: Skin is warm and dry.   Neurological:      Mental Status: She is alert and oriented to person, place, and time.      Deep Tendon Reflexes: Reflexes are normal and symmetric.   Psychiatric:         Behavior: Behavior normal.         Thought Content: Thought content normal.         Judgment: Judgment normal.       Significant Labs: All pertinent labs within the past 24 hours have been reviewed.  BMP:   Recent Labs   Lab 07/28/22  1012         K 4.2      CO2 22*   BUN 9   CREATININE 0.8   CALCIUM 9.3     CBC:   Recent Labs   Lab 07/28/22  1012   WBC 13.34*   HGB 11.5*   HCT 34.8*        CMP:   Recent Labs   Lab 07/28/22  1012      K 4.2      CO2 22*      BUN 9   CREATININE 0.8   CALCIUM 9.3   PROT 6.7   ALBUMIN 3.4*   BILITOT 0.2   ALKPHOS 81   AST 13   ALT 14   ANIONGAP 14   EGFRNONAA >60     Recent Lab Results         07/29/22  1444   07/29/22  0218        POCT Glucose   106       Preg Test, Ur Negative          Acceptable Yes          Yes         SARS Coronavirus 2 Antigen Negative                 Significant Imaging: I have reviewed all pertinent imaging results/findings within the past 24 hours.    Assessment/Plan:     * Abscess of parotid masseteric region of face  S/p incision and drainage of left neck abscess per Dr. Serrato today  Managed per primary team   Pain control   IV clindamycin     Hypertension goal BP (blood pressure) < 140/90  Bp elevated likely situational   Resume amlodipine and losartan   IV hydralazine prn   Monitor       VTE Risk Mitigation (From admission, onward)         Ordered     Place sequential compression device  Until discontinued         07/29/22 4981                    Thank you for your consult. I will follow-up with patient. Please contact us if you have any additional questions.    Maria Elena Rider NP  Department of Hospital Medicine   [facility]  3

## 2022-07-30 NOTE — PROGRESS NOTES
[facility]  [facility] Medicine  Progress Note    Patient Name: Latonya Blevins  MRN: [id number]  Patient Class: OP- Outpatient Recovery   Admission Date: 7/28/2022  Length of Stay: 0 days  Attending Physician: Amish Ramachandran MD  Primary Care Provider: Anjelica Laguerre DO        Subjective:     Principal Problem:Abscess of  space of mouth        HPI:  Latonya Blevins is a 50 y/o female with history of HTN admitted with odontogenic abscess of left mandible and masseter. Pt s/p incision and drainage per Dr. Serrato today. Pt tolerated procedure well. Hospital medicine has been consulted to assist with medical management.       Overview/Hospital Course:  7/30- Tmax 100.2. BP elevated noted. POD #1, S/P Incision and drainage of deep neck and  space abscesses / Left mandibular and masseteric abscess. Abscess gram stain and cultures are pending. Blood cultures - NGTD. Source of infection probable oral or odontogenic. WBC hernandez to 17K>13.K. Will change antibiotic with activity against streptococci (including clindamycin-resistant viridans streptococci), H. Influenzae and anaerobic bacteria . Add Doxycycline to cover community acquired MRSA.       Interval History:   POD #1, S/P Incision and drainage of deep neck and  space abscesses / Left mandibular and masseteric abscess    Review of Systems   Constitutional:  Negative for activity change, appetite change and fever.   HENT:  Positive for facial swelling. Negative for sore throat.         Left jaw pain   Eyes:  Negative for visual disturbance.   Respiratory:  Negative for cough, chest tightness and shortness of breath.    Cardiovascular:  Negative for chest pain, palpitations and leg swelling.   Gastrointestinal:  Negative for abdominal distention, abdominal pain, constipation, diarrhea, nausea and vomiting.   Endocrine: Negative for polyuria.   Genitourinary:  Negative for decreased urine volume, dysuria, flank pain, frequency and  hematuria.   Musculoskeletal:  Negative for back pain and gait problem.   Skin:  Negative for rash.   Neurological:  Negative for syncope, speech difficulty, weakness, light-headedness and headaches.   Psychiatric/Behavioral:  Negative for confusion, hallucinations and sleep disturbance.    Objective:     Vital Signs (Most Recent):  Temp: 98.7 °F (37.1 °C) (07/30/22 1222)  Pulse: 74 (07/30/22 1222)  Resp: 20 (07/30/22 1222)  BP: (!) 182/98 (07/30/22 1222)  SpO2: 98 % (07/30/22 1222)   Vital Signs (24h Range):  Temp:  [97.7 °F (36.5 °C)-100.2 °F (37.9 °C)] 98.7 °F (37.1 °C)  Pulse:  [63-82] 74  Resp:  [15-22] 20  SpO2:  [93 %-100 %] 98 %  BP: (133-187)/(64-98) 182/98     Weight: 101.2 kg (223 lb 1.7 oz)  Body mass index is 40.81 kg/m².    Intake/Output Summary (Last 24 hours) at 7/30/2022 1320  Last data filed at 7/29/2022 2300  Gross per 24 hour   Intake 1090 ml   Output 10 ml   Net 1080 ml      Physical Exam  Constitutional:       General: She is not in acute distress.     Appearance: She is well-developed. She is not diaphoretic.   HENT:      Head: Normocephalic and atraumatic.      Mouth/Throat:      Pharynx: No oropharyngeal exudate.   Eyes:      Conjunctiva/sclera: Conjunctivae normal.      Pupils: Pupils are equal, round, and reactive to light.   Neck:      Thyroid: No thyromegaly.      Vascular: No JVD.   Cardiovascular:      Rate and Rhythm: Normal rate and regular rhythm.      Heart sounds: Normal heart sounds. No murmur heard.  Pulmonary:      Effort: Pulmonary effort is normal. No respiratory distress.      Breath sounds: Normal breath sounds. No wheezing or rales.   Chest:      Chest wall: No tenderness.   Abdominal:      General: Bowel sounds are normal. There is no distension.      Palpations: Abdomen is soft.      Tenderness: There is no abdominal tenderness. There is no guarding or rebound.   Musculoskeletal:         General: Normal range of motion.      Cervical back: Normal range of motion and neck  supple.   Lymphadenopathy:      Cervical: No cervical adenopathy.   Skin:     General: Skin is warm and dry.      Findings: No rash.      Comments: Swelling and induration appreciated left submandibular area . Wound dressing in place    Neurological:      Mental Status: She is alert and oriented to person, place, and time.      Cranial Nerves: No cranial nerve deficit.      Sensory: No sensory deficit.      Deep Tendon Reflexes: Reflexes normal.       Significant Labs: All pertinent labs within the past 24 hours have been reviewed.  BMP:   Recent Labs   Lab 07/30/22  0807   *      K 4.6      CO2 23   BUN 13   CREATININE 1.1   CALCIUM 8.8     CBC:   Recent Labs   Lab 07/30/22  0807   WBC 17.64*   HGB 10.8*   HCT 33.8*        CMP:   Recent Labs   Lab 07/30/22  0807      K 4.6      CO2 23   *   BUN 13   CREATININE 1.1   CALCIUM 8.8   ANIONGAP 8   EGFRNONAA 59*       Significant Imaging:       Assessment/Plan:      * Deep neck and  space abscess, left   S/p incision and drainage of left neck abscess per Dr. Serrato today  Managed per primary team   Pain control   IV clindamycin     7/30-  WBC hernandez to 17K  Will change antibiotic with activity against streptococci (including clindamycin-resistant viridans streptococci), H. Influenzae and anaerobic bacteria . Add Doxycycline to cover community acquired MRSA.   Wound care per Primary       Hypertension goal BP (blood pressure) < 140/90  Bp elevated likely situational   Resume amlodipine and losartan   IV hydralazine prn   Monitor       Anemia, Normochromic/normocytic   - Mild normocytic/normochromic anemia noted   -Monitor H/H          VTE Risk Mitigation (From admission, onward)         Ordered     Place sequential compression device  Until discontinued         07/29/22 5942                Discharge Planning   ASTRID:      Code Status: Not on file   Is the patient medically ready for discharge?:     Reason for patient  still in hospital (select all that apply): Patient trending condition                     Amish Ramachandran MD  Department of Hospital Medicine   [facility]

## 2022-07-30 NOTE — PROGRESS NOTES
"Otolaryngology Post Operative Note      SUBJECTIVE:    Doing well since surgery. Pain and swelling improving. Trismus improving.    Hypertensive overnight.     tmax 100.2    satting % this AM on room air    Treated with clindamycin IV.       OBJECTIVE:      /64 (BP Location: Left arm, Patient Position: Lying)   Pulse 63   Temp 99.2 °F (37.3 °C) (Oral)   Resp 20   Ht 5' 2" (1.575 m)   Wt 101.2 kg (223 lb 1.7 oz)   SpO2 99%   Breastfeeding No   BMI 40.81 kg/m²       General:  No acute distress.      HEENT:  left incision clean, dry and intact.  Penrose in place and secured with suture.      Respiratory: Non-labored breathing on room air           WBC 13.3 -> 17.6    ASSESSMENT:     51 y.o. female with left neck abscess of odontogenic origin s/p I&D on 7/29.      PLAN:    - continue IV abx per primary team  - ok for soft diet  - peridex TID after meals  - wound care consult placed - change kerlex BID and flush with saline  - discharge expected 1-2 days with continued improvement with plan for outpatient follow up with oral surgery for dental extraction    "

## 2022-07-30 NOTE — SUBJECTIVE & OBJECTIVE
Interval History:   POD #1, S/P Incision and drainage of deep neck and  space abscesses / Left mandibular and masseteric abscess    Review of Systems   Constitutional:  Negative for activity change, appetite change and fever.   HENT:  Positive for facial swelling. Negative for sore throat.         Left jaw pain   Eyes:  Negative for visual disturbance.   Respiratory:  Negative for cough, chest tightness and shortness of breath.    Cardiovascular:  Negative for chest pain, palpitations and leg swelling.   Gastrointestinal:  Negative for abdominal distention, abdominal pain, constipation, diarrhea, nausea and vomiting.   Endocrine: Negative for polyuria.   Genitourinary:  Negative for decreased urine volume, dysuria, flank pain, frequency and hematuria.   Musculoskeletal:  Negative for back pain and gait problem.   Skin:  Negative for rash.   Neurological:  Negative for syncope, speech difficulty, weakness, light-headedness and headaches.   Psychiatric/Behavioral:  Negative for confusion, hallucinations and sleep disturbance.    Objective:     Vital Signs (Most Recent):  Temp: 98.7 °F (37.1 °C) (07/30/22 1222)  Pulse: 74 (07/30/22 1222)  Resp: 20 (07/30/22 1222)  BP: (!) 182/98 (07/30/22 1222)  SpO2: 98 % (07/30/22 1222)   Vital Signs (24h Range):  Temp:  [97.7 °F (36.5 °C)-100.2 °F (37.9 °C)] 98.7 °F (37.1 °C)  Pulse:  [63-82] 74  Resp:  [15-22] 20  SpO2:  [93 %-100 %] 98 %  BP: (133-187)/(64-98) 182/98     Weight: 101.2 kg (223 lb 1.7 oz)  Body mass index is 40.81 kg/m².    Intake/Output Summary (Last 24 hours) at 7/30/2022 1320  Last data filed at 7/29/2022 2300  Gross per 24 hour   Intake 1090 ml   Output 10 ml   Net 1080 ml      Physical Exam  Constitutional:       General: She is not in acute distress.     Appearance: She is well-developed. She is not diaphoretic.   HENT:      Head: Normocephalic and atraumatic.      Mouth/Throat:      Pharynx: No oropharyngeal exudate.   Eyes:      Conjunctiva/sclera:  Conjunctivae normal.      Pupils: Pupils are equal, round, and reactive to light.   Neck:      Thyroid: No thyromegaly.      Vascular: No JVD.   Cardiovascular:      Rate and Rhythm: Normal rate and regular rhythm.      Heart sounds: Normal heart sounds. No murmur heard.  Pulmonary:      Effort: Pulmonary effort is normal. No respiratory distress.      Breath sounds: Normal breath sounds. No wheezing or rales.   Chest:      Chest wall: No tenderness.   Abdominal:      General: Bowel sounds are normal. There is no distension.      Palpations: Abdomen is soft.      Tenderness: There is no abdominal tenderness. There is no guarding or rebound.   Musculoskeletal:         General: Normal range of motion.      Cervical back: Normal range of motion and neck supple.   Lymphadenopathy:      Cervical: No cervical adenopathy.   Skin:     General: Skin is warm and dry.      Findings: No rash.      Comments: Swelling and induration appreciated left submandibular area . Wound dressing in place    Neurological:      Mental Status: She is alert and oriented to person, place, and time.      Cranial Nerves: No cranial nerve deficit.      Sensory: No sensory deficit.      Deep Tendon Reflexes: Reflexes normal.       Significant Labs: All pertinent labs within the past 24 hours have been reviewed.  BMP:   Recent Labs   Lab 07/30/22  0807   *      K 4.6      CO2 23   BUN 13   CREATININE 1.1   CALCIUM 8.8     CBC:   Recent Labs   Lab 07/30/22  0807   WBC 17.64*   HGB 10.8*   HCT 33.8*        CMP:   Recent Labs   Lab 07/30/22  0807      K 4.6      CO2 23   *   BUN 13   CREATININE 1.1   CALCIUM 8.8   ANIONGAP 8   EGFRNONAA 59*       Significant Imaging:

## 2022-07-30 NOTE — ASSESSMENT & PLAN NOTE
S/p incision and drainage of left neck abscess per Dr. Serrato today  Managed per primary team   Pain control   IV clindamycin     7/30-  WBC hernandez to 17K  Will change antibiotic with activity against streptococci (including clindamycin-resistant viridans streptococci), H. Influenzae and anaerobic bacteria . Add Doxycycline to cover community acquired MRSA.   Wound care per Primary

## 2022-07-30 NOTE — PROGRESS NOTES
Pharmacist Renal Dose Adjustment Note    Latonya Blevins is a 51 y.o. female being treated with the medication enoxaparin for VTE prophylaxis.     Patient Data:    Vital Signs (Most Recent):  Temp: 98.7 °F (37.1 °C) (07/30/22 1222)  Pulse: 74 (07/30/22 1222)  Resp: 20 (07/30/22 1222)  BP: (!) 182/98 (07/30/22 1222)  SpO2: 98 % (07/30/22 1222)   Vital Signs (72h Range):  Temp:  [97.7 °F (36.5 °C)-102 °F (38.9 °C)]   Pulse:  []   Resp:  [15-23]   BP: (133-187)/()   SpO2:  [93 %-100 %]      Recent Labs   Lab 07/28/22  1012 07/30/22  0807   CREATININE 0.8 1.1     Serum creatinine: 1.1 mg/dL 07/30/22 0807  Estimated creatinine clearance: 68.1 mL/min  BMI = 40.81    Per protocol for CrCl > 30 ml/min & BMI > 40, dose will be increased from 40 mg SQ once daily to 40 mg SQ twice daily.     Pharmacist's Name: Katherine E Mcardle  Pharmacist's Extension: [phone]

## 2022-07-30 NOTE — HOSPITAL COURSE
7/30- Tmax 100.2. BP elevated noted. POD #1, S/P Incision and drainage of deep neck and  space abscesses / Left mandibular and masseteric abscess. Abscess gram stain and cultures are pending. Blood cultures - NGTD. Source of infection probable oral or odontogenic. WBC hernandez to 17K>13.K. Will change antibiotic with activity against streptococci (including clindamycin-resistant viridans streptococci), H. Influenzae and anaerobic bacteria . Add Doxycycline to cover community acquired MRSA.     7/31- Some increased in swelling with worsening neck and jaw pain with radiation to chest and arm yesterday, however improved after Decadron 10 mg x 2 dose. Feels better today with decreased swelling and discharge . Swallowing improved . Evaluated by ENT today and suggested incision is clean, dry and intact with Penrose in place and secured with suture. Minimal drainage, but murky/purulent.  Neck and mandibular swelling markedly improved, not resolved. Minimal tenderness. Erythema improved. Trismus very mild. Pt cleared by ENT (Dr. Serrato ) for discharge with oral antibiotic (Augmentin and Doxycyline ) x additional 1 week with follow up in the clinic on Tuesday 8/2/22 to remove drain and Dr. Serrato's office will setup oral surgery appointment  early this week for dental extraction.     Pt examined and deemed stable for discharge to home with oral antibiotic , peridex swish and spit and change fluff BID and flush with the drain with saline 2 times per day at home as instructed by Dr. Serrato.

## 2022-07-31 VITALS
OXYGEN SATURATION: 98 % | WEIGHT: 223.13 LBS | HEIGHT: 62 IN | RESPIRATION RATE: 16 BRPM | SYSTOLIC BLOOD PRESSURE: 162 MMHG | BODY MASS INDEX: 41.06 KG/M2 | HEART RATE: 79 BPM | DIASTOLIC BLOOD PRESSURE: 84 MMHG | TEMPERATURE: 99 F

## 2022-07-31 LAB
ANION GAP SERPL CALC-SCNC: 11 MMOL/L (ref 8–16)
BASOPHILS # BLD AUTO: 0.02 K/UL (ref 0–0.2)
BASOPHILS NFR BLD: 0.2 % (ref 0–1.9)
BUN SERPL-MCNC: 9 MG/DL (ref 6–20)
CALCIUM SERPL-MCNC: 8.7 MG/DL (ref 8.7–10.5)
CHLORIDE SERPL-SCNC: 110 MMOL/L (ref 95–110)
CO2 SERPL-SCNC: 18 MMOL/L (ref 23–29)
CREAT SERPL-MCNC: 0.8 MG/DL (ref 0.5–1.4)
DIFFERENTIAL METHOD: ABNORMAL
EOSINOPHIL # BLD AUTO: 0 K/UL (ref 0–0.5)
EOSINOPHIL NFR BLD: 0 % (ref 0–8)
ERYTHROCYTE [DISTWIDTH] IN BLOOD BY AUTOMATED COUNT: 14.8 % (ref 11.5–14.5)
EST. GFR  (AFRICAN AMERICAN): >60 ML/MIN/1.73 M^2
EST. GFR  (NON AFRICAN AMERICAN): >60 ML/MIN/1.73 M^2
GLUCOSE SERPL-MCNC: 144 MG/DL (ref 70–110)
HCT VFR BLD AUTO: 34.5 % (ref 37–48.5)
HGB BLD-MCNC: 10.9 G/DL (ref 12–16)
IMM GRANULOCYTES # BLD AUTO: 0.07 K/UL (ref 0–0.04)
IMM GRANULOCYTES NFR BLD AUTO: 0.5 % (ref 0–0.5)
LYMPHOCYTES # BLD AUTO: 0.8 K/UL (ref 1–4.8)
LYMPHOCYTES NFR BLD: 6.2 % (ref 18–48)
MAGNESIUM SERPL-MCNC: 1.9 MG/DL (ref 1.6–2.6)
MCH RBC QN AUTO: 27.4 PG (ref 27–31)
MCHC RBC AUTO-ENTMCNC: 31.6 G/DL (ref 32–36)
MCV RBC AUTO: 87 FL (ref 82–98)
MONOCYTES # BLD AUTO: 0.2 K/UL (ref 0.3–1)
MONOCYTES NFR BLD: 1.7 % (ref 4–15)
NEUTROPHILS # BLD AUTO: 12.1 K/UL (ref 1.8–7.7)
NEUTROPHILS NFR BLD: 91.4 % (ref 38–73)
NRBC BLD-RTO: 0 /100 WBC
PHOSPHATE SERPL-MCNC: 2.9 MG/DL (ref 2.7–4.5)
PLATELET # BLD AUTO: 304 K/UL (ref 150–450)
PMV BLD AUTO: 12.6 FL (ref 9.2–12.9)
POTASSIUM SERPL-SCNC: 4.2 MMOL/L (ref 3.5–5.1)
RBC # BLD AUTO: 3.98 M/UL (ref 4–5.4)
SODIUM SERPL-SCNC: 139 MMOL/L (ref 136–145)
TROPONIN I SERPL DL<=0.01 NG/ML-MCNC: <0.006 NG/ML (ref 0–0.03)
WBC # BLD AUTO: 13.23 K/UL (ref 3.9–12.7)

## 2022-07-31 PROCEDURE — 84484 ASSAY OF TROPONIN QUANT: CPT | Performed by: INTERNAL MEDICINE

## 2022-07-31 PROCEDURE — 25000003 PHARM REV CODE 250: Performed by: STUDENT IN AN ORGANIZED HEALTH CARE EDUCATION/TRAINING PROGRAM

## 2022-07-31 PROCEDURE — 80048 BASIC METABOLIC PNL TOTAL CA: CPT | Performed by: INTERNAL MEDICINE

## 2022-07-31 PROCEDURE — 99024 PR POST-OP FOLLOW-UP VISIT: ICD-10-PCS | Mod: ,,, | Performed by: STUDENT IN AN ORGANIZED HEALTH CARE EDUCATION/TRAINING PROGRAM

## 2022-07-31 PROCEDURE — 25000003 PHARM REV CODE 250: Performed by: INTERNAL MEDICINE

## 2022-07-31 PROCEDURE — 63600175 PHARM REV CODE 636 W HCPCS: Performed by: INTERNAL MEDICINE

## 2022-07-31 PROCEDURE — 85025 COMPLETE CBC W/AUTO DIFF WBC: CPT | Performed by: INTERNAL MEDICINE

## 2022-07-31 PROCEDURE — 83735 ASSAY OF MAGNESIUM: CPT | Performed by: INTERNAL MEDICINE

## 2022-07-31 PROCEDURE — 99024 POSTOP FOLLOW-UP VISIT: CPT | Mod: ,,, | Performed by: STUDENT IN AN ORGANIZED HEALTH CARE EDUCATION/TRAINING PROGRAM

## 2022-07-31 PROCEDURE — 84100 ASSAY OF PHOSPHORUS: CPT | Performed by: INTERNAL MEDICINE

## 2022-07-31 PROCEDURE — 36415 COLL VENOUS BLD VENIPUNCTURE: CPT | Performed by: INTERNAL MEDICINE

## 2022-07-31 RX ORDER — SODIUM CHLORIDE 9 MG/ML
INJECTION, SOLUTION INTRAVENOUS
Status: DISCONTINUED | OUTPATIENT
Start: 2022-07-31 | End: 2022-07-31 | Stop reason: HOSPADM

## 2022-07-31 RX ORDER — DOXYCYCLINE HYCLATE 100 MG
100 TABLET ORAL EVERY 12 HOURS
Qty: 14 TABLET | Refills: 0 | Status: SHIPPED | OUTPATIENT
Start: 2022-07-31 | End: 2022-08-07

## 2022-07-31 RX ORDER — BACITRACIN ZINC 500 UNIT/G
OINTMENT (GRAM) TOPICAL 3 TIMES DAILY
Qty: 14 G | Refills: 0 | Status: SHIPPED | OUTPATIENT
Start: 2022-07-31 | End: 2023-01-17

## 2022-07-31 RX ORDER — AMOXICILLIN AND CLAVULANATE POTASSIUM 875; 125 MG/1; MG/1
1 TABLET, FILM COATED ORAL EVERY 12 HOURS
Qty: 14 TABLET | Refills: 0 | Status: SHIPPED | OUTPATIENT
Start: 2022-07-31 | End: 2022-08-07

## 2022-07-31 RX ORDER — CHLORHEXIDINE GLUCONATE ORAL RINSE 1.2 MG/ML
15 SOLUTION DENTAL 2 TIMES DAILY
Qty: 473 ML | Refills: 0 | Status: SHIPPED | OUTPATIENT
Start: 2022-07-31 | End: 2022-08-16

## 2022-07-31 RX ORDER — AMLODIPINE BESYLATE 10 MG/1
10 TABLET ORAL DAILY
Qty: 30 TABLET | Refills: 0 | Status: SHIPPED | OUTPATIENT
Start: 2022-07-31 | End: 2023-02-06 | Stop reason: SDUPTHER

## 2022-07-31 RX ADMIN — IBUPROFEN 400 MG: 400 TABLET, FILM COATED ORAL at 01:07

## 2022-07-31 RX ADMIN — IBUPROFEN 400 MG: 400 TABLET, FILM COATED ORAL at 06:07

## 2022-07-31 RX ADMIN — HYDROCODONE BITARTRATE AND ACETAMINOPHEN 1 TABLET: 7.5; 325 TABLET ORAL at 07:07

## 2022-07-31 RX ADMIN — SODIUM CHLORIDE 3 G: 9 INJECTION, SOLUTION INTRAVENOUS at 04:07

## 2022-07-31 RX ADMIN — AMLODIPINE BESYLATE 5 MG: 5 TABLET ORAL at 09:07

## 2022-07-31 RX ADMIN — SODIUM CHLORIDE 3 G: 9 INJECTION, SOLUTION INTRAVENOUS at 09:07

## 2022-07-31 RX ADMIN — SODIUM CHLORIDE: 0.9 INJECTION, SOLUTION INTRAVENOUS at 04:07

## 2022-07-31 RX ADMIN — ENOXAPARIN SODIUM 40 MG: 100 INJECTION SUBCUTANEOUS at 09:07

## 2022-07-31 RX ADMIN — BACITRACIN ZINC: 500 OINTMENT TOPICAL at 09:07

## 2022-07-31 RX ADMIN — DOXYCYCLINE HYCLATE 100 MG: 100 TABLET, COATED ORAL at 09:07

## 2022-07-31 RX ADMIN — CHLORHEXIDINE GLUCONATE 0.12% ORAL RINSE 15 ML: 1.2 LIQUID ORAL at 09:07

## 2022-07-31 RX ADMIN — LOSARTAN POTASSIUM 50 MG: 50 TABLET, FILM COATED ORAL at 09:07

## 2022-07-31 RX ADMIN — BACITRACIN ZINC: 500 OINTMENT TOPICAL at 02:07

## 2022-07-31 RX ADMIN — IBUPROFEN 400 MG: 400 TABLET, FILM COATED ORAL at 11:07

## 2022-07-31 RX ADMIN — CHLORHEXIDINE GLUCONATE 0.12% ORAL RINSE 15 ML: 1.2 LIQUID ORAL at 02:07

## 2022-07-31 NOTE — DISCHARGE SUMMARY
[facility]  Hospital Medicine  Discharge Summary      Patient Name: Latonya Blevins  MRN: [id number]  Patient Class: OP- Outpatient Recovery  Admission Date: 7/28/2022  Hospital Length of Stay: 0 days  Discharge Date and Time: No discharge date for patient encounter.  Attending Physician: Amish Ramachandran MD   Discharging Provider: Amish Ramachandran MD  Primary Care Provider: Anjelica Laguerre DO      HPI:   Latonya Blevins is a 50 y/o female with history of HTN admitted with odontogenic abscess of left mandible and masseter. Pt s/p incision and drainage per Dr. Serrato today. Pt tolerated procedure well. Hospital medicine has been consulted to assist with medical management.       Procedure(s) (LRB):  INCISION AND DRAINAGE,NECK (Left)      Hospital Course:   7/30- Tmax 100.2. BP elevated noted. POD #1, S/P Incision and drainage of deep neck and  space abscesses / Left mandibular and masseteric abscess. Abscess gram stain and cultures are pending. Blood cultures - NGTD. Source of infection probable oral or odontogenic. WBC hernandez to 17K>13.K. Will change antibiotic with activity against streptococci (including clindamycin-resistant viridans streptococci), H. Influenzae and anaerobic bacteria . Add Doxycycline to cover community acquired MRSA.     7/31- Some increased in swelling with worsening neck and jaw pain with radiation to chest and arm yesterday, however improved after Decadron 10 mg x 2 dose. Feels better today with decreased swelling and discharge . Swallowing improved . Evaluated by ENT today and suggested incision is clean, dry and intact with Penrose in place and secured with suture. Minimal drainage, but murky/purulent.  Neck and mandibular swelling markedly improved, not resolved. Minimal tenderness. Erythema improved. Trismus very mild. Pt cleared by ENT (Dr. Serrato ) for discharge with oral antibiotic (Augmentin and Doxycyline ) x additional 1 week with follow up in the clinic on  Tuesday 8/2/22 to remove drain and Dr. Marc's office will setup oral surgery appointment  early this week for dental extraction.     Pt examined and deemed stable for discharge to home with oral antibiotic , peridex swish and spit and change fluff BID and flush with the drain with saline 2 times per day at home as instructed by Dr. Marc.           Goals of Care Treatment Preferences:         Consults:   Consults (From admission, onward)        Status Ordering Provider     Inpatient consult to Hospitalist  Once        Provider:  Amish Ramachandran MD    Acknowledged RIGOBERTO MARC     Inpatient consult to ENT  Once        Provider:  Rigoberto Marc MD    Completed SELIN DAVIES          * Abscess of parotid masseteric region of face  S/p incision and drainage of left neck abscess per Dr. Marc today  Managed per primary team   Pain control   IV clindamycin     7/30-  WBC hernandez to 17K  Will change antibiotic with activity against streptococci (including clindamycin-resistant viridans streptococci), H. Influenzae and anaerobic bacteria . Add Doxycycline to cover community acquired MRSA.   Wound care per Primary         Final Active Diagnoses:    Diagnosis Date Noted POA    PRINCIPAL PROBLEM:  Abscess of parotid masseteric region of face [L02.01]  Yes    Hypertension goal BP (blood pressure) < 140/90 [I10] 04/29/2019 Yes    Anemia, Normochromic/normocytic  [D64.9] 07/30/2022 Yes      Problems Resolved During this Admission:       Discharged Condition: stable    Disposition: Home or Self Care    Follow Up:   Follow-up Information     Anjelica Laguerre DO. Schedule an appointment as soon as possible for a visit in 3 day(s).    Specialty: Internal Medicine  Why: Discharge follow up  Contact information:  [address] [place] [address]  [phone]             Rigoberto Marc MD Follow up in 3 day(s).    Specialty: Otolaryngology  Why: Discharge follow up  Contact information:  [address]  [facility] [address]  [phone]                       Patient Instructions:      Diet Adult Regular     Order Specific Question Answer Comments   Na restriction, if any: 2gNa      Activity as tolerated       Significant Diagnostic Studies: Labs:   BMP:   Recent Labs   Lab 07/30/22  0807 07/31/22  0547   * 144*    139   K 4.6 4.2    110   CO2 23 18*   BUN 13 9   CREATININE 1.1 0.8   CALCIUM 8.8 8.7   MG  --  1.9   , CMP   Recent Labs   Lab 07/30/22  0807 07/31/22  0547    139   K 4.6 4.2    110   CO2 23 18*   * 144*   BUN 13 9   CREATININE 1.1 0.8   CALCIUM 8.8 8.7   ANIONGAP 8 11   ESTGFRAFRICA >60 >60   EGFRNONAA 59* >60    and CBC   Recent Labs   Lab 07/30/22  0807 07/31/22  0547   WBC 17.64* 13.23*   HGB 10.8* 10.9*   HCT 33.8* 34.5*    304     Microbiology: Abscess  Culture: pending     Pending Diagnostic Studies:     None         Medications:  Reconciled Home Medications:      Medication List      START taking these medications    amoxicillin-clavulanate 875-125mg 875-125 mg per tablet  Commonly known as: AUGMENTIN  Take 1 tablet by mouth every 12 (twelve) hours. for 7 days     bacitracin 500 unit/gram Oint  Apply topically 3 (three) times daily.     chlorhexidine 0.12 % solution  Commonly known as: PERIDEX  Use as directed 15 mLs in the mouth or throat 2 (two) times daily. for 16 days     doxycycline 100 MG tablet  Commonly known as: VIBRA-TABS  Take 1 tablet (100 mg total) by mouth every 12 (twelve) hours. for 7 days        CHANGE how you take these medications    amLODIPine 10 MG tablet  Commonly known as: NORVASC  Take 1 tablet (10 mg total) by mouth once daily.  What changed:   · medication strength  · how much to take        CONTINUE taking these medications    ergocalciferol 50,000 unit Cap  Commonly known as: ERGOCALCIFEROL  Take 50,000 Units by mouth every 7 days.     EXCEDRIN EXTRA STRENGTH 250-250-65 mg per tablet  Generic drug:  aspirin-acetaminophen-caffeine 250-250-65 mg  Take 2 tablets by mouth every 6 (six) hours as needed for Pain.     HYDROcodone-acetaminophen 7.5-325 mg per tablet  Commonly known as: NORCO  Take 1 tablet by mouth every 4 (four) hours as needed for Pain.     losartan 50 MG tablet  Commonly known as: COZAAR  Take 50 mg by mouth once daily.            Indwelling Lines/Drains at time of discharge:   Lines/Drains/Airways     Drain  Duration                Open Drain 07/29/22 1725 Ventral Neck 1 day                Time spent on the discharge of patient: 30  minutes         Amish Ramachandran MD  Department of Hospital Medicine  [facility]

## 2022-07-31 NOTE — PROGRESS NOTES
"Otolaryngology Post Operative Note      SUBJECTIVE:    Yesterday mid-day had some worsening pain in jaw, arm, and chest. CXR and troponins negative. Given round of steroids. Much improved.     Currently states swelling and pain are much better. Minimal drainage. Eating well.       OBJECTIVE:      BP (!) 162/84 (BP Location: Left arm, Patient Position: Lying)   Pulse 79   Temp 98.6 °F (37 °C) (Oral)   Resp 16   Ht 5' 2" (1.575 m)   Wt 101.2 kg (223 lb 1.7 oz)   SpO2 98%   Breastfeeding No   BMI 40.81 kg/m²       General:  No acute distress.      HEENT:  left incision clean, dry and intact. Penrose in place and secured with suture. Minimal drainage, but murky/purulent.  Neck and mandibular swelling markedly improved, not resolved. Minimal tenderness. Erythema improved. Trismus very mild.       Respiratory: Non-labored breathing on room air         labs  WBC 17.6 -> 13.2     Micro  Cultures in progress    ASSESSMENT:     51 y.o. female with left neck abscess of odontogenic origin s/p I&D on 7/29.      PLAN:    - ok for discharge with drain in place from ENT standpoint  - oral abx per primary x 7 additional days  - peridex TID after meals  - change fluff BID and flush with the drain with saline 2 times per day at home  - Return to clinic 2 days for drain removal   - oral surgery consult ASAP early this week for dental extraction    "

## 2022-07-31 NOTE — PLAN OF CARE
Problem: Infection  Goal: Absence of Infection Signs and Symptoms  Intervention: Prevent or Manage Infection  Flowsheets (Taken 7/31/2022 0249)  Fever Reduction/Comfort Measures: lightweight clothing  Infection Management: aseptic technique maintained     Problem: Pain Acute  Goal: Acceptable Pain Control and Functional Ability  Intervention: Develop Pain Management Plan  Flowsheets (Taken 7/31/2022 0249)  Pain Management Interventions:   around-the-clock dosing utilized   medication offered but refused   pain management plan reviewed with patient/caregiver   pillow support provided   position adjusted   quiet environment facilitated   relaxation techniques promoted  Intervention: Prevent or Manage Pain  Flowsheets (Taken 7/31/2022 0249)  Sleep/Rest Enhancement:   relaxation techniques promoted   natural light exposure provided   regular sleep/rest pattern promoted  Medication Review/Management: medications reviewed     Problem: Skin or Soft Tissue Infection  Goal: Absence of Infection Signs and Symptoms  Intervention: Minimize and Manage Infection Progression  Flowsheets (Taken 7/31/2022 0249)  Fever Reduction/Comfort Measures: lightweight clothing  Infection Management: aseptic technique maintained

## 2022-07-31 NOTE — PLAN OF CARE
Discussed Plan of Care with patient and verbalized understanding - Patient remains AAOx4 - remains free of falls, accidents and trauma during the day shift. Bed is in the low position and the call light is within reach. Diet advanced to Full Liquid, patient tolerating well. Will continue to monitor

## 2022-07-31 NOTE — NURSING
Discharge summary, health teachings and instructions given to patient with daughter at bedside--verbalized understanding. IV removed-no complications noted. Tele monitor removed and returned to the monitor room. All questions answered with regards to discharge instructions. Reminded of the importance of follow-up appointments. Awaiting for pickup.

## 2022-07-31 NOTE — PLAN OF CARE
[facility] 3  Discharge Final Note    Primary Care Provider: Anjelica Laguerre DO    Expected Discharge Date: 7/31/2022    Final Discharge Note (most recent)     Final Note - 07/31/22 1248        Final Note    Assessment Type Final Discharge Note     Anticipated Discharge Disposition Home or Self Care        Post-Acute Status    Discharge Delays None known at this time                 Important Message from Medicare             Contact Info     Anjelica Laguerre DO   Specialty: Internal Medicine   Relationship: PCP - [address] [place] [address]   Phone: [phone]       Next Steps: Schedule an appointment as soon as possible for a visit in 3 day(s)    Instructions: Discharge follow up    Rigoberto Serrato MD   Specialty: Otolaryngology    [address] [facility]  [place] [address]   Phone: [phone]       Next Steps: Follow up in 3 day(s)    Instructions: Discharge follow up        Pt has no discharge needs at the time of chart review.

## 2022-08-01 ENCOUNTER — TELEPHONE (OUTPATIENT)
Dept: INTERNAL MEDICINE | Facility: CLINIC | Age: 52
End: 2022-08-01
Payer: COMMERCIAL

## 2022-08-01 ENCOUNTER — TELEPHONE (OUTPATIENT)
Dept: OTOLARYNGOLOGY | Facility: CLINIC | Age: 52
End: 2022-08-01
Payer: COMMERCIAL

## 2022-08-01 DIAGNOSIS — K04.7 DENTAL ABSCESS: ICD-10-CM

## 2022-08-01 DIAGNOSIS — L02.01 ABSCESS OF PAROTID MASSETERIC REGION OF FACE: Primary | ICD-10-CM

## 2022-08-01 LAB — BACTERIA SPEC AEROBE CULT: NORMAL

## 2022-08-01 NOTE — TELEPHONE ENCOUNTER
----- Message from Anjelica Laguerre DO sent at 8/1/2022  2:21 PM CDT -----  Regarding: FW: Hospital follow up    ----- Message -----  From: Eleanor Mccurdy RN  Sent: 7/31/2022   1:48 PM CDT  To: Anjelica Laguerre DO  Subject: Hospital follow up                               Please call patient for an appointment in 3 days. Thanks!

## 2022-08-01 NOTE — TELEPHONE ENCOUNTER
----- Message from Rigoberto Serrato MD sent at 8/1/2022 12:09 PM CDT -----     [facility] - [place]    [phone]

## 2022-08-02 ENCOUNTER — OFFICE VISIT (OUTPATIENT)
Dept: OTOLARYNGOLOGY | Facility: CLINIC | Age: 52
End: 2022-08-02
Payer: COMMERCIAL

## 2022-08-02 ENCOUNTER — PATIENT OUTREACH (OUTPATIENT)
Dept: ADMINISTRATIVE | Facility: HOSPITAL | Age: 52
End: 2022-08-02
Payer: COMMERCIAL

## 2022-08-02 VITALS — BODY MASS INDEX: 40.6 KG/M2 | TEMPERATURE: 98 F | WEIGHT: 222 LBS

## 2022-08-02 DIAGNOSIS — L02.01 ABSCESS OF PAROTID MASSETERIC REGION OF FACE: Primary | ICD-10-CM

## 2022-08-02 LAB
BACTERIA BLD CULT: NORMAL
BACTERIA BLD CULT: NORMAL

## 2022-08-02 PROCEDURE — 3008F PR BODY MASS INDEX (BMI) DOCUMENTED: ICD-10-PCS | Mod: CPTII,S$GLB,, | Performed by: STUDENT IN AN ORGANIZED HEALTH CARE EDUCATION/TRAINING PROGRAM

## 2022-08-02 PROCEDURE — 99024 PR POST-OP FOLLOW-UP VISIT: ICD-10-PCS | Mod: S$GLB,,, | Performed by: STUDENT IN AN ORGANIZED HEALTH CARE EDUCATION/TRAINING PROGRAM

## 2022-08-02 PROCEDURE — 99024 POSTOP FOLLOW-UP VISIT: CPT | Mod: S$GLB,,, | Performed by: STUDENT IN AN ORGANIZED HEALTH CARE EDUCATION/TRAINING PROGRAM

## 2022-08-02 PROCEDURE — 4010F PR ACE/ARB THEARPY RXD/TAKEN: ICD-10-PCS | Mod: CPTII,S$GLB,, | Performed by: STUDENT IN AN ORGANIZED HEALTH CARE EDUCATION/TRAINING PROGRAM

## 2022-08-02 PROCEDURE — 3008F BODY MASS INDEX DOCD: CPT | Mod: CPTII,S$GLB,, | Performed by: STUDENT IN AN ORGANIZED HEALTH CARE EDUCATION/TRAINING PROGRAM

## 2022-08-02 PROCEDURE — 99999 PR PBB SHADOW E&M-EST. PATIENT-LVL II: CPT | Mod: PBBFAC,,, | Performed by: STUDENT IN AN ORGANIZED HEALTH CARE EDUCATION/TRAINING PROGRAM

## 2022-08-02 PROCEDURE — 4010F ACE/ARB THERAPY RXD/TAKEN: CPT | Mod: CPTII,S$GLB,, | Performed by: STUDENT IN AN ORGANIZED HEALTH CARE EDUCATION/TRAINING PROGRAM

## 2022-08-02 PROCEDURE — 99999 PR PBB SHADOW E&M-EST. PATIENT-LVL II: ICD-10-PCS | Mod: PBBFAC,,, | Performed by: STUDENT IN AN ORGANIZED HEALTH CARE EDUCATION/TRAINING PROGRAM

## 2022-08-02 NOTE — TELEPHONE ENCOUNTER
Pt referred to [facility].  I placed call to them and was advised first available was 2+ weeks away.  Dr Serrato called and spoke with their office and pt was given appt on 8/3/2022 @10am.  Referral, consult note, op note and demographics was faxed to office at [phone].  All paperwork given to pt.  She was instructed to go to radiology and get images scanned to disc to bring to appt.

## 2022-08-02 NOTE — PROGRESS NOTES
Called patient to confirm hospital follow up scheduled on 8/04/2022.   Patient is not confirmed. LM to confirm appt.

## 2022-08-02 NOTE — PROGRESS NOTES
Otolaryngology Post Operative Note    SUBJECTIVE:    Swelling and pain continue to improve since surgery. No fevers. Minimal drainage. Trismus improving.      OBJECTIVE:      There were no vitals taken for this visit.      General:  No acute distress.      HEENT:  Left neck incision open, penrose in place (removed and packed with ribbon gauze), still some minimal murky drainage, moderate left mandible swelling and induration (much improved form initial exam), mild-moderate trismus (much improved)    Respiratory: Non-labored breathing.          ASSESSMENT:     51 y.o. female with odontogenic abscess s/p I&D on 7/30.      PLAN:    Finish abx as prescribed  Continue peridex   Change ribbon guaze dressing BID  F/u OMFS tomorrow for dental extraction consult

## 2022-08-05 LAB — BACTERIA SPEC ANAEROBE CULT: ABNORMAL

## 2022-10-06 LAB — BCS RECOMMENDATION EXT: NORMAL

## 2022-10-20 ENCOUNTER — PATIENT MESSAGE (OUTPATIENT)
Dept: ADMINISTRATIVE | Facility: HOSPITAL | Age: 52
End: 2022-10-20
Payer: COMMERCIAL

## 2023-01-08 PROCEDURE — 93005 ELECTROCARDIOGRAM TRACING: CPT

## 2023-01-08 PROCEDURE — 93010 ELECTROCARDIOGRAM REPORT: CPT | Mod: ,,, | Performed by: INTERNAL MEDICINE

## 2023-01-08 PROCEDURE — 93010 EKG 12-LEAD: ICD-10-PCS | Mod: ,,, | Performed by: INTERNAL MEDICINE

## 2023-01-08 PROCEDURE — 99285 EMERGENCY DEPT VISIT HI MDM: CPT | Mod: 25

## 2023-01-09 ENCOUNTER — HOSPITAL ENCOUNTER (EMERGENCY)
Facility: HOSPITAL | Age: 53
Discharge: HOME | End: 2023-01-09
Attending: EMERGENCY MEDICINE
Payer: COMMERCIAL

## 2023-01-09 ENCOUNTER — OFFICE VISIT (OUTPATIENT)
Dept: INTERNAL MEDICINE | Facility: CLINIC | Age: 53
End: 2023-01-09
Payer: COMMERCIAL

## 2023-01-09 VITALS
HEART RATE: 66 BPM | BODY MASS INDEX: 42.88 KG/M2 | RESPIRATION RATE: 18 BRPM | TEMPERATURE: 99 F | HEIGHT: 62 IN | DIASTOLIC BLOOD PRESSURE: 67 MMHG | WEIGHT: 233 LBS | SYSTOLIC BLOOD PRESSURE: 151 MMHG | OXYGEN SATURATION: 99 %

## 2023-01-09 VITALS
HEART RATE: 93 BPM | TEMPERATURE: 98 F | BODY MASS INDEX: 42.4 KG/M2 | OXYGEN SATURATION: 100 % | SYSTOLIC BLOOD PRESSURE: 144 MMHG | DIASTOLIC BLOOD PRESSURE: 90 MMHG | HEIGHT: 62 IN | WEIGHT: 230.38 LBS | RESPIRATION RATE: 20 BRPM

## 2023-01-09 DIAGNOSIS — M54.16 LUMBAR RADICULOPATHY: Primary | ICD-10-CM

## 2023-01-09 DIAGNOSIS — Z12.11 COLON CANCER SCREENING: ICD-10-CM

## 2023-01-09 DIAGNOSIS — R07.9 CHEST PAIN, UNSPECIFIED TYPE: Primary | ICD-10-CM

## 2023-01-09 DIAGNOSIS — I10 ESSENTIAL HYPERTENSION: ICD-10-CM

## 2023-01-09 DIAGNOSIS — R52 PAIN: ICD-10-CM

## 2023-01-09 DIAGNOSIS — M17.11 PRIMARY OSTEOARTHRITIS OF RIGHT KNEE: ICD-10-CM

## 2023-01-09 DIAGNOSIS — E66.01 MORBID OBESITY WITH BMI OF 40.0-44.9, ADULT: ICD-10-CM

## 2023-01-09 DIAGNOSIS — R07.9 CHEST PAIN: ICD-10-CM

## 2023-01-09 LAB
ALBUMIN SERPL BCP-MCNC: 3.5 G/DL (ref 3.5–5.2)
ALP SERPL-CCNC: 88 U/L (ref 55–135)
ALT SERPL W/O P-5'-P-CCNC: 8 U/L (ref 10–44)
ANION GAP SERPL CALC-SCNC: 10 MMOL/L (ref 8–16)
AST SERPL-CCNC: 11 U/L (ref 10–40)
BASOPHILS # BLD AUTO: 0.07 K/UL (ref 0–0.2)
BASOPHILS NFR BLD: 0.5 % (ref 0–1.9)
BILIRUB SERPL-MCNC: 0.2 MG/DL (ref 0.1–1)
BILIRUB UR QL STRIP: NEGATIVE
BNP SERPL-MCNC: 48 PG/ML (ref 0–99)
BUN SERPL-MCNC: 11 MG/DL (ref 6–20)
CALCIUM SERPL-MCNC: 9.7 MG/DL (ref 8.7–10.5)
CHLORIDE SERPL-SCNC: 106 MMOL/L (ref 95–110)
CLARITY UR: CLEAR
CO2 SERPL-SCNC: 22 MMOL/L (ref 23–29)
COLOR UR: COLORLESS
CREAT SERPL-MCNC: 0.7 MG/DL (ref 0.5–1.4)
DIFFERENTIAL METHOD: ABNORMAL
EOSINOPHIL # BLD AUTO: 0.3 K/UL (ref 0–0.5)
EOSINOPHIL NFR BLD: 1.8 % (ref 0–8)
ERYTHROCYTE [DISTWIDTH] IN BLOOD BY AUTOMATED COUNT: 14.9 % (ref 11.5–14.5)
EST. GFR  (NO RACE VARIABLE): >60 ML/MIN/1.73 M^2
GIANT PLATELETS BLD QL SMEAR: ABNORMAL
GLUCOSE SERPL-MCNC: 123 MG/DL (ref 70–110)
GLUCOSE UR QL STRIP: NEGATIVE
HCT VFR BLD AUTO: 36.5 % (ref 37–48.5)
HCV AB SERPL QL IA: NEGATIVE
HEP C VIRUS HOLD SPECIMEN: NORMAL
HGB BLD-MCNC: 12.4 G/DL (ref 12–16)
HGB UR QL STRIP: NEGATIVE
HIV 1+2 AB+HIV1 P24 AG SERPL QL IA: NEGATIVE
HYPOCHROMIA BLD QL SMEAR: ABNORMAL
IMM GRANULOCYTES # BLD AUTO: 0.05 K/UL (ref 0–0.04)
IMM GRANULOCYTES NFR BLD AUTO: 0.4 % (ref 0–0.5)
KETONES UR QL STRIP: NEGATIVE
LEUKOCYTE ESTERASE UR QL STRIP: NEGATIVE
LIPASE SERPL-CCNC: 253 U/L (ref 4–60)
LYMPHOCYTES # BLD AUTO: 2.7 K/UL (ref 1–4.8)
LYMPHOCYTES NFR BLD: 19.4 % (ref 18–48)
MCH RBC QN AUTO: 29.2 PG (ref 27–31)
MCHC RBC AUTO-ENTMCNC: 34 G/DL (ref 32–36)
MCV RBC AUTO: 86 FL (ref 82–98)
MONOCYTES # BLD AUTO: 1.4 K/UL (ref 0.3–1)
MONOCYTES NFR BLD: 9.8 % (ref 4–15)
NEUTROPHILS # BLD AUTO: 9.4 K/UL (ref 1.8–7.7)
NEUTROPHILS NFR BLD: 68.1 % (ref 38–73)
NITRITE UR QL STRIP: NEGATIVE
NRBC BLD-RTO: 0 /100 WBC
OVALOCYTES BLD QL SMEAR: ABNORMAL
PH UR STRIP: 7 [PH] (ref 5–8)
PLATELET # BLD AUTO: 212 K/UL (ref 150–450)
PLATELET BLD QL SMEAR: ABNORMAL
PMV BLD AUTO: 11.8 FL (ref 9.2–12.9)
POIKILOCYTOSIS BLD QL SMEAR: SLIGHT
POTASSIUM SERPL-SCNC: 3.5 MMOL/L (ref 3.5–5.1)
PROT SERPL-MCNC: 6.8 G/DL (ref 6–8.4)
PROT UR QL STRIP: NEGATIVE
RBC # BLD AUTO: 4.25 M/UL (ref 4–5.4)
SODIUM SERPL-SCNC: 138 MMOL/L (ref 136–145)
SP GR UR STRIP: 1.01 (ref 1–1.03)
TROPONIN I SERPL DL<=0.01 NG/ML-MCNC: <0.006 NG/ML (ref 0–0.03)
TROPONIN I SERPL DL<=0.01 NG/ML-MCNC: <0.006 NG/ML (ref 0–0.03)
URN SPEC COLLECT METH UR: ABNORMAL
UROBILINOGEN UR STRIP-ACNC: NEGATIVE EU/DL
WBC # BLD AUTO: 13.79 K/UL (ref 3.9–12.7)

## 2023-01-09 PROCEDURE — 1159F MED LIST DOCD IN RCRD: CPT | Mod: CPTII,S$GLB,, | Performed by: INTERNAL MEDICINE

## 2023-01-09 PROCEDURE — 84484 ASSAY OF TROPONIN QUANT: CPT | Mod: 91 | Performed by: EMERGENCY MEDICINE

## 2023-01-09 PROCEDURE — 63600175 PHARM REV CODE 636 W HCPCS: Performed by: EMERGENCY MEDICINE

## 2023-01-09 PROCEDURE — 25500020 PHARM REV CODE 255: Performed by: EMERGENCY MEDICINE

## 2023-01-09 PROCEDURE — 99214 PR OFFICE/OUTPT VISIT, EST, LEVL IV, 30-39 MIN: ICD-10-PCS | Mod: S$GLB,,, | Performed by: INTERNAL MEDICINE

## 2023-01-09 PROCEDURE — 83690 ASSAY OF LIPASE: CPT | Performed by: EMERGENCY MEDICINE

## 2023-01-09 PROCEDURE — 99214 OFFICE O/P EST MOD 30 MIN: CPT | Mod: S$GLB,,, | Performed by: INTERNAL MEDICINE

## 2023-01-09 PROCEDURE — 3080F DIAST BP >= 90 MM HG: CPT | Mod: CPTII,S$GLB,, | Performed by: INTERNAL MEDICINE

## 2023-01-09 PROCEDURE — 1159F PR MEDICATION LIST DOCUMENTED IN MEDICAL RECORD: ICD-10-PCS | Mod: CPTII,S$GLB,, | Performed by: INTERNAL MEDICINE

## 2023-01-09 PROCEDURE — 3077F PR MOST RECENT SYSTOLIC BLOOD PRESSURE >= 140 MM HG: ICD-10-PCS | Mod: CPTII,S$GLB,, | Performed by: INTERNAL MEDICINE

## 2023-01-09 PROCEDURE — 87389 HIV-1 AG W/HIV-1&-2 AB AG IA: CPT | Performed by: EMERGENCY MEDICINE

## 2023-01-09 PROCEDURE — 25000003 PHARM REV CODE 250: Performed by: EMERGENCY MEDICINE

## 2023-01-09 PROCEDURE — 80053 COMPREHEN METABOLIC PANEL: CPT | Performed by: EMERGENCY MEDICINE

## 2023-01-09 PROCEDURE — 99999 PR PBB SHADOW E&M-EST. PATIENT-LVL V: CPT | Mod: PBBFAC,,, | Performed by: INTERNAL MEDICINE

## 2023-01-09 PROCEDURE — 3008F BODY MASS INDEX DOCD: CPT | Mod: CPTII,S$GLB,, | Performed by: INTERNAL MEDICINE

## 2023-01-09 PROCEDURE — 96374 THER/PROPH/DIAG INJ IV PUSH: CPT | Mod: 59

## 2023-01-09 PROCEDURE — 3077F SYST BP >= 140 MM HG: CPT | Mod: CPTII,S$GLB,, | Performed by: INTERNAL MEDICINE

## 2023-01-09 PROCEDURE — 3080F PR MOST RECENT DIASTOLIC BLOOD PRESSURE >= 90 MM HG: ICD-10-PCS | Mod: CPTII,S$GLB,, | Performed by: INTERNAL MEDICINE

## 2023-01-09 PROCEDURE — 1160F PR REVIEW ALL MEDS BY PRESCRIBER/CLIN PHARMACIST DOCUMENTED: ICD-10-PCS | Mod: CPTII,S$GLB,, | Performed by: INTERNAL MEDICINE

## 2023-01-09 PROCEDURE — 86803 HEPATITIS C AB TEST: CPT | Performed by: EMERGENCY MEDICINE

## 2023-01-09 PROCEDURE — 85025 COMPLETE CBC W/AUTO DIFF WBC: CPT | Performed by: EMERGENCY MEDICINE

## 2023-01-09 PROCEDURE — 4010F ACE/ARB THERAPY RXD/TAKEN: CPT | Mod: CPTII,S$GLB,, | Performed by: INTERNAL MEDICINE

## 2023-01-09 PROCEDURE — 83880 ASSAY OF NATRIURETIC PEPTIDE: CPT | Performed by: EMERGENCY MEDICINE

## 2023-01-09 PROCEDURE — 99999 PR PBB SHADOW E&M-EST. PATIENT-LVL V: ICD-10-PCS | Mod: PBBFAC,,, | Performed by: INTERNAL MEDICINE

## 2023-01-09 PROCEDURE — 1160F RVW MEDS BY RX/DR IN RCRD: CPT | Mod: CPTII,S$GLB,, | Performed by: INTERNAL MEDICINE

## 2023-01-09 PROCEDURE — 81003 URINALYSIS AUTO W/O SCOPE: CPT | Performed by: EMERGENCY MEDICINE

## 2023-01-09 PROCEDURE — 4010F PR ACE/ARB THEARPY RXD/TAKEN: ICD-10-PCS | Mod: CPTII,S$GLB,, | Performed by: INTERNAL MEDICINE

## 2023-01-09 PROCEDURE — 3008F PR BODY MASS INDEX (BMI) DOCUMENTED: ICD-10-PCS | Mod: CPTII,S$GLB,, | Performed by: INTERNAL MEDICINE

## 2023-01-09 RX ORDER — ASPIRIN 325 MG
325 TABLET ORAL DAILY
COMMUNITY
End: 2023-01-17

## 2023-01-09 RX ORDER — NAPROXEN 500 MG/1
500 TABLET ORAL 2 TIMES DAILY WITH MEALS
Qty: 60 TABLET | Refills: 0 | Status: SHIPPED | OUTPATIENT
Start: 2023-01-09 | End: 2023-01-09 | Stop reason: SDUPTHER

## 2023-01-09 RX ORDER — HYDROCODONE BITARTRATE AND ACETAMINOPHEN 7.5; 325 MG/1; MG/1
1 TABLET ORAL EVERY 6 HOURS PRN
Qty: 20 TABLET | Refills: 0 | Status: SHIPPED | OUTPATIENT
Start: 2023-01-09 | End: 2023-01-17 | Stop reason: ALTCHOICE

## 2023-01-09 RX ORDER — ACETAMINOPHEN 500 MG
1000 TABLET ORAL
Status: COMPLETED | OUTPATIENT
Start: 2023-01-09 | End: 2023-01-09

## 2023-01-09 RX ORDER — NAPROXEN 500 MG/1
500 TABLET ORAL 2 TIMES DAILY WITH MEALS
Qty: 60 TABLET | Refills: 0 | Status: SHIPPED | OUTPATIENT
Start: 2023-01-09 | End: 2023-02-06

## 2023-01-09 RX ORDER — KETOROLAC TROMETHAMINE 30 MG/ML
15 INJECTION, SOLUTION INTRAMUSCULAR; INTRAVENOUS
Status: COMPLETED | OUTPATIENT
Start: 2023-01-09 | End: 2023-01-09

## 2023-01-09 RX ADMIN — ACETAMINOPHEN 1000 MG: 500 TABLET ORAL at 01:01

## 2023-01-09 RX ADMIN — KETOROLAC TROMETHAMINE 15 MG: 30 INJECTION, SOLUTION INTRAMUSCULAR; INTRAVENOUS at 01:01

## 2023-01-09 RX ADMIN — IOHEXOL 100 ML: 350 INJECTION, SOLUTION INTRAVENOUS at 02:01

## 2023-01-09 NOTE — PROGRESS NOTES
Subjective:       Patient ID: Latonya Mendoza is a 52 y.o. female.    Chief Complaint: Back Pain    Back Pain  This is a chronic problem. The current episode started more than 1 month ago. The problem occurs intermittently. The problem has been gradually worsening since onset. The pain is present in the lumbar spine. The quality of the pain is described as aching. The pain radiates to the right knee and right thigh. The pain is moderate. The symptoms are aggravated by standing. Associated symptoms include chest pain and leg pain. Pertinent negatives include no dysuria, fever, numbness, paresis, paresthesias, tingling, weakness or weight loss. Risk factors include obesity. She has tried NSAIDs for the symptoms. The treatment provided mild relief.   Denies trauma.   Went to the ED today. Had lumbar spine x-ray which showed lumbar DDD.   UA negative.   Was having CP and cardiac enzymes, EKG and CXR were negative.   HTN--has not taken b/p medication today.     Review of Systems   Constitutional:  Negative for fever and weight loss.   Cardiovascular:  Positive for chest pain.   Genitourinary:  Negative for dysuria.   Musculoskeletal:  Positive for back pain and leg pain.   Neurological:  Negative for tingling, weakness, numbness and paresthesias.       Objective:      Physical Exam  Vitals reviewed.   Constitutional:       General: She is not in acute distress.     Appearance: She is well-developed. She is obese. She is not ill-appearing.   Cardiovascular:      Rate and Rhythm: Normal rate.   Pulmonary:      Effort: Pulmonary effort is normal. No respiratory distress.   Musculoskeletal:      Lumbar back: Tenderness present. No swelling, edema, deformity, signs of trauma, lacerations or bony tenderness. Decreased range of motion.      Right lower leg: No edema.      Left lower leg: No edema.   Skin:     General: Skin is warm and dry.   Neurological:      Mental Status: She is alert and oriented to person, place, and  time.      Sensory: Sensation is intact.      Motor: Motor function is intact. No weakness.   Psychiatric:         Behavior: Behavior normal.         Thought Content: Thought content normal.         Judgment: Judgment normal.       Assessment:       Problem List Items Addressed This Visit       Morbid obesity with BMI of 40.0-44.9, adult     Other Visit Diagnoses       Lumbar radiculopathy    -  Primary    Relevant Medications    naproxen (NAPROSYN) 500 MG tablet    Other Relevant Orders    Ambulatory referral/consult to Physical/Occupational Therapy    Essential hypertension        Colon cancer screening        Relevant Orders    Fecal Immunochemical Test (iFOBT)              Plan:       Latonya was seen today for back pain.    Diagnoses and all orders for this visit:    Lumbar radiculopathy  -     Ambulatory referral/consult to Physical/Occupational Therapy; Future  -     naproxen (NAPROSYN) 500 MG tablet; Take 1 tablet (500 mg total) by mouth 2 (two) times daily with meals.    Essential hypertension  -     counseled regarding compliance with medication  -     Lifestyle modifications discussed    Morbid obesity with BMI of 40.0-44.9, adult  -     Lifestyle modifications discussed    Colon cancer screening  -     Fecal Immunochemical Test (iFOBT); Future    F/U in 4 weeks.

## 2023-01-09 NOTE — ED PROVIDER NOTES
SCRIBE #1 NOTE: I, Kelsea Coello, am scribing for, and in the presence of, Noni Bhandari Do, MD. I have scribed the entire note.       History     Chief Complaint   Patient presents with    Chest Pain     CP under L breast x2 days with R sided leg pain. Pt reports to taking x1 325mg Aspirin around 2100 today & pepto bismol.      Review of patient's allergies indicates:  No Known Allergies      History of Present Illness     HPI    1/9/2023, 12:33 AM  History obtained from the patient      History of Present Illness: Latonya Mendoza is a 52 y.o. female patient with a PMHx of hypertension who presents to the Emergency Department for evaluation of chest pain which onset 2 days ago. Pt describes pain under both breasts that radiates to her nipples and sometimes feels as if it radiates into right hip and leg. Pt had surgery in July for left facial abscess, says tshe started to have pain to right hip and knee after the surgery and now pain has been debilitating since.  She did go see her primary care doctor who did x-ray of her hip.  She did not get the results of it.  She tried to call her doctor bag and the line has been disconnected.  So she never got the results of her x-rays.      Pt denies drug use. Symptoms are constant and moderate in severity. Urgent care last Friday did ekg and chest x-ray, told everything was normal. No mitigating or exacerbating factors reported. Patient denies any fever, chills, urinary and fecal incontinence, N/V/D, and all other sxs at this time. Pt takes 1 325mg asprin every day. No further complaints or concerns at this time.     She does have a history of high blood pressure but no diabetes or cholesterol.  She reports that she does  smoke but does not drink or use any drugs.      Arrival mode: Personal vehicle    PCP: Anjelica Laguerre DO        Past Medical History:  Past Medical History:   Diagnosis Date    Hypertension        Past Surgical History:  Past Surgical History:    Procedure Laterality Date     SECTION  2008    DILATION AND CURETTAGE OF UTERUS  2018    sab;     INCISION AND DRAINAGE, NECK Left 2022    Procedure: INCISION AND DRAINAGE,NECK;  Surgeon: Rigoberto Serrato MD;  Location: [facility];  Service: ENT;  Laterality: Left;         Family History:  Family History   Problem Relation Age of Onset    Brain cancer Mother     Breast cancer Mother 62    Lung cancer Father     Brain cancer Father     Breast cancer Maternal Aunt     Breast cancer Paternal Aunt        Social History:  Social History     Tobacco Use    Smoking status: Former     Packs/day: 0.50     Types: Cigarettes     Start date:     Smokeless tobacco: Never   Substance and Sexual Activity    Alcohol use: No    Drug use: No    Sexual activity: Yes     Partners: Male        Review of Systems     Review of Systems   Constitutional:  Negative for chills and fever.   HENT:  Negative for congestion and sore throat.    Eyes:  Negative for pain.   Respiratory:  Negative for cough and shortness of breath.    Cardiovascular:  Negative for chest pain and palpitations.   Gastrointestinal:  Negative for diarrhea, nausea and vomiting.   Genitourinary:  Negative for dysuria and hematuria.   Musculoskeletal:  Positive for arthralgias (right knee). Negative for neck pain.   Skin:  Negative for rash and wound.   Neurological:  Negative for weakness and numbness.   Psychiatric/Behavioral:  Negative for agitation and confusion.    All other systems reviewed and are negative.     Physical Exam     Initial Vitals [23 2347]   BP Pulse Resp Temp SpO2   (!) 191/114 93 18 98.3 °F (36.8 °C) 100 %      MAP       --          Physical Exam  Nursing Notes and Vital Signs Reviewed.  Constitutional: Patient is in no acute distress. Well-developed and well-nourished.  Head: Atraumatic. Normocephalic.  Eyes: EOM intact. Conjunctivae are not pale. No scleral icterus.  ENT: Mucous membranes are moist. Oropharynx is  "clear and symmetric.    Neck: Supple. Full ROM.   Cardiovascular: Regular rate. Regular rhythm. No murmurs, rubs, or gallops. Distal pulses are 2+ and symmetric.  Pulmonary/Chest: No respiratory distress. Clear to auscultation bilaterally. No wheezing or rales.  Abdominal: Soft and non-distended.  There is no tenderness.  No rebound, guarding, or rigidity.   Musculoskeletal: Moves all extremities.  Patient has tenderness right underneath both breasts regions.  There is no infection or cellulitis, there is no open wounds.  Bilateral breast exam with no masses noted, no nipple discharge or puckering noted.  No lymph adenopathy along the breast and axilla.      No obvious deformities. No edema.  No tenderness under T or L spine and no tenderness to the the sacral iliac jointspace.  Full range of motion of left hip and knee with no complication at all she is good internal external rotation.    Good flexion and extension of the right hip and knee however she does have  Lateral right knee tenderness and pain with flexion and extension, there is  crepitus noted when with flexion and extension.   No cellulitis or warmth, there is no knee effusion noted.  Both legs are warm to touch with good 2+ dorsalis pedis pulses  Skin: Warm and dry. No rash noted.  Neurological:  Alert, awake, and appropriate.  Normal speech.  No acute focal neurological deficits are appreciated.  Psychiatric: Normal affect. Good eye contact. Appropriate in content.     ED Course   Procedures  ED Vital Signs:  Vitals:    01/08/23 2347 01/09/23 0024 01/09/23 0255 01/09/23 0400   BP: (!) 191/114 (!) 164/105 (!) 158/93 (!) 143/71   Pulse: 93 85 73 67   Resp: 18 20 18 19   Temp: 98.3 °F (36.8 °C)      TempSrc: Oral      SpO2: 100% 100% 99% 98%   Weight: 105.7 kg (233 lb 0.4 oz)      Height: 5' 2" (1.575 m)       01/09/23 0514   BP: (!) 151/67   Pulse: 66   Resp: 18   Temp: 98.7 °F (37.1 °C)   TempSrc:    SpO2: 99%   Weight:    Height:        Abnormal Lab " Results:  Labs Reviewed   CBC W/ AUTO DIFFERENTIAL - Abnormal; Notable for the following components:       Result Value    WBC 13.79 (*)     Hematocrit 36.5 (*)     RDW 14.9 (*)     Gran # (ANC) 9.4 (*)     Immature Grans (Abs) 0.05 (*)     Mono # 1.4 (*)     Platelet Estimate Clumped (*)     All other components within normal limits   COMPREHENSIVE METABOLIC PANEL - Abnormal; Notable for the following components:    CO2 22 (*)     Glucose 123 (*)     ALT 8 (*)     All other components within normal limits   LIPASE - Abnormal; Notable for the following components:    Lipase 253 (*)     All other components within normal limits   URINALYSIS, REFLEX TO URINE CULTURE - Abnormal; Notable for the following components:    Color, UA Colorless (*)     All other components within normal limits    Narrative:     Specimen Source->Urine   HIV 1 / 2 ANTIBODY    Narrative:     Release to patient->Immediate   HEPATITIS C ANTIBODY    Narrative:     Release to patient->Immediate   HEP C VIRUS HOLD SPECIMEN    Narrative:     Release to patient->Immediate   TROPONIN I   B-TYPE NATRIURETIC PEPTIDE   TROPONIN I        All Lab Results:  Results for orders placed or performed during the hospital encounter of 01/09/23   HIV 1/2 Ag/Ab (4th Gen)   Result Value Ref Range    HIV 1/2 Ag/Ab Negative Negative   Hepatitis C Antibody   Result Value Ref Range    Hepatitis C Ab Negative Negative   HCV Virus Hold Specimen   Result Value Ref Range    HEP C Virus Hold Specimen Hold for HCV sendout    CBC auto differential   Result Value Ref Range    WBC 13.79 (H) 3.90 - 12.70 K/uL    RBC 4.25 4.00 - 5.40 M/uL    Hemoglobin 12.4 12.0 - 16.0 g/dL    Hematocrit 36.5 (L) 37.0 - 48.5 %    MCV 86 82 - 98 fL    MCH 29.2 27.0 - 31.0 pg    MCHC 34.0 32.0 - 36.0 g/dL    RDW 14.9 (H) 11.5 - 14.5 %    Platelets 212 150 - 450 K/uL    MPV 11.8 9.2 - 12.9 fL    Immature Granulocytes 0.4 0.0 - 0.5 %    Gran # (ANC) 9.4 (H) 1.8 - 7.7 K/uL    Immature Grans (Abs) 0.05  (H) 0.00 - 0.04 K/uL    Lymph # 2.7 1.0 - 4.8 K/uL    Mono # 1.4 (H) 0.3 - 1.0 K/uL    Eos # 0.3 0.0 - 0.5 K/uL    Baso # 0.07 0.00 - 0.20 K/uL    nRBC 0 0 /100 WBC    Gran % 68.1 38.0 - 73.0 %    Lymph % 19.4 18.0 - 48.0 %    Mono % 9.8 4.0 - 15.0 %    Eosinophil % 1.8 0.0 - 8.0 %    Basophil % 0.5 0.0 - 1.9 %    Platelet Estimate Clumped (A)     Poik Slight     Hypo Occasional     Ovalocytes Occasional     Large/Giant Platelets Absent     Differential Method Automated    Comprehensive metabolic panel   Result Value Ref Range    Sodium 138 136 - 145 mmol/L    Potassium 3.5 3.5 - 5.1 mmol/L    Chloride 106 95 - 110 mmol/L    CO2 22 (L) 23 - 29 mmol/L    Glucose 123 (H) 70 - 110 mg/dL    BUN 11 6 - 20 mg/dL    Creatinine 0.7 0.5 - 1.4 mg/dL    Calcium 9.7 8.7 - 10.5 mg/dL    Total Protein 6.8 6.0 - 8.4 g/dL    Albumin 3.5 3.5 - 5.2 g/dL    Total Bilirubin 0.2 0.1 - 1.0 mg/dL    Alkaline Phosphatase 88 55 - 135 U/L    AST 11 10 - 40 U/L    ALT 8 (L) 10 - 44 U/L    Anion Gap 10 8 - 16 mmol/L    eGFR >60 >60 mL/min/1.73 m^2   Troponin I #1   Result Value Ref Range    Troponin I <0.006 0.000 - 0.026 ng/mL   BNP   Result Value Ref Range    BNP 48 0 - 99 pg/mL   Lipase   Result Value Ref Range    Lipase 253 (H) 4 - 60 U/L   Urinalysis, Reflex to Urine Culture Urine, Clean Catch    Specimen: Urine   Result Value Ref Range    Specimen UA Urine, Clean Catch     Color, UA Colorless (A) Yellow, Straw, Lillie    Appearance, UA Clear Clear    pH, UA 7.0 5.0 - 8.0    Specific Gravity, UA 1.015 1.005 - 1.030    Protein, UA Negative Negative    Glucose, UA Negative Negative    Ketones, UA Negative Negative    Bilirubin (UA) Negative Negative    Occult Blood UA Negative Negative    Nitrite, UA Negative Negative    Urobilinogen, UA Negative <2.0 EU/dL    Leukocytes, UA Negative Negative   Troponin I #2   Result Value Ref Range    Troponin I <0.006 0.000 - 0.026 ng/mL         Imaging Results:  Imaging Results              CT Abdomen  Pelvis With Contrast (In process)                      X-Ray Knee 3 View Right (Preliminary result)  Result time 01/09/23 03:27:32      ED Interpretation by Noni Bhandari Do, MD (01/09/23 03:27:32, [facility])    DJD of knee, decrease disc space medially                                     X-Ray Hip 2 or 3 views Right (with Pelvis when performed) (Preliminary result)  Result time 01/09/23 03:28:03      ED Interpretation by Noni Bhandari Do, MD (01/09/23 03:28:03, [facility])    No acute findings                                      X-Ray Lumbar Spine Ap And Lateral (Preliminary result)  Result time 01/09/23 03:28:34      ED Interpretation by Noni Bhandari Do, MD (01/09/23 03:28:34, [facility])    Bone spurs noted but and minimal djd                                     X-Ray Chest AP Portable (Preliminary result)  Result time 01/09/23 03:29:00      ED Interpretation by Noni Bhandari Do, MD (01/09/23 03:29:00, [facility])    .normal                                               The EKG was ordered, reviewed, and independently interpreted by the ED provider.  Interpretation time: 23:50  Rate: 84 BPM  Rhythm: normal sinus rhythm  Interpretation: Nonspecific T wave abnormality. Abnormal ECG. No STEMI.  When compared to EKG performed on 30-JUL-2022 16:18, there are no significant changes.             The Emergency Provider reviewed the vital signs and test results, which are outlined above.     ED Discussion       5:14 AM: Reassessed pt at this time.  Discussed with pt all pertinent ED information and results. Discussed pt dx and plan of tx. Gave pt all f/u and return to the ED instructions. All questions and concerns were addressed at this time. Pt expresses understanding of information and instructions, and is comfortable with plan to discharge. Pt is stable for discharge.    I  discussed with patient and/or family/caretaker that evaluation in the ED does not suggest any emergent or life threatening medical conditions requiring immediate intervention beyond what was provided in the ED, and I believe patient is safe for discharge.  Regardless, an unremarkable evaluation in the ED does not preclude the development or presence of a serious of life threatening condition. As such, patient was instructed to return immediately for any worsening or change in current symptoms.         Medical Decision Making:   Initial Assessment:   Patient with left-sided chest pain radiating all the way to the right mainly underneath bilateral breasts.  Been going on for a few days.  And getting worse.  It does radiate into her nipples.  It does not seem to radiate into her shoulder arm neck or jaw.  She also notes right hip and knee pain.  That has been going on for almost 6 months.  She reports that is very difficult for her to walk due to the pain.  It seems to be more laterally .  She reports occasional paresthesias and occasional numbness.    Plan to work her up for her chest pain she is 52 with high blood pressure and she does smoke.  I suspect that the pain to the right side is either from her hip or her knee.  The patient is 105 lb.  She is crepitus to her right knee and she hurts when she flexes and extends her right knee.  I do not suspect any infection at this time.  Differential Diagnosis:   Differential diagnoses:  Heart attack, angina, pneumonia, asthma, infection, aortic dissection, pneumothorax, pericarditis , pleural effusion, GERD, pancreatitis, gallstone pain, cholelithiasis or cholecystitis, esophageal rupture, bowel perforation, gastritis, nonspecific chest pain, musculoskeletal chest pain, costochondritis    Independently Interpreted Test(s):   I have ordered and independently interpreted EKG Reading(s) - see summary below       <> Summary of EKG Reading(s):   The EKG was ordered, reviewed,  and independently interpreted by the ED provider.  Interpretation time: 23:50  Rate: 84 BPM  Rhythm: normal sinus rhythm  Interpretation: Nonspecific T wave abnormality. Abnormal ECG. No STEMI.      Clinical Tests:   Lab Tests: Ordered and Reviewed  The following lab test(s) were unremarkable: CBC, CMP, BNP, Urinalysis and Troponin       <> Summary of Lab: CBC with wbc mildly elevated at 13 but no anemia, her CMP with good renal function and normal electrolytes except for elevated lipase.  Her troponin was normal and not consistent with MI. BNP was also normal.  Her UA did not show any urinary tract infection or hematuria to suggest renal stone.   Radiological Study: Ordered and Reviewed  Medical Tests: Ordered and Reviewed  ED Management:  CT abdomen pelvis did not show inch any intra-abdominal pathology.  There was no bowel obstruction or kidney stone.  There was no appendicitis.  Or pancreatitis.  X-ray that lumbar spine and right hip showed no acute pathology she has some mild bone spurs on the lumbar spine but good spacing.  Her right knee shows some degenerative joint disease medially.  There is disc space narrowing between her bones.    Patient did well in the emergency room per pressure did came down with pain control.  She did get Toradol and Tylenol in the emergency room she did not need to get any blood pressure medication.  I suspect her right hip pain is referred pain for her right knee.  She has overt crepitus.      Shared decision-making with the patient.  She realizes that she does need to lose weight.  She does have wearing her knee with a crepitus.  And I think her pain is coming from the knee and radiating up to her hip.  As far as her chest pain go there is no signs of heart attack at this time.  Will refer her to Cardiology since she is 53 and she does have high blood pressure and she does smoke.  Her blood pressure is stable at this time at 143/71.  She will continue to follow up with the  primary care doctor for further management as needed.  Plan to discharge her with anti-inflammatories naproxen, I will also give her few Norco for breakthrough pain.  We talked about a Ace wrap for her knee however she reports when she uses an Ace wrap it actually makes her knee hurt worse.  Will also refer to Orthopedics for her knee pain.         ED Medication(s):  Medications   ketorolac injection 15 mg (15 mg Intravenous Given 1/9/23 0158)   acetaminophen tablet 1,000 mg (1,000 mg Oral Given 1/9/23 0158)   iohexoL (OMNIPAQUE 350) injection 100 mL (100 mLs Intravenous Given 1/9/23 0223)       Discharge Medication List as of 1/9/2023  5:07 AM        START taking these medications    Details   naproxen (NAPROSYN) 500 MG tablet Take 1 tablet (500 mg total) by mouth 2 (two) times daily with meals., Starting Mon 1/9/2023, Normal              Follow-up Information       Anjelica Laguerre DO In 1 day.    Specialty: Internal Medicine  Contact information:  [address] [place] [address] [address] [place] [id number] [phone]                                 Scribe Attestation:   Scribe #1: I performed the above scribed service and the documentation accurately describes the services I performed. I attest to the accuracy of the note.     Attending:   Physician Attestation Statement for Scribe #1: I, Noni Bhandari Do, MD, personally performed the services described in this documentation, as scribed by Kelsea Coello, in my presence, and it is both accurate and complete.           Clinical Impression       ICD-10-CM ICD-9-CM   1. Chest pain, unspecified type  R07.9 786.50   2. Chest pain  R07.9 786.50   3. Pain  R52 780.96   4. Primary osteoarthritis of right knee  M17.11 715.16       Disposition:   Disposition: Discharged  Condition: Stable       Noni Bhandari Do, MD  01/09/23 0608

## 2023-01-10 ENCOUNTER — PATIENT OUTREACH (OUTPATIENT)
Dept: ADMINISTRATIVE | Facility: HOSPITAL | Age: 53
End: 2023-01-10
Payer: COMMERCIAL

## 2023-01-10 ENCOUNTER — CLINICAL SUPPORT (OUTPATIENT)
Dept: REHABILITATION | Facility: HOSPITAL | Age: 53
End: 2023-01-10
Payer: COMMERCIAL

## 2023-01-10 DIAGNOSIS — M54.16 LUMBAR RADICULOPATHY: ICD-10-CM

## 2023-01-10 DIAGNOSIS — M25.559 HIP PAIN: Primary | ICD-10-CM

## 2023-01-10 PROCEDURE — 97110 THERAPEUTIC EXERCISES: CPT | Mod: PN

## 2023-01-10 PROCEDURE — 97161 PT EVAL LOW COMPLEX 20 MIN: CPT | Mod: PN

## 2023-01-11 PROBLEM — M25.559 HIP PAIN: Status: ACTIVE | Noted: 2023-01-11

## 2023-01-12 NOTE — PLAN OF CARE
"[facility]   Physical Therapy Initial Evaluation     Date: 1/10/2023   Name: Latonya Mendoza  Clinic Number: [id number]    Therapy Diagnosis:   Encounter Diagnoses   Name Primary?    Lumbar radiculopathy     Hip pain Yes     Physician: Anjelica Laguerre DO    Physician Orders: PT Eval and Treat   Medical Diagnosis from Referral: Lumbar radiculopathy  Evaluation Date: 1/10/2023  Authorization Period Expiration: 2024  Plan of Care Expiration: 2023  Progress Note Due: 2023  Visit # / Visits authorized:      FOTO 18 visits recomended  Number Date Score    1 01/10/2023 99%   2     3     4        Precautions: Diabetes    Time In: 821  Time Out: 905  Total Appointment Time (timed & untimed codes): 44 minutes      SUBJECTIVE   Date of onset: 2023    History of current condition - Latonya reports: right hip pain started first.  She feels if the right hip is higher and feels like she is sitting on something.  Currently the pain is from the "butt down".    Falls: None reported    Imaging:  See EPIC    Prior Therapy: None  Social History:  lives with their daughter  Occupation: Teacher  Prior Level of Function: independent  Current Level of Function: hip pain with pain in transfers and walking    Pain:  Current 5/10, worst 10/10, best 5/10   Location: right back , buttocks , and knee    Description: Sharp, Shooting, and Stabbing  Aggravating Factors: "Everything"  Easing Factors: Heating pad    Patients goals:   [x] Decrease pain  [] Increase strength  [x] Increase range of motion   [] Return to work  [] Increase endurance  [x] Return to prior level of function  [] Return to sports  [] Other: N/A      Medical History:   Past Medical History:   Diagnosis Date    Hypertension        Surgical History:   Latonya Mendoza  has a past surgical history that includes  section (2008); Dilation and curettage of uterus (2018); and incision and drainage, neck " (Left, 7/29/2022).    Medications:   Latonya has a current medication list which includes the following prescription(s): amlodipine, aspirin, excedrin extra strength, bacitracin, ergocalciferol, hydrocodone-acetaminophen, losartan, and naproxen.    Allergies:   Review of patient's allergies indicates:  No Known Allergies       OBJECTIVE     Sensation:  Sensation is intact to light touch    Structural Inspection:     ROM   %    Lumbar Forward Bending knees ---   Lumbar Backward Bending  100 ---    Right (degrees) Left (degrees)   Hip Flexion 100 100   Hip Extension  10 10     Strength   Right  Left   Gluteus Kimani 3+/5 3+/5   Gluteus Medius 3+/5 3+/5   Hip Adductors 3+/5 3+/5   Psoas 3/5 4/5   Quadriceps 5/5 5/5   Hamstrings 5/5 5/5     Special Tests:   Test Right Left   SLR Test negative negative   SLUMP  negative negative     Palpation: Tenderness right gluteal area      Movement Analysis: Bilateral hamstring tightness and restricted hip flexion     Gait Analysis: The patient ambulated without the use of device and presents with the following gait abnormalities: decreased stance time on right    Other: Pt presents with postural abnormalities which include: increased lumbar lordosis, and hip shift to left    TREATMENT     Total Treatment time (time-based codes) separate from Evaluation: 10 minutes      Latonya received the treatments listed below:       THERAPEUTIC EXERCISES to develop strength and ROM for 10 minutes including :   Piriformis stretch seated and supine  Double knee to chest with her own therapy ball  Clamshells    PATIENT EDUCATION AND HOME EXERCISES     Education provided:   - Home program    Written Home Exercises Provided: yes. Exercises were reviewed and Latonya was able to demonstrate them prior to the end of the session.  Latonya demonstrated good  understanding of the education provided. See EMR under Patient Instructions for exercises provided during therapy sessions.    ASSESSMENT      Latonya is a 52 y.o. female referred to outpatient Physical Therapy with a medical diagnosis of lumbar radiculopathy. Patient presents with :  Right hip pain  Decrease hip flexion  Bilateral hip weakness  Impaired gait and posture    Patient prognosis is Good.   Patientt will benefit from skilled outpatient Physical Therapy to address the deficits stated above and in the chart below, provide patient /family education, and to maximize patientt's level of independence.     Plan of care discussed with patient: Yes  Patient's spiritual, cultural and educational needs considered and patient is agreeable to the plan of care and goals as stated below:     Anticipated Barriers for therapy: None    Medical Necessity is demonstrated by the following  History  Co-morbidities and personal factors that may impact the plan of care Co-morbidities:   See above    Personal Factors:   no deficits     low   Examination  Body Structures and Functions, activity limitations and participation restrictions that may impact the plan of care Body Regions:   hips    Body Systems:    ROM  strength  gait    Participation Restrictions:   none    Activity limitations:   Learning and applying knowledge  no deficits    General Tasks and Commands  no deficits    Communication  no deficits    Mobility  lifting and carrying objects  walking  driving (bike, car, motorcycle)    Self care  washing oneself (bathing, drying, washing hands)  caring for body parts (brushing teeth, shaving, grooming)  looking after one's health    Domestic Life  shopping  cooking  doing house work (cleaning house, washing dishes, laundry)  assisting others    Interactions/Relationships  no deficits    Life Areas  no deficits    Community and Social Life  community life  recreation and leisure         low   Clinical Presentation stable and uncomplicated low   Decision Making/ Complexity Score: low     Goals:  Short Term Goals: In 3 weeks   1. I with HEP  2. Pt to increase  lumbar ROM from knees to mid shin    3. Pt to have pain less than 5/10 at all times.  4. Pt will improve FOTO disability score to 80% disability or less in order to improve overall QOL and return to PLOF.      Long Term Goals: In 6 weeks  1.  Pt will improve FOTO disability score to 60% disability or less in order to improve overall QOL and return to PLOF.    2.  Patient to demo increase in LE strength to 4/5  3.  Patient to have decreased pain to 3/10 at all times.  4.  Patient to perform daily activities including walking without limitation.    PLAN   Plan of care Certification: 1/10/2023 to 03/11/2023.    Outpatient Physical Therapy 2 times weekly for 6 weeks to include the following interventions: Electrical Stimulation to the right gluteal or with DN, Manual Therapy, Moist Heat/ Ice, Patient Education, Therapeutic Activities, Therapeutic Exercise, and Dry needling.     Jarad Freed, PT      I CERTIFY THE NEED FOR THESE SERVICES FURNISHED UNDER THIS PLAN OF TREATMENT AND WHILE UNDER MY CARE   Physician's comments:     Physician's Signature: ___________________________________________________

## 2023-01-13 ENCOUNTER — CLINICAL SUPPORT (OUTPATIENT)
Dept: REHABILITATION | Facility: HOSPITAL | Age: 53
End: 2023-01-13
Payer: COMMERCIAL

## 2023-01-13 ENCOUNTER — PATIENT MESSAGE (OUTPATIENT)
Dept: ADMINISTRATIVE | Facility: HOSPITAL | Age: 53
End: 2023-01-13
Payer: COMMERCIAL

## 2023-01-13 DIAGNOSIS — M25.559 HIP PAIN: Primary | ICD-10-CM

## 2023-01-13 PROCEDURE — 97110 THERAPEUTIC EXERCISES: CPT | Mod: PN

## 2023-01-15 NOTE — PROGRESS NOTES
[facility]   Physical Therapy Treatment Note     Name: Latonya Braga [facility]  Clinic Number: [id number]    Therapy Diagnosis:   Encounter Diagnosis   Name Primary?    Hip pain Yes     Physician: Anjelica Laguerre DO    Visit Date: 1/13/2023    Physician Orders: PT Eval and Treat   Medical Diagnosis from Referral: Lumbar radiculopathy  Evaluation Date: 1/10/2023  Authorization Period Expiration: 01/09/2024  Plan of Care Expiration: 03/11/2023  Progress Note Due: 02/09/2023  Visit # / Visits authorized: 1/1      FOTO 18 visits recomended  Number Date Score    1 01/10/2023 99%   2       3       4          Precautions: Diabetes    PTA Visit #: 0/5     Time In: 1100  Time Out: 1145  Total Billable Time: 45 minutes    SUBJECTIVE     Pt reports: Back pain.  She was compliant with home exercise program.  Response to previous treatment: N/A  Functional change: N/A    Pain: 5/10  Location:  right back , buttocks , and knee      OBJECTIVE     Objective Measures updated at progress report unless specified.     Treatment     Latonya received the treatments listed below:      therapeutic exercises to develop strength, ROM, and core stabilization for 40 minutes including:  Exercises  Details   NuStep x    Ball roll out x    Heel slides with strap x    Manual piriformis stretch x    DKTC x    LTR x    Bridges x    Clamshells x                                  Patient Education and Home Exercises     Home Exercises Provided and Patient Education Provided     Education provided:   - Home program review     Written Home Exercises Provided: Patient instructed to cont prior HEP. Exercises were reviewed and Latonya was able to demonstrate them prior to the end of the session.  Latonya demonstrated good  understanding of the education provided. See EMR under Patient Instructions for exercises provided during therapy sessions    ASSESSMENT     Latonya is a 52 y.o. female referred to outpatient Physical Therapy with  a medical diagnosis of lumbar radiculopathy. Patient presents with :  Right hip pain  Decrease hip flexion  Bilateral hip weakness  Impaired gait and posture  The patient was instructed in and performed core strengthening and mobility.    Latonya Is progressing well towards her goals.   Pt prognosis is Good.     Pt will continue to benefit from skilled outpatient physical therapy to address the deficits listed in the problem list box on initial evaluation, provide pt/family education and to maximize pt's level of independence in the home and community environment.     Pt's spiritual, cultural and educational needs considered and pt agreeable to plan of care and goals.     Anticipated barriers to physical therapy: None    Goals:   Short Term Goals: In 3 weeks   1. I with HEP  2. Pt to increase lumbar ROM from knees to mid shin    3. Pt to have pain less than 5/10 at all times.  4. Pt will improve FOTO disability score to 80% disability or less in order to improve overall QOL and return to PLOF.       Long Term Goals: In 6 weeks  1.  Pt will improve FOTO disability score to 60% disability or less in order to improve overall QOL and return to PLOF.    2.  Patient to demo increase in LE strength to 4/5  3.  Patient to have decreased pain to 3/10 at all times.  4.  Patient to perform daily activities including walking without limitation.    PLAN     Plan of care Certification: 1/10/2023 to 03/11/2023.     Outpatient Physical Therapy 2 times weekly for 6 weeks to include the following interventions: Electrical Stimulation to the right gluteal or with DN, Manual Therapy, Moist Heat/ Ice, Patient Education, Therapeutic Activities, Therapeutic Exercise, and Dry needling.     Jarad Freed, PT

## 2023-01-17 ENCOUNTER — OFFICE VISIT (OUTPATIENT)
Dept: OBSTETRICS AND GYNECOLOGY | Facility: CLINIC | Age: 53
End: 2023-01-17
Payer: COMMERCIAL

## 2023-01-17 VITALS
BODY MASS INDEX: 43.77 KG/M2 | WEIGHT: 237.88 LBS | DIASTOLIC BLOOD PRESSURE: 90 MMHG | HEIGHT: 62 IN | SYSTOLIC BLOOD PRESSURE: 138 MMHG

## 2023-01-17 DIAGNOSIS — R30.0 DYSURIA: ICD-10-CM

## 2023-01-17 DIAGNOSIS — Z12.31 SCREENING MAMMOGRAM, ENCOUNTER FOR: ICD-10-CM

## 2023-01-17 DIAGNOSIS — Z01.419 ENCOUNTER FOR GYNECOLOGICAL EXAMINATION (GENERAL) (ROUTINE) WITHOUT ABNORMAL FINDINGS: Primary | ICD-10-CM

## 2023-01-17 DIAGNOSIS — Z11.3 SCREENING EXAMINATION FOR STD (SEXUALLY TRANSMITTED DISEASE): ICD-10-CM

## 2023-01-17 DIAGNOSIS — Z12.4 SCREENING FOR CERVICAL CANCER: ICD-10-CM

## 2023-01-17 PROCEDURE — 3080F PR MOST RECENT DIASTOLIC BLOOD PRESSURE >= 90 MM HG: ICD-10-PCS | Mod: CPTII,S$GLB,, | Performed by: OBSTETRICS & GYNECOLOGY

## 2023-01-17 PROCEDURE — 99999 PR PBB SHADOW E&M-EST. PATIENT-LVL III: ICD-10-PCS | Mod: PBBFAC,,, | Performed by: OBSTETRICS & GYNECOLOGY

## 2023-01-17 PROCEDURE — 3075F SYST BP GE 130 - 139MM HG: CPT | Mod: CPTII,S$GLB,, | Performed by: OBSTETRICS & GYNECOLOGY

## 2023-01-17 PROCEDURE — 87086 URINE CULTURE/COLONY COUNT: CPT | Performed by: OBSTETRICS & GYNECOLOGY

## 2023-01-17 PROCEDURE — 99999 PR PBB SHADOW E&M-EST. PATIENT-LVL III: CPT | Mod: PBBFAC,,, | Performed by: OBSTETRICS & GYNECOLOGY

## 2023-01-17 PROCEDURE — 99396 PREV VISIT EST AGE 40-64: CPT | Mod: S$GLB,,, | Performed by: OBSTETRICS & GYNECOLOGY

## 2023-01-17 PROCEDURE — 3008F BODY MASS INDEX DOCD: CPT | Mod: CPTII,S$GLB,, | Performed by: OBSTETRICS & GYNECOLOGY

## 2023-01-17 PROCEDURE — 1159F MED LIST DOCD IN RCRD: CPT | Mod: CPTII,S$GLB,, | Performed by: OBSTETRICS & GYNECOLOGY

## 2023-01-17 PROCEDURE — 87591 N.GONORRHOEAE DNA AMP PROB: CPT | Performed by: OBSTETRICS & GYNECOLOGY

## 2023-01-17 PROCEDURE — 87624 HPV HI-RISK TYP POOLED RSLT: CPT | Performed by: OBSTETRICS & GYNECOLOGY

## 2023-01-17 PROCEDURE — 3080F DIAST BP >= 90 MM HG: CPT | Mod: CPTII,S$GLB,, | Performed by: OBSTETRICS & GYNECOLOGY

## 2023-01-17 PROCEDURE — 87491 CHLMYD TRACH DNA AMP PROBE: CPT | Performed by: OBSTETRICS & GYNECOLOGY

## 2023-01-17 PROCEDURE — 3075F PR MOST RECENT SYSTOLIC BLOOD PRESS GE 130-139MM HG: ICD-10-PCS | Mod: CPTII,S$GLB,, | Performed by: OBSTETRICS & GYNECOLOGY

## 2023-01-17 PROCEDURE — 3008F PR BODY MASS INDEX (BMI) DOCUMENTED: ICD-10-PCS | Mod: CPTII,S$GLB,, | Performed by: OBSTETRICS & GYNECOLOGY

## 2023-01-17 PROCEDURE — 1159F PR MEDICATION LIST DOCUMENTED IN MEDICAL RECORD: ICD-10-PCS | Mod: CPTII,S$GLB,, | Performed by: OBSTETRICS & GYNECOLOGY

## 2023-01-17 PROCEDURE — 81514 NFCT DS BV&VAGINITIS DNA ALG: CPT | Performed by: OBSTETRICS & GYNECOLOGY

## 2023-01-17 PROCEDURE — 88175 CYTOPATH C/V AUTO FLUID REDO: CPT | Performed by: OBSTETRICS & GYNECOLOGY

## 2023-01-17 PROCEDURE — 4010F ACE/ARB THERAPY RXD/TAKEN: CPT | Mod: CPTII,S$GLB,, | Performed by: OBSTETRICS & GYNECOLOGY

## 2023-01-17 PROCEDURE — 99396 PR PREVENTIVE VISIT,EST,40-64: ICD-10-PCS | Mod: S$GLB,,, | Performed by: OBSTETRICS & GYNECOLOGY

## 2023-01-17 PROCEDURE — 4010F PR ACE/ARB THEARPY RXD/TAKEN: ICD-10-PCS | Mod: CPTII,S$GLB,, | Performed by: OBSTETRICS & GYNECOLOGY

## 2023-01-17 NOTE — PROGRESS NOTES
Subjective:       Patient ID: Latonya Mendoza is a 52 y.o. female.    Chief Complaint:  Well Woman      History of Present Illness  HPI  Annual Exam-Premenopausal  Patient presents for annual exam. The patient has no complaints today.--wants std testing;  The patient is sexually active.--denies pain, denies vaginal dryenss;  GYN screening history: last pap: approximate date  and was normal and last mammogram: approximate date 10/22 and was normal. The patient wears seatbelts: yes. The patient participates in regular exercise: no. --limited by sciatic pain; Has the patient ever been transfused or tattooed?: no. The patient reports that there is not domestic violence in her life.--  Menses irreg within the last few months; peviously q 28 days; skipped menses in December and ; flow usually 3 days; heavy on 2nd day;--pads-super change q 3-4 hrs (want to); denies menstrual cramps;   No problems sleeping  Occas leak with cough/sneeze  Denies hot flushes    Reports pelvic pain that radiates from right buttock to right anterior only when sitting to urinate--being followed by PT for sciatica and piriformis muscle strain    GYN & OB History  Patient's last menstrual period was 2022 (exact date).   Date of Last Pap: 2023    OB History    Para Term  AB Living   8 1 1   7 1   SAB IAB Ectopic Multiple Live Births   7       1      # Outcome Date GA Lbr Dominick/2nd Weight Sex Delivery Anes PTL Lv   8  2018           7 SAB            6 SAB            5 SAB            4 SAB            3 SAB            2 SAB            1 Term      CS-LTranv   NEAL       Review of Systems  Review of Systems   Genitourinary:  Positive for menstrual problem.   All other systems reviewed and are negative.        Objective:      Physical Exam:   Constitutional: She is oriented to person, place, and time. She appears well-developed and well-nourished.     Eyes: Pupils are equal, round, and reactive to light. Conjunctivae  and EOM are normal.      Pulmonary/Chest: Effort normal. Right breast exhibits no mass, no nipple discharge, no skin change and no tenderness. Left breast exhibits no mass, no nipple discharge, no skin change and no tenderness. Breasts are symmetrical.        Abdominal: Soft.     Genitourinary:    Vagina, uterus, right adnexa, left adnexa and rectum normal.      Pelvic exam was performed with patient supine.   The external female genitalia was normal.   Labial bartholins normal.Cervix is normal. Right adnexum displays no mass and no tenderness. Left adnexum displays no mass and no tenderness. No erythema,  no vaginal discharge, bleeding, rectocele, cystocele or unspecified prolapse of vaginal walls in the vagina. Vagina was moist.   pap smear completedUerus contour normal  Normal urethral meatus.Urethra findings: no urethral massBladder findings: no bladder distention and no bladder tenderness          Musculoskeletal: Normal range of motion and moves all extremeties.       Neurological: She is alert and oriented to person, place, and time.    Skin: Skin is warm.    Psychiatric: She has a normal mood and affect. Her behavior is normal.         Assessment:        1. Encounter for gynecological examination (general) (routine) without abnormal findings    2. Screening mammogram, encounter for    3. Screening for cervical cancer    4. Dysuria    5. Screening examination for STD (sexually transmitted disease)               Plan:      Continue annual well woman exam.  Pap today; Reviewed updated recommendations for pap smears (every 3 years) in low risk patients.   Recommend annual pelvic exams.  Reviewed recommendations for annual CBE.  Gc/ct affirm per pt request  Continue menstrual calenar; aware menopause if no menses for 12 months  Continue diet, exercise, weight loss   Ucx today

## 2023-01-18 LAB
BACTERIAL VAGINOSIS DNA: NEGATIVE
C TRACH DNA SPEC QL NAA+PROBE: NOT DETECTED
CANDIDA GLABRATA DNA: NEGATIVE
CANDIDA KRUSEI DNA: NEGATIVE
CANDIDA RRNA VAG QL PROBE: NEGATIVE
N GONORRHOEA DNA SPEC QL NAA+PROBE: NOT DETECTED
T VAGINALIS RRNA GENITAL QL PROBE: NEGATIVE

## 2023-01-18 NOTE — PROGRESS NOTES
[facility]   Physical Therapist Assistant Treatment Note     Name: Latonya Braga [facility]  Clinic Number: [id number]    Therapy Diagnosis:   Encounter Diagnosis   Name Primary?    Hip pain Yes       Physician: Anjelica Laguerre DO    Visit Date: 1/19/2023    Physician Orders: PT Eval and Treat   Medical Diagnosis from Referral: Lumbar radiculopathy  Evaluation Date: 1/10/2023  Authorization Period Expiration: 12/31/23  Plan of Care Expiration: 03/11/2023  Progress Note Due: 02/09/2023    Visit # / Visits authorized: 2/20  +eval     FOTO 18 visits recomended  Number Date Score    1 01/10/2023 99%   2       3       4          Precautions: Diabetes, Standard    PTA Visit #: 1/5     Time In:3:15  Time Out: 4:05  Total Billable Time: 45 minutes    SUBJECTIVE     Pt reports: moderate pain today; sitting increases the pain  She was compliant with home exercise program.  Response to previous treatment: no issues  Functional change: N/A at this time    Pain: 5/10  Location:  right back , buttocks , and knee      OBJECTIVE     Objective Measures updated at progress report unless specified.     Treatment     Latonya received the treatments listed below:      therapeutic exercises to develop strength, ROM, and core stabilization for 35 minutes including:  Exercises  Details   NuStep or Bike x Bike   Ball roll outs x Forward   Heel slides with strap     Piriformis stretch x Supine    DKTC w/towel pull x 2min   LTR or Wind shield wipers x Wind shield wipers   Dynamic HSS w/neural glide B x Leg over red swiss ball for tolerance--neural tension in RLE   Bridges x Over red swiss ball   Clamshells     Prone press ups x                           Manual therapy x10 min to low back/right glute/right hamstring    Patient Education and Home Exercises     Home Exercises Provided and Patient Education Provided     Education provided:   - Home program review   -importance of core strength    Written Home Exercises  Provided: Patient instructed to cont prior HEP. Exercises were reviewed and Latonya was able to demonstrate them prior to the end of the session.  Latonya demonstrated good  understanding of the education provided. See EMR under Patient Instructions for exercises provided during therapy sessions    ASSESSMENT     Latonya presented to therapy with moderate pain in her low back/right glutes down to her knee. She has significant neural tension down her right leg. Manual performed to decrease tissue tension for improved mobility.  Focus today on mobility with glute strengthening. Glute bridges were painful however placing her legs over the swiss ball helped decrease the pain and improve tolerance. Verbal cueing was provided today for proper techniques and alignment. Continued skilled intervention indicated to decrease neural and tissue tension and improve mobility and strength for improved tolerance of daily activities.  Latonya Is progressing well towards her goals.   Pt prognosis is Good.     Pt will continue to benefit from skilled outpatient physical therapy to address the deficits listed in the problem list box on initial evaluation, provide pt/family education and to maximize pt's level of independence in the home and community environment.     Pt's spiritual, cultural and educational needs considered and pt agreeable to plan of care and goals.     Anticipated barriers to physical therapy: None    Goals:   Short Term Goals: In 3 weeks   1. I with HEP  2. Pt to increase lumbar ROM from knees to mid shin    3. Pt to have pain less than 5/10 at all times.  4. Pt will improve FOTO disability score to 80% disability or less in order to improve overall QOL and return to PLOF.       Long Term Goals: In 6 weeks  1.  Pt will improve FOTO disability score to 60% disability or less in order to improve overall QOL and return to PLOF.    2.  Patient to demo increase in LE strength to 4/5  3.  Patient to have decreased pain to  3/10 at all times.  4.  Patient to perform daily activities including walking without limitation.    PLAN     Plan of care Certification: 1/10/2023 to 03/11/2023.     Outpatient Physical Therapy 2 times weekly for 6 weeks to include the following interventions: Electrical Stimulation to the right gluteal or with DN, Manual Therapy, Moist Heat/ Ice, Patient Education, Therapeutic Activities, Therapeutic Exercise, and Dry needling.     Rhonda Ruffin, PTA

## 2023-01-19 ENCOUNTER — CLINICAL SUPPORT (OUTPATIENT)
Dept: REHABILITATION | Facility: HOSPITAL | Age: 53
End: 2023-01-19
Payer: COMMERCIAL

## 2023-01-19 DIAGNOSIS — M25.559 HIP PAIN: Primary | ICD-10-CM

## 2023-01-19 LAB — BACTERIA UR CULT: NORMAL

## 2023-01-19 PROCEDURE — 97110 THERAPEUTIC EXERCISES: CPT | Mod: PN,CQ

## 2023-01-25 NOTE — PROGRESS NOTES
Good News! Your pap smear came back and it was normal.  The HPV is also negative.   I still recommend doing a pelvic exam and annual visit every year, but you only need the pap test every 3 years. Please call me if you have any further questions.   Sincerely,   Dr. Navarro

## 2023-02-06 ENCOUNTER — OFFICE VISIT (OUTPATIENT)
Dept: INTERNAL MEDICINE | Facility: CLINIC | Age: 53
End: 2023-02-06
Payer: COMMERCIAL

## 2023-02-06 VITALS
RESPIRATION RATE: 19 BRPM | OXYGEN SATURATION: 100 % | DIASTOLIC BLOOD PRESSURE: 100 MMHG | BODY MASS INDEX: 43.08 KG/M2 | WEIGHT: 234.13 LBS | SYSTOLIC BLOOD PRESSURE: 160 MMHG | HEART RATE: 84 BPM | HEIGHT: 62 IN | TEMPERATURE: 97 F

## 2023-02-06 DIAGNOSIS — M54.16 LUMBAR RADICULOPATHY: ICD-10-CM

## 2023-02-06 DIAGNOSIS — I10 HYPERTENSION GOAL BP (BLOOD PRESSURE) < 140/90: Primary | ICD-10-CM

## 2023-02-06 PROCEDURE — 3077F PR MOST RECENT SYSTOLIC BLOOD PRESSURE >= 140 MM HG: ICD-10-PCS | Mod: CPTII,S$GLB,, | Performed by: PHYSICIAN ASSISTANT

## 2023-02-06 PROCEDURE — 1160F PR REVIEW ALL MEDS BY PRESCRIBER/CLIN PHARMACIST DOCUMENTED: ICD-10-PCS | Mod: CPTII,S$GLB,, | Performed by: PHYSICIAN ASSISTANT

## 2023-02-06 PROCEDURE — 4010F ACE/ARB THERAPY RXD/TAKEN: CPT | Mod: CPTII,S$GLB,, | Performed by: PHYSICIAN ASSISTANT

## 2023-02-06 PROCEDURE — 99214 OFFICE O/P EST MOD 30 MIN: CPT | Mod: S$GLB,,, | Performed by: PHYSICIAN ASSISTANT

## 2023-02-06 PROCEDURE — 4010F PR ACE/ARB THEARPY RXD/TAKEN: ICD-10-PCS | Mod: CPTII,S$GLB,, | Performed by: PHYSICIAN ASSISTANT

## 2023-02-06 PROCEDURE — 99999 PR PBB SHADOW E&M-EST. PATIENT-LVL IV: ICD-10-PCS | Mod: PBBFAC,,, | Performed by: PHYSICIAN ASSISTANT

## 2023-02-06 PROCEDURE — 3080F DIAST BP >= 90 MM HG: CPT | Mod: CPTII,S$GLB,, | Performed by: PHYSICIAN ASSISTANT

## 2023-02-06 PROCEDURE — 1160F RVW MEDS BY RX/DR IN RCRD: CPT | Mod: CPTII,S$GLB,, | Performed by: PHYSICIAN ASSISTANT

## 2023-02-06 PROCEDURE — 99999 PR PBB SHADOW E&M-EST. PATIENT-LVL IV: CPT | Mod: PBBFAC,,, | Performed by: PHYSICIAN ASSISTANT

## 2023-02-06 PROCEDURE — 1159F MED LIST DOCD IN RCRD: CPT | Mod: CPTII,S$GLB,, | Performed by: PHYSICIAN ASSISTANT

## 2023-02-06 PROCEDURE — 1159F PR MEDICATION LIST DOCUMENTED IN MEDICAL RECORD: ICD-10-PCS | Mod: CPTII,S$GLB,, | Performed by: PHYSICIAN ASSISTANT

## 2023-02-06 PROCEDURE — 3008F PR BODY MASS INDEX (BMI) DOCUMENTED: ICD-10-PCS | Mod: CPTII,S$GLB,, | Performed by: PHYSICIAN ASSISTANT

## 2023-02-06 PROCEDURE — 3080F PR MOST RECENT DIASTOLIC BLOOD PRESSURE >= 90 MM HG: ICD-10-PCS | Mod: CPTII,S$GLB,, | Performed by: PHYSICIAN ASSISTANT

## 2023-02-06 PROCEDURE — 3077F SYST BP >= 140 MM HG: CPT | Mod: CPTII,S$GLB,, | Performed by: PHYSICIAN ASSISTANT

## 2023-02-06 PROCEDURE — 3008F BODY MASS INDEX DOCD: CPT | Mod: CPTII,S$GLB,, | Performed by: PHYSICIAN ASSISTANT

## 2023-02-06 PROCEDURE — 99214 PR OFFICE/OUTPT VISIT, EST, LEVL IV, 30-39 MIN: ICD-10-PCS | Mod: S$GLB,,, | Performed by: PHYSICIAN ASSISTANT

## 2023-02-06 RX ORDER — LOSARTAN POTASSIUM 50 MG/1
50 TABLET ORAL DAILY
Qty: 90 TABLET | Refills: 1 | Status: SHIPPED | OUTPATIENT
Start: 2023-02-06 | End: 2023-07-31 | Stop reason: SDUPTHER

## 2023-02-06 RX ORDER — AMLODIPINE BESYLATE 10 MG/1
10 TABLET ORAL DAILY
Qty: 90 TABLET | Refills: 1 | Status: SHIPPED | OUTPATIENT
Start: 2023-02-06 | End: 2024-02-12 | Stop reason: SDUPTHER

## 2023-02-06 RX ORDER — TIZANIDINE 4 MG/1
4 TABLET ORAL NIGHTLY PRN
Qty: 10 TABLET | Refills: 0 | Status: SHIPPED | OUTPATIENT
Start: 2023-02-06 | End: 2023-02-16

## 2023-02-06 NOTE — PROGRESS NOTES
"Subjective:      Patient ID: Latonya Mendoza is a 52 y.o. female.    Chief Complaint: Follow-up    Patient is new to me, being seen today for BP f/u.     HTN- losartan 50mg, amlodipine 10mg   BP elevated high lately, out of medicine x1wk     Last visit Jan 2021 with PCP, lumbar radiculopathy at that time, Rx naproxen, referred to PT (completed 3 sessions but is costly and unsure whether she will be able to continue)  Overall feels about the same, trying to do exercises at home   Lumbar spine x-ray shows DDD    Pain originates in R lower back and radiates to buttocks and down legs   Pain has been for several months and gradually worsening     Review of Systems   Constitutional:  Negative for chills, diaphoresis and fever.   HENT:  Negative for congestion, rhinorrhea and sore throat.    Respiratory:  Negative for cough, shortness of breath and wheezing.    Cardiovascular:  Negative for leg swelling.   Gastrointestinal:  Negative for abdominal pain, constipation, diarrhea, nausea and vomiting.   Skin:  Negative for rash.   Neurological:  Negative for dizziness, light-headedness and headaches.     Objective:   BP (!) 160/100   Pulse 84   Temp 97.2 °F (36.2 °C)   Resp 19   Ht 5' 2" (1.575 m)   Wt 106.2 kg (234 lb 2.1 oz)   SpO2 100%   BMI 42.82 kg/m²   Physical Exam  Constitutional:       General: She is not in acute distress.     Appearance: Normal appearance. She is well-developed. She is not ill-appearing.   HENT:      Head: Normocephalic and atraumatic.   Cardiovascular:      Rate and Rhythm: Normal rate and regular rhythm.      Heart sounds: Normal heart sounds. No murmur heard.  Pulmonary:      Effort: Pulmonary effort is normal. No respiratory distress.      Breath sounds: Normal breath sounds. No decreased breath sounds.   Musculoskeletal:      Lumbar back: Tenderness and bony tenderness present.        Back:       Right lower leg: No edema.      Left lower leg: No edema.   Skin:     General: Skin is " warm and dry.      Findings: No rash.   Neurological:      Sensory: No sensory deficit.      Motor: Motor function is intact.   Psychiatric:         Speech: Speech normal.         Behavior: Behavior normal.         Thought Content: Thought content normal.     Assessment:      1. Hypertension goal BP (blood pressure) < 140/90    2. Lumbar radiculopathy       Plan:   Hypertension goal BP (blood pressure) < 140/90  -     losartan (COZAAR) 50 MG tablet; Take 1 tablet (50 mg total) by mouth once daily.  Dispense: 90 tablet; Refill: 1  -     amLODIPine (NORVASC) 10 MG tablet; Take 1 tablet (10 mg total) by mouth once daily.  Dispense: 90 tablet; Refill: 1    Lumbar radiculopathy  -     Ambulatory referral/consult to [facility]; Future; Expected date: 02/13/2023  -     tiZANidine (ZANAFLEX) 4 MG tablet; Take 1 tablet (4 mg total) by mouth nightly as needed (Muscle strain).  Dispense: 10 tablet; Refill: 0      Restart BP medications  Monitor at home  2-4wk f/u for BP recheck     Discussed RICE and NSAIDs, warm heat, continue PT if possible  Caution with muscle relaxer as may cause drowsiness   Pain Med f/u to be scheduled     Discussed worsening signs/symptoms and when to return to clinic or go to ED.   Patient expresses understanding and agrees with treatment plan.

## 2023-02-10 ENCOUNTER — TELEPHONE (OUTPATIENT)
Dept: PAIN MEDICINE | Facility: CLINIC | Age: 53
End: 2023-02-10
Payer: COMMERCIAL

## 2023-02-13 ENCOUNTER — OFFICE VISIT (OUTPATIENT)
Dept: PAIN MEDICINE | Facility: CLINIC | Age: 53
End: 2023-02-13
Payer: COMMERCIAL

## 2023-02-13 VITALS
BODY MASS INDEX: 42.28 KG/M2 | DIASTOLIC BLOOD PRESSURE: 104 MMHG | SYSTOLIC BLOOD PRESSURE: 151 MMHG | HEART RATE: 95 BPM | HEIGHT: 62 IN | WEIGHT: 229.75 LBS

## 2023-02-13 DIAGNOSIS — M46.1 SACROILIITIS: ICD-10-CM

## 2023-02-13 DIAGNOSIS — M51.36 DDD (DEGENERATIVE DISC DISEASE), LUMBAR: ICD-10-CM

## 2023-02-13 DIAGNOSIS — M54.16 LUMBAR RADICULOPATHY: Primary | ICD-10-CM

## 2023-02-13 PROCEDURE — 3077F PR MOST RECENT SYSTOLIC BLOOD PRESSURE >= 140 MM HG: ICD-10-PCS | Mod: CPTII,S$GLB,, | Performed by: PHYSICAL MEDICINE & REHABILITATION

## 2023-02-13 PROCEDURE — 99204 OFFICE O/P NEW MOD 45 MIN: CPT | Mod: S$GLB,,, | Performed by: PHYSICAL MEDICINE & REHABILITATION

## 2023-02-13 PROCEDURE — 99999 PR PBB SHADOW E&M-EST. PATIENT-LVL IV: ICD-10-PCS | Mod: PBBFAC,,, | Performed by: PHYSICAL MEDICINE & REHABILITATION

## 2023-02-13 PROCEDURE — 3008F PR BODY MASS INDEX (BMI) DOCUMENTED: ICD-10-PCS | Mod: CPTII,S$GLB,, | Performed by: PHYSICAL MEDICINE & REHABILITATION

## 2023-02-13 PROCEDURE — 3008F BODY MASS INDEX DOCD: CPT | Mod: CPTII,S$GLB,, | Performed by: PHYSICAL MEDICINE & REHABILITATION

## 2023-02-13 PROCEDURE — 1159F PR MEDICATION LIST DOCUMENTED IN MEDICAL RECORD: ICD-10-PCS | Mod: CPTII,S$GLB,, | Performed by: PHYSICAL MEDICINE & REHABILITATION

## 2023-02-13 PROCEDURE — 4010F PR ACE/ARB THEARPY RXD/TAKEN: ICD-10-PCS | Mod: CPTII,S$GLB,, | Performed by: PHYSICAL MEDICINE & REHABILITATION

## 2023-02-13 PROCEDURE — 3080F DIAST BP >= 90 MM HG: CPT | Mod: CPTII,S$GLB,, | Performed by: PHYSICAL MEDICINE & REHABILITATION

## 2023-02-13 PROCEDURE — 1159F MED LIST DOCD IN RCRD: CPT | Mod: CPTII,S$GLB,, | Performed by: PHYSICAL MEDICINE & REHABILITATION

## 2023-02-13 PROCEDURE — 99999 PR PBB SHADOW E&M-EST. PATIENT-LVL IV: CPT | Mod: PBBFAC,,, | Performed by: PHYSICAL MEDICINE & REHABILITATION

## 2023-02-13 PROCEDURE — 4010F ACE/ARB THERAPY RXD/TAKEN: CPT | Mod: CPTII,S$GLB,, | Performed by: PHYSICAL MEDICINE & REHABILITATION

## 2023-02-13 PROCEDURE — 3080F PR MOST RECENT DIASTOLIC BLOOD PRESSURE >= 90 MM HG: ICD-10-PCS | Mod: CPTII,S$GLB,, | Performed by: PHYSICAL MEDICINE & REHABILITATION

## 2023-02-13 PROCEDURE — 3077F SYST BP >= 140 MM HG: CPT | Mod: CPTII,S$GLB,, | Performed by: PHYSICAL MEDICINE & REHABILITATION

## 2023-02-13 PROCEDURE — 99204 PR OFFICE/OUTPT VISIT, NEW, LEVL IV, 45-59 MIN: ICD-10-PCS | Mod: S$GLB,,, | Performed by: PHYSICAL MEDICINE & REHABILITATION

## 2023-02-13 NOTE — PROGRESS NOTES
New Patient Chronic Pain Note (Initial Visit)    Referring Physician: Anju Lee, *    PCP: Anjelica Laguerre DO    Chief Complaint:   Chief Complaint   Patient presents with    Low-back Pain     Radiating to right leg and knee        SUBJECTIVE:    Latonya Mendoza is a 52 y.o. female who presents to the clinic for the evaluation of back and leg pain.  She was referred by her primary care team for further evaluation and management of this pain.  She has past medical history of hypertension, anemia, obesity, multiple other medical comorbidities as listed in her chart.  The pain started several months ago and symptoms have been worsening.The pain is located in the lumbosacral area and radiates to the right lower extremity extending to the foot and ankle.  The pain is described as  burning, aching  and is rated as 7/10. The pain is rated with a score of  6/10 on the BEST day and a score of 10/10 on the WORST day.  Symptoms interfere with daily activity. The pain is exacerbated by movement.  The pain is mitigated by heat and NSAIDs.     Patient denies night fever/night sweats, urinary incontinence, bowel incontinence, significant weight loss, significant motor weakness, and loss of sensations.    Pain Disability Index Review:   No flowsheet data found.    Non-Pharmacologic Treatments:  Physical Therapy/Home Exercise: yes, currently active has done total of 3 sessions of physical therapy and transition to home exercises as her 3rd session of physical therapy made her pain significantly worse.  Ice/Heat:yes  TENS: no  Acupuncture: no  Massage: no  Chiropractic: no    Other: no      Pain Medications:  - Opioids:  Norco, tramadol , Tylenol 3  - Adjuvant Medications:  Gabapentin, naproxen, Zanaflex  - Anti-Coagulants:  None     report:  Reviewed and consistent with medication use as prescribed.    Pain Procedures:   Denies      Imaging:   CT abdomen pelvis 01/09/2023:  Heart: Normal size. No effusion.      Lung Bases: Clear.     Liver: Normal size and attenuation. No focal lesions.     Gallbladder: No calcified gallstones.     Bile Ducts: No dilatation.     Pancreas: No mass. No peripancreatic fat stranding.     Spleen: Normal.     Adrenals: Normal.     Kidneys/Ureters: Normal enhancement.  No mass or  hydroureteronephrosis.     Bladder: No wall thickening.     Reproductive organs: Uterus is unremarkable.  Bilateral cysts or follicles are seen within the ovaries.     GI Tract/Mesentery: No evidence of bowel obstruction or inflammation.  Mild constipation.     Peritoneal Space: No ascites or free air.     Retroperitoneum: No significant adenopathy.     Abdominal wall: Normal.     Vasculature: No aneurysm.     Bones: No acute fracture.  Mild degenerative changes lower lumbar spine.  No suspicious lytic or sclerotic lesions.       X-ray right hip 2023:   There is no fracture or dislocation.  The hip joint space is symmetric and well preserved.    X-ray lumbar spine 2023:     There is no compression fracture or malalignment.     There has been interval development of L4-5 degenerative disc disease sequela with disc space narrowing, spurring, and a sub endplate sclerosis.  L4-5 facet arthropathy.  Minimal L5-S1 degenerative disc disease sequela is also noted.  The remaining disc levels are well preserved.         Past Medical History:   Diagnosis Date    Hypertension      Past Surgical History:   Procedure Laterality Date     SECTION  2008    DILATION AND CURETTAGE OF UTERUS  2018    sab;     INCISION AND DRAINAGE, NECK Left 2022    Procedure: INCISION AND DRAINAGE,NECK;  Surgeon: Rigoberto Serrato MD;  Location: [facility];  Service: ENT;  Laterality: Left;     Social History     Socioeconomic History    Marital status: Single   Tobacco Use    Smoking status: Former     Packs/day: 0.50     Types: Cigarettes     Start date:     Smokeless tobacco: Never   Substance and Sexual Activity     Alcohol use: No    Drug use: No    Sexual activity: Yes     Partners: Male     Social Determinants of Health     Financial Resource Strain: Low Risk     Difficulty of Paying Living Expenses: Not hard at all   Food Insecurity: No Food Insecurity    Worried About Running Out of Food in the Last Year: Never true    Ran Out of Food in the Last Year: Never true   Transportation Needs: No Transportation Needs    Lack of Transportation (Medical): No    Lack of Transportation (Non-Medical): No   Physical Activity: Inactive    Days of Exercise per Week: 0 days    Minutes of Exercise per Session: 0 min   Stress: Stress Concern Present    Feeling of Stress : Very much   Social Connections: Unknown    Frequency of Communication with Friends and Family: More than three times a week    Frequency of Social Gatherings with Friends and Family: Never    Active Member of Clubs or Organizations: No    Attends Club or Organization Meetings: Never    Marital Status:    Housing Stability: Low Risk     Unable to Pay for Housing in the Last Year: No    Number of Places Lived in the Last Year: 2    Unstable Housing in the Last Year: No     Family History   Problem Relation Age of Onset    Brain cancer Mother     Breast cancer Mother 62    Lung cancer Father     Brain cancer Father     Breast cancer Maternal Aunt     Breast cancer Paternal Aunt        Review of patient's allergies indicates:  No Known Allergies    Current Outpatient Medications   Medication Sig    amLODIPine (NORVASC) 10 MG tablet Take 1 tablet (10 mg total) by mouth once daily.    losartan (COZAAR) 50 MG tablet Take 1 tablet (50 mg total) by mouth once daily.    naproxen (NAPROSYN) 500 MG tablet Take 1 tablet (500 mg total) by mouth 2 (two) times daily with meals.    tiZANidine (ZANAFLEX) 4 MG tablet Take 1 tablet (4 mg total) by mouth nightly as needed (Muscle strain).     No current facility-administered medications for this visit.       Review of Systems  "    GENERAL:  No weight loss, malaise or fevers.  HEENT:   No recent changes in vision or hearing  NECK:  Negative for lumps, no difficulty with swallowing.  RESPIRATORY:  Negative for cough, wheezing or shortness of breath, patient denies any recent URI.  CARDIOVASCULAR:  Negative for chest pain, leg swelling or palpitations.  GI:  Negative for abdominal discomfort, blood in stools or black stools or change in bowel habits.  MUSCULOSKELETAL:  See HPI.  SKIN:  Negative for lesions, rash, and itching.  PSYCH:  No mood disorder or recent psychosocial stressors.  Patients sleep is not disturbed secondary to pain.  HEMATOLOGY/LYMPHOLOGY:  Negative for prolonged bleeding, bruising easily or swollen nodes.  Patient is not currently taking any anti-coagulants  NEURO:   No history of headaches, syncope, paralysis, seizures or tremors.  All other reviewed and negative other than HPI.    OBJECTIVE:    BP (!) 151/104   Pulse 95   Ht 5' 2" (1.575 m)   Wt 104.2 kg (229 lb 11.5 oz)   BMI 42.02 kg/m²         Physical Exam    GENERAL: Well appearing, in no acute distress, alert and oriented x3.  PSYCH:  Mood and affect appropriate.  SKIN: Skin color, texture, turgor normal, no rashes or lesions.  HEAD/FACE:  Normocephalic, atraumatic. Cranial nerves grossly intact.   CV: RRR with palpation of the radial artery.  PULM: No evidence of respiratory difficulty, symmetric chest rise.  GI:  Soft and non-tender.  BACK: Straight leg raising in the sitting and supine positions is positive to radicular pain on the right. No pain to palpation over the facet joints of the lumbar spine or spinous processes. limited range of motion secondary to pain reproduction, pain worse with flexio  EXTREMITIES:Genu valgum. No deformities, edema, or skin discoloration. Good capillary refill.  MUSCULOSKELETAL: Hip and knee provocative maneuvers are negative.  There is moderate pain with palpation over the sacroiliac joints bilaterally.  FABERs test is " equivocal.  FADIRs test is equivocal.   Bilateral upper and lower extremity strength is normal and symmetric.  No atrophy or tone abnormalities are noted.  NEURO: Bilateral upper and lower extremity coordination and muscle stretch reflexes are physiologic and symmetric.  Plantar response are downgoing. No clonus.  No loss of sensation is noted.  GAIT:  Slow, antalgic.      LABS:  Lab Results   Component Value Date    WBC 13.79 (H) 01/09/2023    HGB 12.4 01/09/2023    HCT 36.5 (L) 01/09/2023    MCV 86 01/09/2023     01/09/2023       CMP  Sodium   Date Value Ref Range Status   01/09/2023 138 136 - 145 mmol/L Final     Potassium   Date Value Ref Range Status   01/09/2023 3.5 3.5 - 5.1 mmol/L Final     Chloride   Date Value Ref Range Status   01/09/2023 106 95 - 110 mmol/L Final     CO2   Date Value Ref Range Status   01/09/2023 22 (L) 23 - 29 mmol/L Final     Glucose   Date Value Ref Range Status   01/09/2023 123 (H) 70 - 110 mg/dL Final     BUN   Date Value Ref Range Status   01/09/2023 11 6 - 20 mg/dL Final     Creatinine   Date Value Ref Range Status   01/09/2023 0.7 0.5 - 1.4 mg/dL Final     Calcium   Date Value Ref Range Status   01/09/2023 9.7 8.7 - 10.5 mg/dL Final     Total Protein   Date Value Ref Range Status   01/09/2023 6.8 6.0 - 8.4 g/dL Final     Albumin   Date Value Ref Range Status   01/09/2023 3.5 3.5 - 5.2 g/dL Final   08/03/2020 4.3 3.5 - 5.2 Final     Total Bilirubin   Date Value Ref Range Status   01/09/2023 0.2 0.1 - 1.0 mg/dL Final     Comment:     For infants and newborns, interpretation of results should be based  on gestational age, weight and in agreement with clinical  observations.    Premature Infant recommended reference ranges:  Up to 24 hours.............<8.0 mg/dL  Up to 48 hours............<12.0 mg/dL  3-5 days..................<15.0 mg/dL  6-29 days.................<15.0 mg/dL       Alkaline Phosphatase   Date Value Ref Range Status   01/09/2023 88 55 - 135 U/L Final     AST    Date Value Ref Range Status   01/09/2023 11 10 - 40 U/L Final     ALT   Date Value Ref Range Status   01/09/2023 8 (L) 10 - 44 U/L Final     Anion Gap   Date Value Ref Range Status   01/09/2023 10 8 - 16 mmol/L Final     eGFR if    Date Value Ref Range Status   07/31/2022 >60 >60 mL/min/1.73 m^2 Final     eGFR if non    Date Value Ref Range Status   07/31/2022 >60 >60 mL/min/1.73 m^2 Final     Comment:     Calculation used to obtain the estimated glomerular filtration  rate (eGFR) is the CKD-EPI equation.          Lab Results   Component Value Date    HGBA1C 5.7 (H) 08/03/2020             ASSESSMENT: 52 y.o. year old female with lower back and right leg pain, consistent with     1. Lumbar radiculopathy  Ambulatory referral/consult to [facility]    IR Epidural Transforaminal Inj 1st Vert Lumbar Uni    IR Epidural Transfor Inj Ea Add Lumb Uni    Case Request-RAD/Other Procedure Area: right L5- S1 and S1 TF MILENA      2. Sacroiliitis        3. DDD (degenerative disc disease), lumbar              PLAN:   - Interventions:  Scheduled for right L5-S1 and S1 TFESI for diagnostic and therapeutic purposes . Explained the risks and benefits of the procedure in detail with the patient today in clinic along with alternative treatment options, and the patient elected to pursue the intervention at this time.      - Anticoagulation use: None      - If no benefit with above procedure, consider  SI joint injections .     - Medications: I have stressed the importance of physical activity and a home exercise plan to help with pain and improve health. and Patient can continue with medications for now since they are providing benefits, using them appropriately, and without side effects.       - Therapy:  Advised patient continue with activities as tolerated, unable to tolerate formal physical therapy at this time secondary to severe pain.    - Labs:  Reviewed    - Imaging: Reviewed available  imaging with patient and answered any questions they had regarding study.    - Consults/Referrals:  None at this time    - Records:  Reviewed/Obtain old records from outside physicians and imaging    - Follow up visit: return to clinic in 4 weeks post procedure or as needed    - Counseled patient regarding the importance of activity modification and physical therapy    - This condition does not require this patient to take time off of work, and the primary goal of our Pain Management services is to improve the patient's functional capacity.    - Patient Questions: Answered all of the patient's questions regarding diagnosis, therapy, and treatment        The above plan and management options were discussed at length with patient. Patient is in agreement with the above and verbalized understanding.    I discussed the goals of interventional chronic pain management with the patient on today's visit.  I explained the utility of injections for diagnostic and therapeutic purposes.  We discussed a multimodal approach to pain including treating the patient's given worst pain at any given time.  We will use a systematic approach to addressing pain.  We will also adopt a multimodal approach that includes injections, adjuvant medications, physical therapy, at times psychiatry.  There may be a limited role for opioid use intermittently in the treatment of pain, more particularly for acute pain although no one approach can be used as a sole treatment modality.    I emphasized the importance of regular exercise, core strengthening and stretching, diet and weight loss as a cornerstone of long-term pain management.      Juvenal Wolfe MD  Interventional Pain Management  [facility]    Disclaimer:  This note was prepared using voice recognition system and is likely to have sound alike errors that may have been overlooked even after proof reading.  Please call me with any questions

## 2023-02-20 RX ORDER — TIZANIDINE 4 MG/1
4 TABLET ORAL EVERY 6 HOURS PRN
Qty: 10 TABLET | Refills: 0 | Status: SHIPPED | OUTPATIENT
Start: 2023-02-20 | End: 2023-02-28 | Stop reason: SDUPTHER

## 2023-02-20 NOTE — TELEPHONE ENCOUNTER
No new care gaps identified.  [facility] Embedded Care Gaps. Reference number: [id number]. 2/20/2023   9:19:26 AM CST

## 2023-02-24 ENCOUNTER — PATIENT MESSAGE (OUTPATIENT)
Dept: PAIN MEDICINE | Facility: HOSPITAL | Age: 53
End: 2023-02-24
Payer: COMMERCIAL

## 2023-03-01 ENCOUNTER — DOCUMENTATION ONLY (OUTPATIENT)
Dept: REHABILITATION | Facility: HOSPITAL | Age: 53
End: 2023-03-01
Payer: COMMERCIAL

## 2023-03-01 PROBLEM — M25.559 HIP PAIN: Status: RESOLVED | Noted: 2023-01-11 | Resolved: 2023-03-01

## 2023-03-01 RX ORDER — TIZANIDINE 4 MG/1
4 TABLET ORAL EVERY 6 HOURS PRN
Qty: 10 TABLET | Refills: 0 | Status: SHIPPED | OUTPATIENT
Start: 2023-03-01 | End: 2023-03-02

## 2023-03-01 NOTE — PROGRESS NOTES
Physical Therapy Discharge Notification  The patient will be discharged from physical therapy due to not returning since 1/19/2023 visit.  She completed evaluation plus 2 treatment sessions.    Jarad Freed, PT

## 2023-03-08 ENCOUNTER — LAB VISIT (OUTPATIENT)
Dept: LAB | Facility: HOSPITAL | Age: 53
End: 2023-03-08
Attending: INTERNAL MEDICINE
Payer: COMMERCIAL

## 2023-03-08 DIAGNOSIS — Z12.11 COLON CANCER SCREENING: ICD-10-CM

## 2023-03-08 PROCEDURE — 82274 ASSAY TEST FOR BLOOD FECAL: CPT | Performed by: INTERNAL MEDICINE

## 2023-03-14 LAB — HEMOCCULT STL QL IA: NEGATIVE

## 2023-03-17 ENCOUNTER — TELEPHONE (OUTPATIENT)
Dept: PAIN MEDICINE | Facility: CLINIC | Age: 53
End: 2023-03-17
Payer: COMMERCIAL

## 2023-03-17 NOTE — TELEPHONE ENCOUNTER
Reached out to patient to reschedule appointment because the provider will be in procedures. No answer. Left message on patients voice mail to call back at earliest convenience to schedule marcello Chinchilla  Medical

## 2023-03-21 ENCOUNTER — TELEPHONE (OUTPATIENT)
Dept: PAIN MEDICINE | Facility: CLINIC | Age: 53
End: 2023-03-21
Payer: COMMERCIAL

## 2023-03-21 NOTE — TELEPHONE ENCOUNTER
Called pt to reschedule appointment on 03/23 due to Peyton Velasquez PA-C being in the OR for procedures. LVM to call back at earliest convenience.

## 2023-04-11 DIAGNOSIS — M54.16 LUMBAR RADICULOPATHY: ICD-10-CM

## 2023-04-11 RX ORDER — NAPROXEN 500 MG/1
500 TABLET ORAL 2 TIMES DAILY WITH MEALS
Qty: 60 TABLET | Refills: 0 | OUTPATIENT
Start: 2023-04-11

## 2023-06-06 DIAGNOSIS — M54.16 LUMBAR RADICULOPATHY: ICD-10-CM

## 2023-06-06 RX ORDER — NAPROXEN 500 MG/1
500 TABLET ORAL 2 TIMES DAILY WITH MEALS
Qty: 60 TABLET | Refills: 1 | Status: SHIPPED | OUTPATIENT
Start: 2023-06-06 | End: 2023-12-29

## 2023-06-15 ENCOUNTER — TELEPHONE (OUTPATIENT)
Dept: INTERNAL MEDICINE | Facility: CLINIC | Age: 53
End: 2023-06-15
Payer: COMMERCIAL

## 2023-06-15 ENCOUNTER — PATIENT OUTREACH (OUTPATIENT)
Dept: ADMINISTRATIVE | Facility: HOSPITAL | Age: 53
End: 2023-06-15
Payer: COMMERCIAL

## 2023-06-15 VITALS — SYSTOLIC BLOOD PRESSURE: 124 MMHG | DIASTOLIC BLOOD PRESSURE: 86 MMHG

## 2023-06-15 NOTE — PROGRESS NOTES
HTN Report: Patient states she checked her BP this am and the reading was 124/86, remote BP will be entered.

## 2023-07-31 DIAGNOSIS — I10 HYPERTENSION GOAL BP (BLOOD PRESSURE) < 140/90: ICD-10-CM

## 2023-07-31 RX ORDER — LOSARTAN POTASSIUM 50 MG/1
50 TABLET ORAL DAILY
Qty: 30 TABLET | Refills: 0 | OUTPATIENT
Start: 2023-07-31

## 2023-07-31 RX ORDER — LOSARTAN POTASSIUM 50 MG/1
50 TABLET ORAL DAILY
Qty: 30 TABLET | Refills: 0 | Status: SHIPPED | OUTPATIENT
Start: 2023-07-31 | End: 2023-12-29 | Stop reason: SDUPTHER

## 2023-07-31 NOTE — TELEPHONE ENCOUNTER
No care due was identified.  [facility] Embedded Care Due Messages. Reference number: [id number].   7/31/2023 11:40:39 AM CDT

## 2023-07-31 NOTE — TELEPHONE ENCOUNTER
Refill Decision Note   Latonya Mendoza  is requesting a refill authorization.  Brief Assessment and Rationale for Refill:  Quick Discontinue     Medication Therapy Plan:  E-Prescribing Status: Receipt confirmed by pharmacy (7/31/2023 10:44 AM CDT)      Comments:     Note composed:11:50 AM 07/31/2023             Appointments     Last Visit   1/9/2023 Anjelica Laguerre, DO   Next Visit   Visit date not found Anjelica Laguerre DO           Appointments     Last Visit   1/9/2023 Anjelica Laguerre, DO   Next Visit   Visit date not found Anjelica Laguerre DO

## 2023-12-26 ENCOUNTER — TELEPHONE (OUTPATIENT)
Dept: INTERNAL MEDICINE | Facility: CLINIC | Age: 53
End: 2023-12-26
Payer: COMMERCIAL

## 2023-12-26 NOTE — TELEPHONE ENCOUNTER
----- Message from Raghu Pathak sent at 12/26/2023  3:01 PM CST -----  Contact: Latonya Meredith is calling in regards to getting appt for annual check up before end of the year and losing out on her benefits, am/pm appt is okay. Please call back at .[phone].      Thanks  TAWNY

## 2023-12-29 ENCOUNTER — HOSPITAL ENCOUNTER (OUTPATIENT)
Dept: RADIOLOGY | Facility: HOSPITAL | Age: 53
Discharge: HOME | End: 2023-12-29
Attending: PHYSICIAN ASSISTANT
Payer: COMMERCIAL

## 2023-12-29 ENCOUNTER — OFFICE VISIT (OUTPATIENT)
Dept: INTERNAL MEDICINE | Facility: CLINIC | Age: 53
End: 2023-12-29
Payer: COMMERCIAL

## 2023-12-29 VITALS
OXYGEN SATURATION: 98 % | WEIGHT: 246.5 LBS | BODY MASS INDEX: 45.36 KG/M2 | HEART RATE: 88 BPM | RESPIRATION RATE: 20 BRPM | SYSTOLIC BLOOD PRESSURE: 134 MMHG | HEIGHT: 62 IN | DIASTOLIC BLOOD PRESSURE: 84 MMHG | TEMPERATURE: 96 F

## 2023-12-29 DIAGNOSIS — Z00.00 ANNUAL PHYSICAL EXAM: Primary | ICD-10-CM

## 2023-12-29 DIAGNOSIS — R10.13 EPIGASTRIC PAIN: ICD-10-CM

## 2023-12-29 DIAGNOSIS — Z80.3 FAMILY HISTORY OF BREAST CANCER: ICD-10-CM

## 2023-12-29 DIAGNOSIS — I10 HYPERTENSION GOAL BP (BLOOD PRESSURE) < 140/90: ICD-10-CM

## 2023-12-29 DIAGNOSIS — R10.9 ABDOMINAL PAIN, UNSPECIFIED ABDOMINAL LOCATION: ICD-10-CM

## 2023-12-29 DIAGNOSIS — D64.9 ANEMIA, UNSPECIFIED TYPE: ICD-10-CM

## 2023-12-29 PROCEDURE — 74019 XR ABDOMEN FLAT AND ERECT: ICD-10-PCS | Mod: 26,,, | Performed by: RADIOLOGY

## 2023-12-29 PROCEDURE — 3008F BODY MASS INDEX DOCD: CPT | Mod: CPTII,S$GLB,, | Performed by: PHYSICIAN ASSISTANT

## 2023-12-29 PROCEDURE — 3079F PR MOST RECENT DIASTOLIC BLOOD PRESSURE 80-89 MM HG: ICD-10-PCS | Mod: CPTII,S$GLB,, | Performed by: PHYSICIAN ASSISTANT

## 2023-12-29 PROCEDURE — 3075F SYST BP GE 130 - 139MM HG: CPT | Mod: CPTII,S$GLB,, | Performed by: PHYSICIAN ASSISTANT

## 2023-12-29 PROCEDURE — 99396 PR PREVENTIVE VISIT,EST,40-64: ICD-10-PCS | Mod: S$GLB,,, | Performed by: PHYSICIAN ASSISTANT

## 2023-12-29 PROCEDURE — 1160F RVW MEDS BY RX/DR IN RCRD: CPT | Mod: CPTII,S$GLB,, | Performed by: PHYSICIAN ASSISTANT

## 2023-12-29 PROCEDURE — 1159F PR MEDICATION LIST DOCUMENTED IN MEDICAL RECORD: ICD-10-PCS | Mod: CPTII,S$GLB,, | Performed by: PHYSICIAN ASSISTANT

## 2023-12-29 PROCEDURE — 99396 PREV VISIT EST AGE 40-64: CPT | Mod: S$GLB,,, | Performed by: PHYSICIAN ASSISTANT

## 2023-12-29 PROCEDURE — 3079F DIAST BP 80-89 MM HG: CPT | Mod: CPTII,S$GLB,, | Performed by: PHYSICIAN ASSISTANT

## 2023-12-29 PROCEDURE — 3008F PR BODY MASS INDEX (BMI) DOCUMENTED: ICD-10-PCS | Mod: CPTII,S$GLB,, | Performed by: PHYSICIAN ASSISTANT

## 2023-12-29 PROCEDURE — 74019 RADEX ABDOMEN 2 VIEWS: CPT | Mod: TC

## 2023-12-29 PROCEDURE — 1160F PR REVIEW ALL MEDS BY PRESCRIBER/CLIN PHARMACIST DOCUMENTED: ICD-10-PCS | Mod: CPTII,S$GLB,, | Performed by: PHYSICIAN ASSISTANT

## 2023-12-29 PROCEDURE — 4010F PR ACE/ARB THEARPY RXD/TAKEN: ICD-10-PCS | Mod: CPTII,S$GLB,, | Performed by: PHYSICIAN ASSISTANT

## 2023-12-29 PROCEDURE — 1159F MED LIST DOCD IN RCRD: CPT | Mod: CPTII,S$GLB,, | Performed by: PHYSICIAN ASSISTANT

## 2023-12-29 PROCEDURE — 74019 RADEX ABDOMEN 2 VIEWS: CPT | Mod: 26,,, | Performed by: RADIOLOGY

## 2023-12-29 PROCEDURE — 99999 PR PBB SHADOW E&M-EST. PATIENT-LVL IV: CPT | Mod: PBBFAC,,, | Performed by: PHYSICIAN ASSISTANT

## 2023-12-29 PROCEDURE — 99999 PR PBB SHADOW E&M-EST. PATIENT-LVL IV: ICD-10-PCS | Mod: PBBFAC,,, | Performed by: PHYSICIAN ASSISTANT

## 2023-12-29 PROCEDURE — 4010F ACE/ARB THERAPY RXD/TAKEN: CPT | Mod: CPTII,S$GLB,, | Performed by: PHYSICIAN ASSISTANT

## 2023-12-29 PROCEDURE — 3075F PR MOST RECENT SYSTOLIC BLOOD PRESS GE 130-139MM HG: ICD-10-PCS | Mod: CPTII,S$GLB,, | Performed by: PHYSICIAN ASSISTANT

## 2023-12-29 RX ORDER — LOSARTAN POTASSIUM 50 MG/1
50 TABLET ORAL DAILY
Qty: 90 TABLET | Refills: 1 | Status: SHIPPED | OUTPATIENT
Start: 2023-12-29 | End: 2024-03-27

## 2023-12-29 NOTE — PROGRESS NOTES
"Subjective:      Patient ID: Latonya Mendoza is a 53 y.o. female.    Chief Complaint: Annual Exam    Patient is known to me, being seen today for annual exam.     HTN- amlodipine 10mg, losartan 50mg     Reports upper abdominal pain x1wk, sore to touch  Pain worse after eating (exception is bread and peppermint which help)  Similar pain 7yrs ago, diagnosed with pancreatitis   Fitkit in March 2023 negative     Denies frequent NSAID use until abdominal pain starting, denies alcohol use     Strong family history of breast cancer (aunts on both mom and dad's side, mom)    Last visit Feb 2023 with myself.       Review of Systems   Constitutional:  Positive for fever (5 days ago, resolved). Negative for appetite change, chills, diaphoresis and fatigue.   HENT:  Negative for congestion, rhinorrhea and sore throat.    Respiratory:  Negative for cough, shortness of breath and wheezing.    Cardiovascular:  Positive for leg swelling (intermittent).   Gastrointestinal:  Positive for abdominal pain, constipation (improving since drinking milk, has had several BMs since that time) and nausea. Negative for blood in stool, diarrhea and vomiting.        Last BM today   Genitourinary:  Negative for dysuria, frequency and hematuria.   Skin:  Negative for rash.   Neurological:  Negative for dizziness, light-headedness and headaches.       Objective:   /84   Pulse 88   Temp 96 °F (35.6 °C) (Tympanic)   Resp 20   Ht 5' 2" (1.575 m)   Wt 111.8 kg (246 lb 7.6 oz)   LMP 12/19/2023 (Approximate)   SpO2 98%   BMI 45.08 kg/m²   Physical Exam  Constitutional:       General: She is not in acute distress.     Appearance: Normal appearance. She is well-developed. She is not ill-appearing.   HENT:      Head: Normocephalic and atraumatic.   Cardiovascular:      Rate and Rhythm: Normal rate and regular rhythm.      Heart sounds: Normal heart sounds. No murmur heard.  Pulmonary:      Effort: Pulmonary effort is normal. No " respiratory distress.      Breath sounds: Normal breath sounds. No decreased breath sounds.   Abdominal:      General: Bowel sounds are normal.      Palpations: Abdomen is soft.      Tenderness: There is generalized abdominal tenderness.   Musculoskeletal:      Right lower leg: Swelling present. No edema.      Left lower leg: Swelling present. No edema.   Skin:     General: Skin is warm and dry.      Findings: No rash.   Psychiatric:         Speech: Speech normal.         Behavior: Behavior normal.         Thought Content: Thought content normal.       Assessment:      1. Annual physical exam    2. Hypertension goal BP (blood pressure) < 140/90    3. Anemia, unspecified type    4. Epigastric pain    5. Family history of breast cancer    6. Abdominal pain, unspecified abdominal location       Plan:   Annual physical exam  -     Hemoglobin A1C; Future; Expected date: 12/29/2023  -     TSH; Future; Expected date: 12/29/2023    Hypertension goal BP (blood pressure) < 140/90  -     Comprehensive Metabolic Panel; Future; Expected date: 12/29/2023  -     Lipid Panel; Future; Expected date: 12/29/2023  -     losartan (COZAAR) 50 MG tablet; Take 1 tablet (50 mg total) by mouth once daily.  Dispense: 90 tablet; Refill: 1    Anemia, unspecified type  -     CBC Auto Differential; Future; Expected date: 12/29/2023    Epigastric pain  -     X-Ray Abdomen Flat And Erect; Future; Expected date: 12/29/2023  -     US Abdomen Complete; Future; Expected date: 12/29/2023  -     Lipase; Future; Expected date: 12/29/2023    Family history of breast cancer  -     Ambulatory referral/consult to Breast Surgery; Future; Expected date: 01/05/2024    Abdominal pain, unspecified abdominal location  -     Urinalysis; Future; Expected date: 12/29/2023  -     Urine culture; Future; Expected date: 12/29/2023      Discussed adequate hydration, bland diet  Avoid NSAIDs and alcohol     Limit salt intake, elevate legs, compression socks     Decline flu  vaccine     Labs today (will schedule lipid another day since not fasting)     6mth f/u PCP     Discussed worsening signs/symptoms and when to return to clinic or go to ED.   Patient expresses understanding and agrees with treatment plan.

## 2023-12-30 ENCOUNTER — LAB VISIT (OUTPATIENT)
Dept: LAB | Facility: HOSPITAL | Age: 53
End: 2023-12-30
Payer: COMMERCIAL

## 2023-12-30 ENCOUNTER — LAB VISIT (OUTPATIENT)
Dept: LAB | Facility: HOSPITAL | Age: 53
End: 2023-12-30
Attending: PHYSICIAN ASSISTANT
Payer: COMMERCIAL

## 2023-12-30 DIAGNOSIS — R10.13 EPIGASTRIC PAIN: ICD-10-CM

## 2023-12-30 DIAGNOSIS — R10.9 ABDOMINAL PAIN, UNSPECIFIED ABDOMINAL LOCATION: ICD-10-CM

## 2023-12-30 DIAGNOSIS — D64.9 ANEMIA, UNSPECIFIED TYPE: ICD-10-CM

## 2023-12-30 DIAGNOSIS — Z00.00 ANNUAL PHYSICAL EXAM: ICD-10-CM

## 2023-12-30 DIAGNOSIS — I10 HYPERTENSION GOAL BP (BLOOD PRESSURE) < 140/90: ICD-10-CM

## 2023-12-30 LAB
ALBUMIN SERPL BCP-MCNC: 3.5 G/DL (ref 3.5–5.2)
ALP SERPL-CCNC: 97 U/L (ref 55–135)
ALT SERPL W/O P-5'-P-CCNC: 12 U/L (ref 10–44)
ANION GAP SERPL CALC-SCNC: 7 MMOL/L (ref 8–16)
AST SERPL-CCNC: 14 U/L (ref 10–40)
BASOPHILS # BLD AUTO: 0.06 K/UL (ref 0–0.2)
BASOPHILS NFR BLD: 0.5 % (ref 0–1.9)
BILIRUB SERPL-MCNC: 0.3 MG/DL (ref 0.1–1)
BILIRUB UR QL STRIP: NEGATIVE
BUN SERPL-MCNC: 12 MG/DL (ref 6–20)
CALCIUM SERPL-MCNC: 9.3 MG/DL (ref 8.7–10.5)
CHLORIDE SERPL-SCNC: 104 MMOL/L (ref 95–110)
CHOLEST SERPL-MCNC: 159 MG/DL (ref 120–199)
CHOLEST/HDLC SERPL: 3.6 {RATIO} (ref 2–5)
CLARITY UR: CLEAR
CO2 SERPL-SCNC: 27 MMOL/L (ref 23–29)
COLOR UR: YELLOW
CREAT SERPL-MCNC: 0.9 MG/DL (ref 0.5–1.4)
DIFFERENTIAL METHOD BLD: ABNORMAL
EOSINOPHIL # BLD AUTO: 0.2 K/UL (ref 0–0.5)
EOSINOPHIL NFR BLD: 1.6 % (ref 0–8)
ERYTHROCYTE [DISTWIDTH] IN BLOOD BY AUTOMATED COUNT: 14.5 % (ref 11.5–14.5)
EST. GFR  (NO RACE VARIABLE): >60 ML/MIN/1.73 M^2
ESTIMATED AVG GLUCOSE: 126 MG/DL (ref 68–131)
GLUCOSE SERPL-MCNC: 133 MG/DL (ref 70–110)
GLUCOSE UR QL STRIP: NEGATIVE
HBA1C MFR BLD: 6 % (ref 4–5.6)
HCT VFR BLD AUTO: 40.3 % (ref 37–48.5)
HDLC SERPL-MCNC: 44 MG/DL (ref 40–75)
HDLC SERPL: 27.7 % (ref 20–50)
HGB BLD-MCNC: 13 G/DL (ref 12–16)
HGB UR QL STRIP: NEGATIVE
IMM GRANULOCYTES # BLD AUTO: 0.04 K/UL (ref 0–0.04)
IMM GRANULOCYTES NFR BLD AUTO: 0.3 % (ref 0–0.5)
KETONES UR QL STRIP: NEGATIVE
LDLC SERPL CALC-MCNC: 102.8 MG/DL (ref 63–159)
LEUKOCYTE ESTERASE UR QL STRIP: NEGATIVE
LIPASE SERPL-CCNC: 48 U/L (ref 4–60)
LYMPHOCYTES # BLD AUTO: 2.4 K/UL (ref 1–4.8)
LYMPHOCYTES NFR BLD: 20.6 % (ref 18–48)
MCH RBC QN AUTO: 30.2 PG (ref 27–31)
MCHC RBC AUTO-ENTMCNC: 32.3 G/DL (ref 32–36)
MCV RBC AUTO: 94 FL (ref 82–98)
MONOCYTES # BLD AUTO: 1 K/UL (ref 0.3–1)
MONOCYTES NFR BLD: 8.9 % (ref 4–15)
NEUTROPHILS # BLD AUTO: 7.9 K/UL (ref 1.8–7.7)
NEUTROPHILS NFR BLD: 68.1 % (ref 38–73)
NITRITE UR QL STRIP: NEGATIVE
NONHDLC SERPL-MCNC: 115 MG/DL
NRBC BLD-RTO: 0 /100 WBC
PH UR STRIP: 6 [PH] (ref 5–8)
PLATELET # BLD AUTO: 259 K/UL (ref 150–450)
PMV BLD AUTO: 12.6 FL (ref 9.2–12.9)
POTASSIUM SERPL-SCNC: 4.4 MMOL/L (ref 3.5–5.1)
PROT SERPL-MCNC: 6.6 G/DL (ref 6–8.4)
PROT UR QL STRIP: NEGATIVE
RBC # BLD AUTO: 4.31 M/UL (ref 4–5.4)
SODIUM SERPL-SCNC: 138 MMOL/L (ref 136–145)
SP GR UR STRIP: 1.02 (ref 1–1.03)
TRIGL SERPL-MCNC: 61 MG/DL (ref 30–150)
TSH SERPL DL<=0.005 MIU/L-ACNC: 2.75 UIU/ML (ref 0.4–4)
URN SPEC COLLECT METH UR: NORMAL
WBC # BLD AUTO: 11.63 K/UL (ref 3.9–12.7)

## 2023-12-30 PROCEDURE — 85025 COMPLETE CBC W/AUTO DIFF WBC: CPT | Performed by: PHYSICIAN ASSISTANT

## 2023-12-30 PROCEDURE — 81003 URINALYSIS AUTO W/O SCOPE: CPT | Performed by: PHYSICIAN ASSISTANT

## 2023-12-30 PROCEDURE — 84443 ASSAY THYROID STIM HORMONE: CPT | Performed by: PHYSICIAN ASSISTANT

## 2023-12-30 PROCEDURE — 83690 ASSAY OF LIPASE: CPT | Performed by: PHYSICIAN ASSISTANT

## 2023-12-30 PROCEDURE — 83036 HEMOGLOBIN GLYCOSYLATED A1C: CPT | Performed by: PHYSICIAN ASSISTANT

## 2023-12-30 PROCEDURE — 36415 COLL VENOUS BLD VENIPUNCTURE: CPT | Performed by: PHYSICIAN ASSISTANT

## 2023-12-30 PROCEDURE — 80061 LIPID PANEL: CPT | Performed by: PHYSICIAN ASSISTANT

## 2023-12-30 PROCEDURE — 80053 COMPREHEN METABOLIC PANEL: CPT | Performed by: PHYSICIAN ASSISTANT

## 2023-12-30 PROCEDURE — 87086 URINE CULTURE/COLONY COUNT: CPT | Performed by: PHYSICIAN ASSISTANT

## 2024-01-01 LAB
BACTERIA UR CULT: NORMAL
BACTERIA UR CULT: NORMAL

## 2024-01-10 ENCOUNTER — PATIENT MESSAGE (OUTPATIENT)
Dept: SURGERY | Facility: CLINIC | Age: 54
End: 2024-01-10
Payer: COMMERCIAL

## 2024-02-12 ENCOUNTER — HOSPITAL ENCOUNTER (EMERGENCY)
Facility: HOSPITAL | Age: 54
Discharge: HOME | End: 2024-02-12
Attending: EMERGENCY MEDICINE
Payer: COMMERCIAL

## 2024-02-12 VITALS
DIASTOLIC BLOOD PRESSURE: 94 MMHG | TEMPERATURE: 99 F | HEART RATE: 90 BPM | OXYGEN SATURATION: 100 % | RESPIRATION RATE: 18 BRPM | WEIGHT: 245.13 LBS | BODY MASS INDEX: 44.84 KG/M2 | SYSTOLIC BLOOD PRESSURE: 169 MMHG

## 2024-02-12 DIAGNOSIS — I10 HYPERTENSION GOAL BP (BLOOD PRESSURE) < 140/90: ICD-10-CM

## 2024-02-12 DIAGNOSIS — Z76.0 ENCOUNTER FOR MEDICATION REFILL: ICD-10-CM

## 2024-02-12 DIAGNOSIS — R07.9 CHEST PAIN: Primary | ICD-10-CM

## 2024-02-12 DIAGNOSIS — E66.01 MORBID OBESITY: ICD-10-CM

## 2024-02-12 DIAGNOSIS — I10 CHRONIC HYPERTENSION: ICD-10-CM

## 2024-02-12 DIAGNOSIS — Z91.148 NONCOMPLIANCE WITH MEDICATION REGIMEN: ICD-10-CM

## 2024-02-12 LAB
ALBUMIN SERPL BCP-MCNC: 3.7 G/DL (ref 3.5–5.2)
ALP SERPL-CCNC: 88 U/L (ref 55–135)
ALT SERPL W/O P-5'-P-CCNC: 13 U/L (ref 10–44)
ANION GAP SERPL CALC-SCNC: 9 MMOL/L (ref 8–16)
AST SERPL-CCNC: 14 U/L (ref 10–40)
BASOPHILS # BLD AUTO: 0.06 K/UL (ref 0–0.2)
BASOPHILS NFR BLD: 0.6 % (ref 0–1.9)
BILIRUB SERPL-MCNC: 0.3 MG/DL (ref 0.1–1)
BNP SERPL-MCNC: 95 PG/ML (ref 0–99)
BUN SERPL-MCNC: 7 MG/DL (ref 6–20)
CALCIUM SERPL-MCNC: 9.1 MG/DL (ref 8.7–10.5)
CHLORIDE SERPL-SCNC: 109 MMOL/L (ref 95–110)
CO2 SERPL-SCNC: 21 MMOL/L (ref 23–29)
CREAT SERPL-MCNC: 0.8 MG/DL (ref 0.5–1.4)
DIFFERENTIAL METHOD BLD: ABNORMAL
EOSINOPHIL # BLD AUTO: 0.2 K/UL (ref 0–0.5)
EOSINOPHIL NFR BLD: 1.9 % (ref 0–8)
ERYTHROCYTE [DISTWIDTH] IN BLOOD BY AUTOMATED COUNT: 13.6 % (ref 11.5–14.5)
EST. GFR  (NO RACE VARIABLE): >60 ML/MIN/1.73 M^2
GLUCOSE SERPL-MCNC: 103 MG/DL (ref 70–110)
HCT VFR BLD AUTO: 35.8 % (ref 37–48.5)
HGB BLD-MCNC: 12 G/DL (ref 12–16)
IMM GRANULOCYTES # BLD AUTO: 0.03 K/UL (ref 0–0.04)
IMM GRANULOCYTES NFR BLD AUTO: 0.3 % (ref 0–0.5)
LYMPHOCYTES # BLD AUTO: 2.3 K/UL (ref 1–4.8)
LYMPHOCYTES NFR BLD: 24.9 % (ref 18–48)
MCH RBC QN AUTO: 30.7 PG (ref 27–31)
MCHC RBC AUTO-ENTMCNC: 33.5 G/DL (ref 32–36)
MCV RBC AUTO: 92 FL (ref 82–98)
MONOCYTES # BLD AUTO: 0.7 K/UL (ref 0.3–1)
MONOCYTES NFR BLD: 7.4 % (ref 4–15)
NEUTROPHILS # BLD AUTO: 6 K/UL (ref 1.8–7.7)
NEUTROPHILS NFR BLD: 64.9 % (ref 38–73)
NRBC BLD-RTO: 0 /100 WBC
PLATELET # BLD AUTO: 218 K/UL (ref 150–450)
PMV BLD AUTO: 11.5 FL (ref 9.2–12.9)
POTASSIUM SERPL-SCNC: 3.7 MMOL/L (ref 3.5–5.1)
PROT SERPL-MCNC: 6.9 G/DL (ref 6–8.4)
RBC # BLD AUTO: 3.91 M/UL (ref 4–5.4)
SODIUM SERPL-SCNC: 139 MMOL/L (ref 136–145)
TROPONIN I SERPL DL<=0.01 NG/ML-MCNC: <0.006 NG/ML (ref 0–0.03)
WBC # BLD AUTO: 9.27 K/UL (ref 3.9–12.7)

## 2024-02-12 PROCEDURE — 84484 ASSAY OF TROPONIN QUANT: CPT | Performed by: EMERGENCY MEDICINE

## 2024-02-12 PROCEDURE — 63600175 PHARM REV CODE 636 W HCPCS: Performed by: EMERGENCY MEDICINE

## 2024-02-12 PROCEDURE — 96374 THER/PROPH/DIAG INJ IV PUSH: CPT

## 2024-02-12 PROCEDURE — 25000003 PHARM REV CODE 250: Performed by: EMERGENCY MEDICINE

## 2024-02-12 PROCEDURE — 85025 COMPLETE CBC W/AUTO DIFF WBC: CPT | Performed by: EMERGENCY MEDICINE

## 2024-02-12 PROCEDURE — 36415 COLL VENOUS BLD VENIPUNCTURE: CPT | Performed by: EMERGENCY MEDICINE

## 2024-02-12 PROCEDURE — 99285 EMERGENCY DEPT VISIT HI MDM: CPT | Mod: 25

## 2024-02-12 PROCEDURE — 83880 ASSAY OF NATRIURETIC PEPTIDE: CPT | Performed by: EMERGENCY MEDICINE

## 2024-02-12 PROCEDURE — 93010 ELECTROCARDIOGRAM REPORT: CPT | Mod: ,,, | Performed by: INTERNAL MEDICINE

## 2024-02-12 PROCEDURE — 93005 ELECTROCARDIOGRAM TRACING: CPT

## 2024-02-12 PROCEDURE — 80053 COMPREHEN METABOLIC PANEL: CPT | Performed by: EMERGENCY MEDICINE

## 2024-02-12 RX ORDER — AMLODIPINE BESYLATE 10 MG/1
10 TABLET ORAL DAILY
Qty: 30 TABLET | Refills: 0 | Status: SHIPPED | OUTPATIENT
Start: 2024-02-12 | End: 2024-02-15 | Stop reason: SDUPTHER

## 2024-02-12 RX ORDER — AMLODIPINE BESYLATE 5 MG/1
10 TABLET ORAL
Status: COMPLETED | OUTPATIENT
Start: 2024-02-12 | End: 2024-02-12

## 2024-02-12 RX ORDER — HYDRALAZINE HYDROCHLORIDE 20 MG/ML
10 INJECTION INTRAMUSCULAR; INTRAVENOUS
Status: COMPLETED | OUTPATIENT
Start: 2024-02-12 | End: 2024-02-12

## 2024-02-12 RX ADMIN — HYDRALAZINE HYDROCHLORIDE 10 MG: 20 INJECTION, SOLUTION INTRAMUSCULAR; INTRAVENOUS at 09:02

## 2024-02-12 RX ADMIN — AMLODIPINE BESYLATE 10 MG: 5 TABLET ORAL at 09:02

## 2024-02-12 NOTE — ED PROVIDER NOTES
"SCRIBE #1 NOTE: I, Pat Kathy, am scribing for, and in the presence of, Carine Luther MD. I have scribed the entire note.       History     Chief Complaint   Patient presents with    Chest Pain     Chest pain started yesterday to left chest, reports shortness of breath and pain with deep breathing. Denies fever.     Review of patient's allergies indicates:  No Known Allergies      History of Present Illness     HPI    2024, 9:05 AM  History obtained from the patient      History of Present Illness: Latonya Mendoza is a 53 y.o. female patient with a PMHx of HTN who presents to the Emergency Department for evaluation of  constant squeezing CP which onset yesterday morning. Pt states pain worsens with talking and inspiration. Pt states she missed 3 days of amlodipine  due to being out of her prescription and was only taking losartin. Pt reports "doubling up" on her losartin dose yesterday. Symptoms are constant and moderate in severity. No mitigating or exacerbating factors reported. Associated sxs include nausea. Patient denies any vomiting, and all other sxs at this time.  No further complaints or concerns at this time.       Arrival mode: Personal vehicle     PCP: Anjelica Laguerre DO        Past Medical History:  Past Medical History:   Diagnosis Date    Hypertension        Past Surgical History:  Past Surgical History:   Procedure Laterality Date     SECTION  2008    DILATION AND CURETTAGE OF UTERUS  2018    sab;     INCISION AND DRAINAGE, NECK Left 2022    Procedure: INCISION AND DRAINAGE,NECK;  Surgeon: Rigoberto Serrato MD;  Location: [facility];  Service: ENT;  Laterality: Left;         Family History:  Family History   Problem Relation Age of Onset    Brain cancer Mother     Breast cancer Mother 62    Lung cancer Father     Brain cancer Father     Breast cancer Maternal Aunt     Breast cancer Paternal Aunt        Social History:  Social History     Tobacco Use    Smoking " status: Every Day     Current packs/day: 0.50     Average packs/day: 0.5 packs/day for 31.1 years (15.6 ttl pk-yrs)     Types: Cigarettes     Start date: 1993    Smokeless tobacco: Never   Substance and Sexual Activity    Alcohol use: No    Drug use: No    Sexual activity: Yes     Partners: Male        Review of Systems     Review of Systems   Cardiovascular:  Positive for chest pain.   Gastrointestinal:  Positive for nausea. Negative for vomiting.      Physical Exam     Initial Vitals [02/12/24 0834]   BP Pulse Resp Temp SpO2   (!) 193/98 71 16 98.7 °F (37.1 °C) 100 %      MAP       --          Physical Exam  Nursing Notes and Vital Signs Reviewed.  Constitutional: Patient is in no apparent distress. Well-developed and well-nourished.  Head: Atraumatic. Normocephalic.  Eyes: PERRL. EOM intact. Conjunctivae are not pale. No scleral icterus.  ENT: Mucous membranes are moist. Oropharynx is clear and symmetric.    Neck: Supple. Full ROM. No lymphadenopathy.  Cardiovascular: Regular rate. Regular rhythm. No murmurs, rubs, or gallops. Distal pulses are 2+ and symmetric.  Pulmonary/Chest: No respiratory distress. Clear to auscultation bilaterally. No wheezing or rales.  Abdominal: Soft and non-distended.  There is no tenderness.  No rebound, guarding, or rigidity.   Genitourinary: No CVA tenderness  Musculoskeletal: Moves all extremities. No obvious deformities. No edema.  Skin: Warm and dry.  Neurological:  Alert, awake, and appropriate.  Normal speech.  No acute focal neurological deficits are appreciated.  Psychiatric: Normal affect. Good eye contact. Appropriate in content.     ED Course   Procedures  ED Vital Signs:  Vitals:    02/12/24 0834 02/12/24 0857 02/12/24 0900 02/12/24 0926   BP: (!) 193/98  (!) 195/108 (!) 174/84   Pulse: 71 79 71    Resp: 16  16    Temp: 98.7 °F (37.1 °C)      TempSrc: Oral      SpO2: 100%  100%    Weight: 111.2 kg (245 lb 2.4 oz)       02/12/24 0930 02/12/24 1000 02/12/24 1100 02/12/24  1200   BP: (!) 174/84 (!) 157/82 (!) 163/88 (!) 178/95   Pulse: 69 77 77 84   Resp: 20 19 18 18   Temp:       TempSrc:       SpO2: 100% 100% 100% 100%   Weight:        02/12/24 1330   BP: (!) 169/94   Pulse: 90   Resp: 18   Temp: 98.6 °F (37 °C)   TempSrc: Oral   SpO2: 100%   Weight:        Abnormal Lab Results:  Labs Reviewed   CBC W/ AUTO DIFFERENTIAL - Abnormal; Notable for the following components:       Result Value    RBC 3.91 (*)     Hematocrit 35.8 (*)     All other components within normal limits    Narrative:     Release to patient->Immediate   COMPREHENSIVE METABOLIC PANEL - Abnormal; Notable for the following components:    CO2 21 (*)     All other components within normal limits   B-TYPE NATRIURETIC PEPTIDE    Narrative:     Release to patient->Immediate   TROPONIN I        All Lab Results:  Results for orders placed or performed during the hospital encounter of 02/12/24   CBC auto differential   Result Value Ref Range    WBC 9.27 3.90 - 12.70 K/uL    RBC 3.91 (L) 4.00 - 5.40 M/uL    Hemoglobin 12.0 12.0 - 16.0 g/dL    Hematocrit 35.8 (L) 37.0 - 48.5 %    MCV 92 82 - 98 fL    MCH 30.7 27.0 - 31.0 pg    MCHC 33.5 32.0 - 36.0 g/dL    RDW 13.6 11.5 - 14.5 %    Platelets 218 150 - 450 K/uL    MPV 11.5 9.2 - 12.9 fL    Immature Granulocytes 0.3 0.0 - 0.5 %    Gran # (ANC) 6.0 1.8 - 7.7 K/uL    Immature Grans (Abs) 0.03 0.00 - 0.04 K/uL    Lymph # 2.3 1.0 - 4.8 K/uL    Mono # 0.7 0.3 - 1.0 K/uL    Eos # 0.2 0.0 - 0.5 K/uL    Baso # 0.06 0.00 - 0.20 K/uL    nRBC 0 0 /100 WBC    Gran % 64.9 38.0 - 73.0 %    Lymph % 24.9 18.0 - 48.0 %    Mono % 7.4 4.0 - 15.0 %    Eosinophil % 1.9 0.0 - 8.0 %    Basophil % 0.6 0.0 - 1.9 %    Differential Method Automated    BNP   Result Value Ref Range    BNP 95 0 - 99 pg/mL   Comprehensive metabolic panel   Result Value Ref Range    Sodium 139 136 - 145 mmol/L    Potassium 3.7 3.5 - 5.1 mmol/L    Chloride 109 95 - 110 mmol/L    CO2 21 (L) 23 - 29 mmol/L    Glucose 103 70 - 110  mg/dL    BUN 7 6 - 20 mg/dL    Creatinine 0.8 0.5 - 1.4 mg/dL    Calcium 9.1 8.7 - 10.5 mg/dL    Total Protein 6.9 6.0 - 8.4 g/dL    Albumin 3.7 3.5 - 5.2 g/dL    Total Bilirubin 0.3 0.1 - 1.0 mg/dL    Alkaline Phosphatase 88 55 - 135 U/L    AST 14 10 - 40 U/L    ALT 13 10 - 44 U/L    eGFR >60 >60 mL/min/1.73 m^2    Anion Gap 9 8 - 16 mmol/L   Troponin I   Result Value Ref Range    Troponin I <0.006 0.000 - 0.026 ng/mL   EKG 12-lead   Result Value Ref Range    QRS Duration 80 ms    [facility] QTC Calculation 437 ms         Imaging Results:  Imaging Results              X-Ray Chest AP Portable (Final result)  Result time 02/12/24 10:35:16      Final result by Dyan VelascoSonu), MD (02/12/24 10:35:16)                   Impression:      Negative single view chest x-ray.      Electronically signed by: Dyan Velasco MD  Date:    02/12/2024  Time:    10:35               Narrative:    EXAMINATION:  XR CHEST AP PORTABLE    CLINICAL HISTORY:  Chest pain, Chest Pain;    COMPARISON:  Chest 01/09/2023    FINDINGS:  One view.  Heart size is normal. The lung fields are clear. No acute cardiopulmonary infiltrate.                                       The EKG was ordered, reviewed, and independently interpreted by the ED provider.  Interpretation time: 8:35  Rate: 69 BPM  Rhythm: normal sinus rhythm with sinus arrythmia   Interpretation: Nonspecific T wave  abnormality. No STEMI.  When compared to EKG performed 1/8/22, there are no significant changes.           The Emergency Provider reviewed the vital signs and test results, which are outlined above.     ED Discussion     12:42 PM: Reassessed pt at this time.  Discussed with pt all pertinent ED information and results. Discussed pt dx and plan of tx. Gave pt all f/u and return to the ED instructions. All questions and concerns were addressed at this time. Pt expresses understanding of information and instructions, and is comfortable with plan to discharge. Pt is stable for  discharge.    I discussed with patient and/or family/caretaker that evaluation in the ED does not suggest any emergent or life threatening medical conditions requiring immediate intervention beyond what was provided in the ED, and I believe patient is safe for discharge.  Regardless, an unremarkable evaluation in the ED does not preclude the development or presence of a serious of life threatening condition. As such, patient was instructed to return immediately for any worsening or change in current symptoms.         Medical Decision Making  DDX:  1. HTN emergency  2. ACS  3. Dissection  4. PE    Presents with atypical chest pain, no vomiting, no diaphoresis, not taking BP meds as prescribed, ECG reviewed and no acute ischemic changes noted, lab work reviewed and otherwise normal including troponin and BNP, chest xray negative, overall feel patient is safe for discharge, HEART SCore of 2, oxygen and resp rate normal no concerns for PE at this point.       Amount and/or Complexity of Data Reviewed  Labs: ordered. Decision-making details documented in ED Course.  Radiology: ordered. Decision-making details documented in ED Course.  ECG/medicine tests: ordered and independent interpretation performed. Decision-making details documented in ED Course.    Risk  Prescription drug management.       Additional MDM:   Smoking Cessation: The patient is a smoker. The patient was counseled on smoking cessation for: 4 minutes. The patient was counseled on tobacco related  health complications.   Heart Score:    History:          Slightly suspicious.  ECG:             Normal  Age:               45-65 years  Risk factors: 1-2 risk factors  Troponin:       Less than or equal to normal limit  Heart Score = 2                ED Medication(s):  Medications   hydrALAZINE injection 10 mg (10 mg Intravenous Given 2/12/24 0493)   amLODIPine tablet 10 mg (10 mg Oral Given 2/12/24 0039)       Discharge Medication List as of 2/12/2024 12:42 PM            Follow-up Information       Anjelica Laguerre DO. Schedule an appointment as soon as possible for a visit in 2 days.    Specialty: Internal Medicine  Why: Return to the Emergency Room, If symptoms worsen  Contact information:  [address] [place] [address] [address] [place] [address]  [phone]                                 Scribe Attestation:   Scribe #1: I performed the above scribed service and the documentation accurately describes the services I performed. I attest to the accuracy of the note.     Attending:   Physician Attestation Statement for Scribe #1: I, Carine Luther MD, personally performed the services described in this documentation, as scribed by Pat Chavez, in my presence, and it is both accurate and complete.           Clinical Impression       ICD-10-CM ICD-9-CM   1. Chest pain  R07.9 786.50   2. Chronic hypertension  I10 401.9   3. Hypertension goal BP (blood pressure) < 140/90  I10 401.9   4. Encounter for medication refill  Z76.0 V68.1   5. Morbid obesity  E66.01 278.01   6. Noncompliance with medication regimen  Z91.148 V15.81       Disposition:   Disposition: Discharged  Condition: Stable         Carine Luther MD  02/13/24 1501

## 2024-02-13 LAB
OHS QRS DURATION: 80 MS
OHS QTC CALCULATION: 437 MS

## 2024-02-15 DIAGNOSIS — I10 HYPERTENSION GOAL BP (BLOOD PRESSURE) < 140/90: ICD-10-CM

## 2024-02-15 NOTE — TELEPHONE ENCOUNTER
No care due was identified.  Health [facility] Embedded Care Due Messages. Reference number: [id number].   2/15/2024 11:38:32 AM CST

## 2024-02-16 DIAGNOSIS — I10 HYPERTENSION GOAL BP (BLOOD PRESSURE) < 140/90: ICD-10-CM

## 2024-02-16 RX ORDER — AMLODIPINE BESYLATE 10 MG/1
10 TABLET ORAL DAILY
Qty: 90 TABLET | Refills: 3 | OUTPATIENT
Start: 2024-02-16

## 2024-02-16 RX ORDER — AMLODIPINE BESYLATE 10 MG/1
10 TABLET ORAL DAILY
Qty: 90 TABLET | Refills: 0 | Status: SHIPPED | OUTPATIENT
Start: 2024-02-16 | End: 2024-03-12 | Stop reason: SDUPTHER

## 2024-02-16 NOTE — TELEPHONE ENCOUNTER
No care due was identified.  [facility] Embedded Care Due Messages. Reference number: [id number].   2/16/2024 2:46:19 PM CST

## 2024-02-16 NOTE — TELEPHONE ENCOUNTER
Refill Routing Note   Medication(s) are not appropriate for processing by [facility] for the following reason(s):        No active prescription written by provider    [facility] action(s):  Defer               Appointments  past 12m or future 3m with PCP    Date Provider   Last Visit   1/9/2023 Anjelica Laguerre, DO   Next Visit   5/6/2024 Anjelica Laguerre DO   ED visits in past 90 days: 1        Note composed:7:28 AM 02/16/2024

## 2024-02-17 NOTE — TELEPHONE ENCOUNTER
Nicol DC. Request already responded to by other means (e.g. phone or fax)   Refill Authorization Note   Latonya Maxwellchrissymaxx  is requesting a refill authorization.  Brief Assessment and Rationale for Refill:  Quick Discontinue  Medication Therapy Plan:  Receipt confirmed by pharmacy (2/16/2024  1:15 PM CST)    Medication Reconciliation Completed:  No      Comments:     Note composed:10:48 PM 02/16/2024

## 2024-02-23 ENCOUNTER — TELEPHONE (OUTPATIENT)
Dept: INTERNAL MEDICINE | Facility: CLINIC | Age: 54
End: 2024-02-23
Payer: COMMERCIAL

## 2024-02-23 NOTE — TELEPHONE ENCOUNTER
LVM for patient to check with pharmacy to see if rx is ready for pickup.      amLODIPine (NORVASC) 10 MG tablet 90 tablet 0 2/16/2024

## 2024-02-23 NOTE — TELEPHONE ENCOUNTER
----- Message from Vane Fajardo sent at 2/23/2024  2:13 PM CST -----  Regarding: Refill  Contact: [phone]  Type:  RX Refill Request    Who Called: Latonya  Refill or New Rx:Refill  RX Name and Strength:Amlodipine  How is the patient currently taking it? (ex. 1XDay): 1 a day  Is this a 30 day or 90 day RX: 30 day  [phone]  Local or Mail Order:local  Ordering Provider: Shade  Would the patient rather a call back or a response via [facility]? Call  Best Call Back Number: [phone]  Additional Information:

## 2024-03-12 ENCOUNTER — OFFICE VISIT (OUTPATIENT)
Dept: INTERNAL MEDICINE | Facility: CLINIC | Age: 54
End: 2024-03-12
Payer: COMMERCIAL

## 2024-03-12 VITALS
HEART RATE: 88 BPM | WEIGHT: 238.56 LBS | TEMPERATURE: 96 F | BODY MASS INDEX: 43.9 KG/M2 | RESPIRATION RATE: 20 BRPM | OXYGEN SATURATION: 100 % | HEIGHT: 62 IN | SYSTOLIC BLOOD PRESSURE: 142 MMHG | DIASTOLIC BLOOD PRESSURE: 92 MMHG

## 2024-03-12 DIAGNOSIS — Z12.11 COLON CANCER SCREENING: ICD-10-CM

## 2024-03-12 DIAGNOSIS — E66.01 MORBID OBESITY WITH BMI OF 40.0-44.9, ADULT: ICD-10-CM

## 2024-03-12 DIAGNOSIS — Z12.31 ENCOUNTER FOR SCREENING MAMMOGRAM FOR MALIGNANT NEOPLASM OF BREAST: ICD-10-CM

## 2024-03-12 DIAGNOSIS — I10 ESSENTIAL HYPERTENSION: Primary | ICD-10-CM

## 2024-03-12 PROCEDURE — 3008F BODY MASS INDEX DOCD: CPT | Mod: CPTII,S$GLB,, | Performed by: INTERNAL MEDICINE

## 2024-03-12 PROCEDURE — 99999 PR PBB SHADOW E&M-EST. PATIENT-LVL IV: CPT | Mod: PBBFAC,,, | Performed by: INTERNAL MEDICINE

## 2024-03-12 PROCEDURE — 1159F MED LIST DOCD IN RCRD: CPT | Mod: CPTII,S$GLB,, | Performed by: INTERNAL MEDICINE

## 2024-03-12 PROCEDURE — 3080F DIAST BP >= 90 MM HG: CPT | Mod: CPTII,S$GLB,, | Performed by: INTERNAL MEDICINE

## 2024-03-12 PROCEDURE — 1160F RVW MEDS BY RX/DR IN RCRD: CPT | Mod: CPTII,S$GLB,, | Performed by: INTERNAL MEDICINE

## 2024-03-12 PROCEDURE — 99214 OFFICE O/P EST MOD 30 MIN: CPT | Mod: S$GLB,,, | Performed by: INTERNAL MEDICINE

## 2024-03-12 PROCEDURE — 3077F SYST BP >= 140 MM HG: CPT | Mod: CPTII,S$GLB,, | Performed by: INTERNAL MEDICINE

## 2024-03-12 RX ORDER — AMLODIPINE BESYLATE 10 MG/1
10 TABLET ORAL DAILY
Qty: 90 TABLET | Refills: 3 | Status: SHIPPED | OUTPATIENT
Start: 2024-03-12 | End: 2024-04-10

## 2024-03-12 RX ORDER — AMLODIPINE BESYLATE 10 MG/1
10 TABLET ORAL DAILY
Qty: 90 TABLET | Refills: 3 | Status: CANCELLED | OUTPATIENT
Start: 2024-03-12

## 2024-03-12 NOTE — PROGRESS NOTES
Latonya Mendoza  53 y.o. Black or  female  [id number]    Chief Complaint:  Chief Complaint   Patient presents with    Hypertension       History of Present Illness:  HTN--uncontrolled. Had been having trouble getting her prescription for amlodipine filled. She has only been taking losartan. She has been having headaches.     History:  Past Medical History:   Diagnosis Date    Hypertension        Medications:  Current Outpatient Medications on File Prior to Visit   Medication Sig Dispense Refill    losartan (COZAAR) 50 MG tablet Take 1 tablet (50 mg total) by mouth once daily. 90 tablet 1     No current facility-administered medications on file prior to visit.       Allergies:  Review of patient's allergies indicates:  No Known Allergies    Review of Systems   Respiratory:  Negative for shortness of breath.    Cardiovascular:  Negative for chest pain.   Neurological:  Positive for headaches. Negative for dizziness.       Exam:  Vitals:    03/12/24 0818   BP: (!) 142/92   Pulse: 88   Resp: 20   Temp: 96 °F (35.6 °C)     Weight: 108.2 kg (238 lb 8.6 oz)   Body mass index is 43.63 kg/m².      Physical Exam  Vitals reviewed.   Constitutional:       General: She is not in acute distress.     Appearance: She is well-developed. She is not ill-appearing.   Eyes:      General: No scleral icterus.  Cardiovascular:      Rate and Rhythm: Normal rate and regular rhythm.      Heart sounds: Normal heart sounds.   Pulmonary:      Effort: Pulmonary effort is normal. No respiratory distress.      Breath sounds: Normal breath sounds.   Skin:     General: Skin is warm and dry.   Neurological:      Mental Status: She is alert and oriented to person, place, and time.   Psychiatric:         Behavior: Behavior normal.       Assessment:  The primary encounter diagnosis was Essential hypertension. Diagnoses of Morbid obesity with BMI of 40.0-44.9, adult, Encounter for screening mammogram for malignant neoplasm of breast,  and Colon cancer screening were also pertinent to this visit.    Plan:  Essential hypertension  -     refill amLODIPine (NORVASC) 10 MG tablet; Take 1 tablet (10 mg total) by mouth once daily.  Dispense: 90 tablet; Refill: 3  -     continue losartan  -     Lifestyle modifications discussed    Morbid obesity with BMI of 40.0-44.9, adult  -     Lifestyle modifications discussed    Encounter for screening mammogram for malignant neoplasm of breast  -     Mammo Digital Screening Bilat w/ Rafa; Future; Expected date: 03/12/2024    Colon cancer screening  -     Fecal Immunochemical Test (iFOBT); Future; Expected date: 03/12/2024    Follow up in about 2 weeks (around 3/26/2024).

## 2024-03-27 ENCOUNTER — CLINICAL SUPPORT (OUTPATIENT)
Dept: INTERNAL MEDICINE | Facility: CLINIC | Age: 54
End: 2024-03-27
Payer: COMMERCIAL

## 2024-03-27 VITALS
HEIGHT: 62 IN | DIASTOLIC BLOOD PRESSURE: 92 MMHG | SYSTOLIC BLOOD PRESSURE: 160 MMHG | RESPIRATION RATE: 20 BRPM | OXYGEN SATURATION: 100 % | HEART RATE: 88 BPM | BODY MASS INDEX: 43.63 KG/M2

## 2024-03-27 DIAGNOSIS — I10 ESSENTIAL HYPERTENSION: ICD-10-CM

## 2024-03-27 PROCEDURE — 99999 PR PBB SHADOW E&M-EST. PATIENT-LVL III: CPT | Mod: PBBFAC,,,

## 2024-03-27 RX ORDER — LOSARTAN POTASSIUM 100 MG/1
100 TABLET ORAL DAILY
Qty: 30 TABLET | Refills: 1 | Status: SHIPPED | OUTPATIENT
Start: 2024-03-27

## 2024-03-27 NOTE — PROGRESS NOTES
Patient came in for a BP check today 160/92 P88 patient said it has been running like that at home also the best reading was 128/88 Dr Laguerre increased Losartan and patient scheduled an nurse BP check in 2 weeks on 04/10/2024

## 2024-03-27 NOTE — TELEPHONE ENCOUNTER
No care due was identified.  [facility] Embedded Care Due Messages. Reference number: [id number].   3/27/2024 5:06:45 PM CDT

## 2024-03-28 RX ORDER — LOSARTAN POTASSIUM 100 MG/1
TABLET ORAL
Qty: 90 TABLET | OUTPATIENT
Start: 2024-03-28

## 2024-03-28 NOTE — TELEPHONE ENCOUNTER
Refill Decision Note   Latonya Mendoza  is requesting a refill authorization.  Brief Assessment and Rationale for Refill:  Quick Discontinue     Medication Therapy Plan:         Comments:     Note composed:4:30 AM 03/28/2024

## 2024-04-10 ENCOUNTER — OFFICE VISIT (OUTPATIENT)
Dept: INTERNAL MEDICINE | Facility: CLINIC | Age: 54
End: 2024-04-10
Payer: COMMERCIAL

## 2024-04-10 ENCOUNTER — CLINICAL SUPPORT (OUTPATIENT)
Dept: INTERNAL MEDICINE | Facility: CLINIC | Age: 54
End: 2024-04-10
Payer: COMMERCIAL

## 2024-04-10 ENCOUNTER — HOSPITAL ENCOUNTER (OUTPATIENT)
Dept: RADIOLOGY | Facility: HOSPITAL | Age: 54
Discharge: HOME | End: 2024-04-10
Attending: INTERNAL MEDICINE
Payer: COMMERCIAL

## 2024-04-10 VITALS
TEMPERATURE: 97 F | BODY MASS INDEX: 43.61 KG/M2 | SYSTOLIC BLOOD PRESSURE: 142 MMHG | DIASTOLIC BLOOD PRESSURE: 94 MMHG | RESPIRATION RATE: 20 BRPM | TEMPERATURE: 97 F | OXYGEN SATURATION: 100 % | HEART RATE: 58 BPM | RESPIRATION RATE: 20 BRPM | WEIGHT: 237 LBS | HEIGHT: 62 IN

## 2024-04-10 VITALS — BODY MASS INDEX: 43.82 KG/M2 | HEIGHT: 62 IN | WEIGHT: 238.13 LBS

## 2024-04-10 DIAGNOSIS — Z12.31 ENCOUNTER FOR SCREENING MAMMOGRAM FOR MALIGNANT NEOPLASM OF BREAST: ICD-10-CM

## 2024-04-10 DIAGNOSIS — I10 ESSENTIAL HYPERTENSION: Primary | ICD-10-CM

## 2024-04-10 DIAGNOSIS — R20.2 PARESTHESIA OF BOTH HANDS: ICD-10-CM

## 2024-04-10 DIAGNOSIS — I10 ESSENTIAL HYPERTENSION: ICD-10-CM

## 2024-04-10 PROCEDURE — 1159F MED LIST DOCD IN RCRD: CPT | Mod: CPTII,S$GLB,, | Performed by: INTERNAL MEDICINE

## 2024-04-10 PROCEDURE — 1160F RVW MEDS BY RX/DR IN RCRD: CPT | Mod: CPTII,S$GLB,, | Performed by: INTERNAL MEDICINE

## 2024-04-10 PROCEDURE — 99214 OFFICE O/P EST MOD 30 MIN: CPT | Mod: S$GLB,,, | Performed by: INTERNAL MEDICINE

## 2024-04-10 PROCEDURE — 3008F BODY MASS INDEX DOCD: CPT | Mod: CPTII,S$GLB,, | Performed by: INTERNAL MEDICINE

## 2024-04-10 PROCEDURE — 99999 PR PBB SHADOW E&M-EST. PATIENT-LVL III: CPT | Mod: PBBFAC,,, | Performed by: INTERNAL MEDICINE

## 2024-04-10 PROCEDURE — 77067 SCR MAMMO BI INCL CAD: CPT | Mod: 26,,, | Performed by: RADIOLOGY

## 2024-04-10 PROCEDURE — 77063 BREAST TOMOSYNTHESIS BI: CPT | Mod: 26,,, | Performed by: RADIOLOGY

## 2024-04-10 PROCEDURE — 4010F ACE/ARB THERAPY RXD/TAKEN: CPT | Mod: CPTII,S$GLB,, | Performed by: INTERNAL MEDICINE

## 2024-04-10 PROCEDURE — 77067 SCR MAMMO BI INCL CAD: CPT | Mod: TC

## 2024-04-10 RX ORDER — AMLODIPINE BESYLATE 5 MG/1
5 TABLET ORAL DAILY
Qty: 30 TABLET | Refills: 1 | Status: SHIPPED | OUTPATIENT
Start: 2024-04-10

## 2024-04-10 RX ORDER — HYDROCHLOROTHIAZIDE 12.5 MG/1
12.5 TABLET ORAL DAILY
Qty: 30 TABLET | Refills: 1 | Status: SHIPPED | OUTPATIENT
Start: 2024-04-10

## 2024-04-10 NOTE — PROGRESS NOTES
Latonya Braga Kelly  53 y.o. Black or  female    HPI: Presents to the clinic to follow up on hypertension. B/P remains high and she is having swelling in her feet since being back on amlodipine 10 mg. When she was off iof it, the swelling went away. She is also taking losartan as prescribed.   She checks her b/p at home and primarily gets diastolic b/p elevation.   She has been having a tingling sensation in both hands for the past week. She denies pain in her  hands. She has a history of prediabetes.     Past Medical History:   Diagnosis Date    Hypertension     Prediabetes      Current Outpatient Medications:     amLODIPine (NORVASC) 10 MG tablet, Take 1 tablet (10 mg total) by mouth once daily., Disp: 30 tablet, Rfl: 1    losartan (COZAAR) 100 MG tablet, Take 1 tablet (100 mg total) by mouth once daily., Disp: 30 tablet, Rfl: 1    Review of patient's allergies indicates:  No Known Allergies    PE: Vital signs reviewed   GEN: Alert and oriented, no acute distress  HEART: Regular rate and rhythm  LUNGS: Respirations unlabored, bilaterally clear    ASSESSMENT/PLAN:  Latonya was seen today for hypertension.    Diagnoses and all orders for this visit:    Essential hypertension  -     start hydroCHLOROthiazide (HYDRODIURIL) 12.5 MG Tab; Take 1 tablet (12.5 mg total) by mouth once daily.  -     decrease amLODIPine (NORVASC) to 5 MG tablet due to swelling; Take 1 tablet (5 mg total) by mouth once daily.  -     continue losartan  -     Lifestyle modifications discussed    Paresthesia of both hands  -     Hemoglobin A1C; Future  -     VITAMIN B12; Future    Labs today.     F/U in 4 weeks.

## 2024-04-10 NOTE — TELEPHONE ENCOUNTER
No care due was identified.  Health [facility] Embedded Care Due Messages. Reference number: [id number].   4/10/2024 10:31:50 AM CDT

## 2024-04-11 RX ORDER — HYDROCHLOROTHIAZIDE 12.5 MG/1
TABLET ORAL
Qty: 90 TABLET | OUTPATIENT
Start: 2024-04-11

## 2024-04-11 RX ORDER — AMLODIPINE BESYLATE 5 MG/1
5 TABLET ORAL
Qty: 90 TABLET | OUTPATIENT
Start: 2024-04-11

## 2024-04-11 NOTE — TELEPHONE ENCOUNTER
Refill Decision Note   Latonya Mendoza  is requesting a refill authorization.    Brief Assessment and Rationale for Refill:   Quick Discontinue       Medication Therapy Plan:   E-Prescribing Status: Receipt confirmed by pharmacy (4/10/2024  9:50 AM CDT)      Comments:     Note composed:10:45 AM 04/11/2024

## 2024-05-09 ENCOUNTER — OFFICE VISIT (OUTPATIENT)
Dept: INTERNAL MEDICINE | Facility: CLINIC | Age: 54
End: 2024-05-09
Payer: COMMERCIAL

## 2024-05-09 VITALS
DIASTOLIC BLOOD PRESSURE: 84 MMHG | BODY MASS INDEX: 44.31 KG/M2 | OXYGEN SATURATION: 99 % | HEART RATE: 82 BPM | SYSTOLIC BLOOD PRESSURE: 122 MMHG | TEMPERATURE: 97 F | WEIGHT: 242.31 LBS | RESPIRATION RATE: 21 BRPM

## 2024-05-09 DIAGNOSIS — R73.03 PREDIABETES: ICD-10-CM

## 2024-05-09 DIAGNOSIS — I10 ESSENTIAL HYPERTENSION: ICD-10-CM

## 2024-05-09 DIAGNOSIS — R07.9 CHEST PAIN, UNSPECIFIED TYPE: Primary | ICD-10-CM

## 2024-05-09 DIAGNOSIS — R06.02 SHORTNESS OF BREATH: ICD-10-CM

## 2024-05-09 PROCEDURE — 3044F HG A1C LEVEL LT 7.0%: CPT | Mod: CPTII,S$GLB,, | Performed by: PHYSICIAN ASSISTANT

## 2024-05-09 PROCEDURE — 99999 PR PBB SHADOW E&M-EST. PATIENT-LVL IV: CPT | Mod: PBBFAC,,, | Performed by: PHYSICIAN ASSISTANT

## 2024-05-09 PROCEDURE — 99214 OFFICE O/P EST MOD 30 MIN: CPT | Mod: S$GLB,,, | Performed by: PHYSICIAN ASSISTANT

## 2024-05-09 PROCEDURE — 3079F DIAST BP 80-89 MM HG: CPT | Mod: CPTII,S$GLB,, | Performed by: PHYSICIAN ASSISTANT

## 2024-05-09 PROCEDURE — 1160F RVW MEDS BY RX/DR IN RCRD: CPT | Mod: CPTII,S$GLB,, | Performed by: PHYSICIAN ASSISTANT

## 2024-05-09 PROCEDURE — 4010F ACE/ARB THERAPY RXD/TAKEN: CPT | Mod: CPTII,S$GLB,, | Performed by: PHYSICIAN ASSISTANT

## 2024-05-09 PROCEDURE — 3008F BODY MASS INDEX DOCD: CPT | Mod: CPTII,S$GLB,, | Performed by: PHYSICIAN ASSISTANT

## 2024-05-09 PROCEDURE — 3074F SYST BP LT 130 MM HG: CPT | Mod: CPTII,S$GLB,, | Performed by: PHYSICIAN ASSISTANT

## 2024-05-09 PROCEDURE — 1159F MED LIST DOCD IN RCRD: CPT | Mod: CPTII,S$GLB,, | Performed by: PHYSICIAN ASSISTANT

## 2024-05-09 RX ORDER — LOSARTAN POTASSIUM 50 MG/1
50 TABLET ORAL DAILY
COMMUNITY
Start: 2024-04-09 | End: 2024-05-09

## 2024-05-09 NOTE — PROGRESS NOTES
"Subjective:      Patient ID: Latonya Mendoza is a 53 y.o. female.    Chief Complaint: Follow-up (She is here for a follow up for HTN. Also states that she is still dealing with swelling in her feet, ankle and legs, her left foot swelling more than the right.)    Patient is known to me, being seen today for BP f/u.     HTN- amlodipine 5mg, losartan, HCTZ 12.5mg   BP controlled at home   Amlodipine decreased to 10mg d/t swelling  Swelling persists, worse at the end of the day     Also reports intermittent chest tightness and shortness of breath   Symptoms persistent for several months now   Evaluated in ER in Feb Feb 2024, BNP and CMP normal, CXR negative   EKG "Normal sinus rhythm with sinus arrhythmia   Nonspecific T wave abnormality"    Denies excess salt intake, does eat fried food   Drinks adequate water     Last visit April 2024 w PCP     Follow-up  Associated symptoms include arthralgias (generalized aches and pains) and fatigue. Pertinent negatives include no abdominal pain, chest pain, chills, congestion, coughing, diaphoresis, fever, headaches, nausea, rash, sore throat or vomiting.     Review of Systems   Constitutional:  Positive for fatigue. Negative for chills, diaphoresis and fever.   HENT:  Negative for congestion, rhinorrhea and sore throat.    Respiratory:  Positive for chest tightness (intermittent) and shortness of breath (with exertion). Negative for cough and wheezing.    Cardiovascular:  Positive for leg swelling. Negative for chest pain.   Gastrointestinal:  Negative for abdominal pain, constipation, diarrhea, nausea and vomiting.   Musculoskeletal:  Positive for arthralgias (generalized aches and pains) and back pain (low back, followed by specialist).   Skin:  Negative for rash.   Neurological:  Negative for dizziness, light-headedness and headaches.       Objective:   /84 (BP Location: Right arm, Patient Position: Sitting, BP Method: Large (Manual))   Pulse 82   Temp 97.4 °F " (36.3 °C) (Tympanic)   Resp (!) 21   Wt 109.9 kg (242 lb 4.6 oz)   LMP 04/22/2024   SpO2 99%   BMI 44.31 kg/m²   Physical Exam  Constitutional:       General: She is not in acute distress.     Appearance: Normal appearance. She is well-developed. She is not ill-appearing.   HENT:      Head: Normocephalic and atraumatic.   Cardiovascular:      Rate and Rhythm: Normal rate and regular rhythm.      Heart sounds: Normal heart sounds. No murmur heard.  Pulmonary:      Effort: Pulmonary effort is normal. No respiratory distress.      Breath sounds: Normal breath sounds. No decreased breath sounds.   Musculoskeletal:      Right lower leg: No swelling. No edema.      Left lower leg: No swelling. No edema.   Skin:     General: Skin is warm and dry.      Findings: No rash.   Psychiatric:         Speech: Speech normal.         Behavior: Behavior normal.         Thought Content: Thought content normal.       Assessment:      1. Chest pain, unspecified type    2. Shortness of breath    3. Prediabetes    4. Essential hypertension       Plan:   Chest pain, unspecified type  -     Ambulatory referral/consult to Cardiology; Future; Expected date: 05/16/2024    Shortness of breath  -     Ambulatory referral/consult to Cardiology; Future; Expected date: 05/16/2024    Prediabetes  -     Hemoglobin A1C; Future; Expected date: 05/09/2024    Essential hypertension  -     CBC Auto Differential; Future; Expected date: 05/09/2024  -     Comprehensive Metabolic Panel; Future; Expected date: 05/09/2024  -     Lipid Panel; Future; Expected date: 05/09/2024      Ensure adequate hydration, limit salt, elevate, compression     6mth f/u w labs

## 2024-05-10 DIAGNOSIS — I10 ESSENTIAL HYPERTENSION: Primary | ICD-10-CM

## 2024-09-17 ENCOUNTER — TELEPHONE (OUTPATIENT)
Dept: PHARMACY | Facility: CLINIC | Age: 54
End: 2024-09-17
Payer: COMMERCIAL

## 2024-09-17 NOTE — TELEPHONE ENCOUNTER
[facility]/Population Health Chart Review & Patient Outreach Details For Medication Adherence Project    Reason for Outreach Encounter: 3rd Party payor non-compliance report ([employer], [employer], C, etc)  2.  Patient Outreach Method: BMEYEhart message  3.   Medication in question: losartan   LAST FILLED: 6/19/24 for 30 day supply  Hypertension Medications               amLODIPine (NORVASC) 10 MG tablet Take 10 mg by mouth.    amLODIPine (NORVASC) 5 MG tablet Take 1 tablet (5 mg total) by mouth once daily.    hydroCHLOROthiazide (HYDRODIURIL) 12.5 MG Tab Take 1 tablet (12.5 mg total) by mouth once daily.    losartan (COZAAR) 100 MG tablet Take 1 tablet (100 mg total) by mouth once daily.              4.  Reviewed and or Updates Made To: Patient Chart  5. Outreach Outcomes and/or actions taken: Sent inquiry to patient: Waiting for response.

## 2024-10-01 DIAGNOSIS — I10 ESSENTIAL HYPERTENSION: ICD-10-CM

## 2024-10-01 RX ORDER — LOSARTAN POTASSIUM 100 MG/1
100 TABLET ORAL DAILY
Qty: 90 TABLET | Refills: 1 | Status: SHIPPED | OUTPATIENT
Start: 2024-10-01

## 2024-10-01 NOTE — TELEPHONE ENCOUNTER
Refill Routing Note   Medication(s) are not appropriate for processing by [facility] for the following reason(s):        New or recently adjusted medication    [facility] action(s):  Defer               Appointments  past 12m or future 3m with PCP    Date Provider   Last Visit   4/10/2024 Anjelica Laguerre DO   Next Visit   11/14/2024 Anjelica Laguerre DO   ED visits in past 90 days: 0        Note composed:6:55 PM 10/01/2024

## 2024-10-23 ENCOUNTER — PATIENT MESSAGE (OUTPATIENT)
Dept: ADMINISTRATIVE | Facility: HOSPITAL | Age: 54
End: 2024-10-23
Payer: COMMERCIAL

## 2024-10-24 DIAGNOSIS — Z12.11 SCREENING FOR COLON CANCER: ICD-10-CM

## 2024-10-27 ENCOUNTER — TELEPHONE (OUTPATIENT)
Dept: PHARMACY | Facility: CLINIC | Age: 54
End: 2024-10-27
Payer: COMMERCIAL

## 2025-01-24 ENCOUNTER — TELEPHONE (OUTPATIENT)
Dept: PHARMACY | Facility: CLINIC | Age: 55
End: 2025-01-24
Payer: COMMERCIAL

## 2025-01-24 NOTE — TELEPHONE ENCOUNTER
[facility]/Population Health Chart Review & Patient Outreach Details For Medication Adherence Project    Reason for Outreach Encounter: 3rd Party payor non-compliance report ([employer], BCBS, C, etc)  2.  Patient Outreach Method: Reviewed Patient Chart  3.   Medication in question: losartan   LAST FILLED: 12/31/24 for 90 day supply  Hypertension Medications               amLODIPine (NORVASC) 10 MG tablet Take 10 mg by mouth.    amLODIPine (NORVASC) 5 MG tablet Take 1 tablet (5 mg total) by mouth once daily.    hydroCHLOROthiazide (HYDRODIURIL) 12.5 MG Tab Take 1 tablet (12.5 mg total) by mouth once daily.    losartan (COZAAR) 100 MG tablet Take 1 tablet (100 mg total) by mouth once daily.              4.  Reviewed and or Updates Made To: Patient Chart  5. Outreach Outcomes and/or actions taken: Patient filled medication and is on track to be adherent

## 2025-04-23 ENCOUNTER — TELEPHONE (OUTPATIENT)
Dept: PHARMACY | Facility: CLINIC | Age: 55
End: 2025-04-23
Payer: COMMERCIAL

## 2025-04-24 NOTE — TELEPHONE ENCOUNTER
[facility]/Population Health Chart Review & Patient Outreach Details For Medication Adherence Project    Reason for Outreach Encounter: 3rd Party payor non-compliance report ([employer], BCBS, [employer], etc)  2.  Patient Outreach Method: Reviewed patient chart  and [employer] message  3.   Medication in question:    Hypertension Medications              amLODIPine (NORVASC) 10 MG tablet Take 10 mg by mouth.    amLODIPine (NORVASC) 5 MG tablet Take 1 tablet (5 mg total) by mouth once daily.    hydroCHLOROthiazide (HYDRODIURIL) 12.5 MG Tab Take 1 tablet (12.5 mg total) by mouth once daily.    losartan (COZAAR) 100 MG tablet Take 1 tablet (100 mg total) by mouth once daily.                 losartan  last filled  12/31 for 90 day supply      4.  Reviewed and or Updates Made To: Patient Chart  5. Outreach Outcomes and/or actions taken: Sent inquiry to patient: Waiting for response  Additional Notes:

## 2025-05-21 ENCOUNTER — TELEPHONE (OUTPATIENT)
Dept: PHARMACY | Facility: CLINIC | Age: 55
End: 2025-05-21
Payer: COMMERCIAL

## 2025-05-21 NOTE — TELEPHONE ENCOUNTER
[facility]/Population Health Chart Review & Patient Outreach Details For Medication Adherence Project    Reason for Outreach Encounter: 3rd Party payor non-compliance report ([employer], BCBS, UHC, etc)  2.  Patient Outreach Method: Reviewed patient chart  and [employer] message  3.   Medication in question:    Hypertension Medications              amLODIPine (NORVASC) 10 MG tablet Take 10 mg by mouth.    amLODIPine (NORVASC) 5 MG tablet Take 1 tablet (5 mg total) by mouth once daily.    hydroCHLOROthiazide (HYDRODIURIL) 12.5 MG Tab Take 1 tablet (12.5 mg total) by mouth once daily.    losartan (COZAAR) 100 MG tablet Take 1 tablet (100 mg total) by mouth once daily.                 LF 90 ds 12/31/24    4.  Reviewed and or Updates Made To: Patient Chart  5. Outreach Outcomes and/or actions taken: Sent inquiry to patient: Waiting for response  Additional Notes:

## 2025-05-27 DIAGNOSIS — I10 ESSENTIAL HYPERTENSION: ICD-10-CM

## 2025-05-27 NOTE — TELEPHONE ENCOUNTER
Refill Routing Note   Medication(s) are not appropriate for processing by [facility] for the following reason(s):        Required labs outdated  Required vitals outdated    [facility] action(s):  Defer   Requires appointment : Yes   Requires labs : Yes             Appointments  past 12m or future 3m with PCP    Date Provider   Last Visit   4/10/2024 Anjelica Laguerre DO   Next Visit   Visit date not found Anjelica Laguerre DO   ED visits in past 90 days: 0        Note composed:6:10 PM 05/27/2025

## 2025-05-27 NOTE — TELEPHONE ENCOUNTER
Care Due:                  Date            Visit Type   Department     Provider  --------------------------------------------------------------------------------                                EP -                              PRIMARY      [facility] INTERNAL  Last Visit: 04-      CARE ([facility])   MEDICINE       Anjelica Laguerre  Next Visit: None Scheduled  None         None Found                                                            Last  Test          Frequency    Reason                     Performed    Due Date  --------------------------------------------------------------------------------    Office Visit  15 months..  amLODIPine,                04-   07-                             hydroCHLOROthiazide,                             losartan.................    CMP.........  12 months..  hydroCHLOROthiazide,       02- 02-                             losartan.................    [employer] Embedded Care Due Messages. Reference number: [id number].   5/27/2025 2:30:31 PM CDT

## 2025-05-28 RX ORDER — LOSARTAN POTASSIUM 100 MG/1
100 TABLET ORAL DAILY
Qty: 90 TABLET | Refills: 0 | Status: SHIPPED | OUTPATIENT
Start: 2025-05-28

## 2025-06-03 ENCOUNTER — HOSPITAL ENCOUNTER (EMERGENCY)
Facility: HOSPITAL | Age: 55
Discharge: HOME | End: 2025-06-03
Attending: EMERGENCY MEDICINE
Payer: COMMERCIAL

## 2025-06-03 VITALS
HEIGHT: 63 IN | WEIGHT: 249.19 LBS | SYSTOLIC BLOOD PRESSURE: 152 MMHG | DIASTOLIC BLOOD PRESSURE: 95 MMHG | RESPIRATION RATE: 18 BRPM | BODY MASS INDEX: 44.15 KG/M2 | HEART RATE: 86 BPM | OXYGEN SATURATION: 99 % | TEMPERATURE: 98 F

## 2025-06-03 DIAGNOSIS — R07.9 CHEST PAIN: Primary | ICD-10-CM

## 2025-06-03 LAB
ABSOLUTE EOSINOPHIL (OHS): 0.03 K/UL
ABSOLUTE MONOCYTE (OHS): 0.66 K/UL (ref 0.3–1)
ABSOLUTE NEUTROPHIL COUNT (OHS): 10.41 K/UL (ref 1.8–7.7)
ALBUMIN SERPL BCP-MCNC: 4.1 G/DL (ref 3.5–5.2)
ALP SERPL-CCNC: 124 UNIT/L (ref 40–150)
ALT SERPL W/O P-5'-P-CCNC: 26 UNIT/L (ref 10–44)
ANION GAP (OHS): 14 MMOL/L (ref 8–16)
AST SERPL-CCNC: 17 UNIT/L (ref 11–45)
BACTERIA #/AREA URNS AUTO: NORMAL /HPF
BASOPHILS # BLD AUTO: 0.07 K/UL
BASOPHILS NFR BLD AUTO: 0.5 %
BILIRUB SERPL-MCNC: 0.4 MG/DL (ref 0.1–1)
BILIRUB UR QL STRIP.AUTO: NEGATIVE
BNP SERPL-MCNC: 94 PG/ML (ref 0–99)
BUN SERPL-MCNC: 11 MG/DL (ref 6–20)
CALCIUM SERPL-MCNC: 9.8 MG/DL (ref 8.7–10.5)
CHLORIDE SERPL-SCNC: 103 MMOL/L (ref 95–110)
CLARITY UR: CLEAR
CO2 SERPL-SCNC: 21 MMOL/L (ref 23–29)
COLOR UR AUTO: COLORLESS
CREAT SERPL-MCNC: 1.2 MG/DL (ref 0.5–1.4)
ERYTHROCYTE [DISTWIDTH] IN BLOOD BY AUTOMATED COUNT: 13.5 % (ref 11.5–14.5)
GFR SERPLBLD CREATININE-BSD FMLA CKD-EPI: 54 ML/MIN/1.73/M2
GLUCOSE SERPL-MCNC: 309 MG/DL (ref 70–110)
GLUCOSE UR QL STRIP: ABNORMAL
HCT VFR BLD AUTO: 43.2 % (ref 37–48.5)
HGB BLD-MCNC: 14.5 GM/DL (ref 12–16)
HGB UR QL STRIP: NEGATIVE
HOLD SPECIMEN: NORMAL
IMM GRANULOCYTES # BLD AUTO: 0.03 K/UL (ref 0–0.04)
IMM GRANULOCYTES NFR BLD AUTO: 0.2 % (ref 0–0.5)
KETONES UR QL STRIP: NEGATIVE
LEUKOCYTE ESTERASE UR QL STRIP: NEGATIVE
LYMPHOCYTES # BLD AUTO: 1.79 K/UL (ref 1–4.8)
MCH RBC QN AUTO: 31.3 PG (ref 27–31)
MCHC RBC AUTO-ENTMCNC: 33.6 G/DL (ref 32–36)
MCV RBC AUTO: 93 FL (ref 82–98)
MICROSCOPIC COMMENT: NORMAL
NITRITE UR QL STRIP: NEGATIVE
NUCLEATED RBC (/100WBC) (OHS): 0 /100 WBC
OHS QRS DURATION: 78 MS
OHS QTC CALCULATION: 451 MS
PH UR STRIP: 8 [PH]
PLATELET # BLD AUTO: 223 K/UL (ref 150–450)
PMV BLD AUTO: 12.4 FL (ref 9.2–12.9)
POTASSIUM SERPL-SCNC: 4.2 MMOL/L (ref 3.5–5.1)
PROT SERPL-MCNC: 8 GM/DL (ref 6–8.4)
PROT UR QL STRIP: NEGATIVE
RBC # BLD AUTO: 4.64 M/UL (ref 4–5.4)
RELATIVE EOSINOPHIL (OHS): 0.2 %
RELATIVE LYMPHOCYTE (OHS): 13.8 % (ref 18–48)
RELATIVE MONOCYTE (OHS): 5.1 % (ref 4–15)
RELATIVE NEUTROPHIL (OHS): 80.2 % (ref 38–73)
SODIUM SERPL-SCNC: 138 MMOL/L (ref 136–145)
SP GR UR STRIP: 1.01
TROPONIN I SERPL DL<=0.01 NG/ML-MCNC: <0.006 NG/ML
TROPONIN I SERPL DL<=0.01 NG/ML-MCNC: <0.006 NG/ML
UROBILINOGEN UR STRIP-ACNC: NEGATIVE EU/DL
WBC # BLD AUTO: 12.99 K/UL (ref 3.9–12.7)
WBC #/AREA URNS AUTO: 1 /HPF (ref 0–5)
YEAST UR QL AUTO: NORMAL /HPF

## 2025-06-03 PROCEDURE — 93010 ELECTROCARDIOGRAM REPORT: CPT | Mod: ,,, | Performed by: INTERNAL MEDICINE

## 2025-06-03 PROCEDURE — 85025 COMPLETE CBC W/AUTO DIFF WBC: CPT | Performed by: NURSE PRACTITIONER

## 2025-06-03 PROCEDURE — 93005 ELECTROCARDIOGRAM TRACING: CPT

## 2025-06-03 PROCEDURE — 83880 ASSAY OF NATRIURETIC PEPTIDE: CPT | Performed by: NURSE PRACTITIONER

## 2025-06-03 PROCEDURE — 84484 ASSAY OF TROPONIN QUANT: CPT | Performed by: NURSE PRACTITIONER

## 2025-06-03 PROCEDURE — 99285 EMERGENCY DEPT VISIT HI MDM: CPT | Mod: 25

## 2025-06-03 PROCEDURE — 81003 URINALYSIS AUTO W/O SCOPE: CPT | Performed by: EMERGENCY MEDICINE

## 2025-06-03 PROCEDURE — 80053 COMPREHEN METABOLIC PANEL: CPT | Performed by: NURSE PRACTITIONER

## 2025-06-03 NOTE — ED NOTES
Bed: Int 25  Expected date:   Expected time:   Means of arrival:   Comments:     Lawson Giraldo Jr., [facility]  06/03/25 [id number]

## 2025-06-03 NOTE — FIRST PROVIDER EVALUATION
"Medical screening examination initiated.  I have conducted a focused provider triage encounter, findings are as follows:    Brief history of present illness:  intermittent chest pain for the past several days     Vitals:    06/03/25 1100   BP: (!) 167/96   BP Location: Right arm   Pulse: 82   Resp: 17   Temp: 99.1 °F (37.3 °C)   TempSrc: Oral   SpO2: 99%   Weight: 113 kg (249 lb 3.2 oz)   Height: 5' 3" (1.6 m)       Pertinent physical exam:  nad    Brief workup plan:  cardiac workup     Preliminary workup initiated; this workup will be continued and followed by the physician or advanced practice provider that is assigned to the patient when roomed.  "

## 2025-06-03 NOTE — ED PROVIDER NOTES
Encounter Date: 6/3/2025       History     Chief Complaint   Patient presents with    Chest Pain    Weakness    Vomiting     Vomiting, weakness, midsternal chest pain that radiates under left breast since last night. Hx of HTN and reports compliance with BP meds.      54-year-old female presents emergency department with complaints of chest pain.  She states chest pain began last night.  Associated symptoms include nausea and vomiting which has now resolved.  Patient states symptoms have improved since last night overall.  She currently denies any chest pain, nausea, fever, chills, diarrhea or any other symptoms.    The history is provided by the patient.     Review of patient's allergies indicates:  No Known Allergies  Past Medical History:   Diagnosis Date    Hypertension     Prediabetes      Past Surgical History:   Procedure Laterality Date     SECTION  2008    DILATION AND CURETTAGE OF UTERUS  2018    sab;     INCISION AND DRAINAGE, NECK Left 2022    Procedure: INCISION AND DRAINAGE,NECK;  Surgeon: Rigoberto Serrato MD;  Location: [facility];  Service: ENT;  Laterality: Left;     Family History   Problem Relation Name Age of Onset    Brain cancer Mother Shilpa Gibbons     Breast cancer Mother Shilpa Gibbons 62    Cancer Mother Shilpa Gibbons         Breast/brain    Lung cancer Father Oracio vappie     Brain cancer Father Oracio vappie     Cancer Father Oracio vappie         Lung/brain    Breast cancer Maternal Aunt      Breast cancer Paternal Aunt      Cancer Maternal Aunt Lydia tasha         Brain     Social History[1]  Review of Systems   Constitutional:  Negative for fever.   HENT:  Negative for sore throat.    Respiratory:  Negative for shortness of breath.    Cardiovascular:  Positive for chest pain.   Gastrointestinal:  Positive for nausea and vomiting.   Genitourinary:  Negative for dysuria.   Musculoskeletal:  Negative for back pain.   Skin:  Negative for rash.   Neurological:  Positive  for weakness.   Hematological:  Does not bruise/bleed easily.   All other systems reviewed and are negative.      Physical Exam     Initial Vitals [06/03/25 1100]   BP Pulse Resp Temp SpO2   (!) 167/96 82 17 99.1 °F (37.3 °C) 99 %      MAP       --         Physical Exam    Constitutional: She appears well-developed and well-nourished. She is not diaphoretic. No distress.   HENT:   Head: Normocephalic and atraumatic.   Eyes: Conjunctivae and EOM are normal. Pupils are equal, round, and reactive to light.   Neck: Neck supple.   Normal range of motion.  Cardiovascular:  Normal rate, regular rhythm and normal heart sounds.           No murmur heard.  Pulmonary/Chest: Breath sounds normal. No respiratory distress. She has no wheezes. She has no rales.   Abdominal: Abdomen is soft. Bowel sounds are normal. There is no abdominal tenderness. There is no rebound and no guarding.   Musculoskeletal:         General: No tenderness or edema. Normal range of motion.      Cervical back: Normal range of motion and neck supple.     Neurological: She is alert and oriented to person, place, and time. No cranial nerve deficit. GCS score is 15. GCS eye subscore is 4. GCS verbal subscore is 5. GCS motor subscore is 6.   Skin: Skin is warm and dry. Capillary refill takes less than 2 seconds.   Psychiatric: She has a normal mood and affect. Thought content normal.         ED Course   Procedures  Labs Reviewed   COMPREHENSIVE METABOLIC PANEL - Abnormal       Result Value    Sodium 138      Potassium 4.2      Chloride 103      CO2 21 (*)     Glucose 309 (*)     BUN 11      Creatinine 1.2      Calcium 9.8      Protein Total 8.0      Albumin 4.1      Bilirubin Total 0.4            AST 17      ALT 26      Anion Gap 14      eGFR 54 (*)    CBC WITH DIFFERENTIAL - Abnormal    WBC 12.99 (*)     RBC 4.64      HGB 14.5      HCT 43.2      MCV 93      MCH 31.3 (*)     MCHC 33.6      RDW 13.5      Platelet Count 223      MPV 12.4      Nucleated  RBC 0      Neut % 80.2 (*)     Lymph % 13.8 (*)     Mono % 5.1      Eos % 0.2      Basophil % 0.5      Imm Grans % 0.2      Neut # 10.41 (*)     Lymph # 1.79      Mono # 0.66      Eos # 0.03      Baso # 0.07      Imm Grans # 0.03     URINALYSIS, REFLEX TO URINE CULTURE - Abnormal    Color, UA Colorless (*)     Appearance, UA Clear      pH, UA 8.0      Spec Grav UA 1.010      Protein, UA Negative      Glucose, UA 4+ (*)     Ketones, UA Negative      Bilirubin, UA Negative      Blood, UA Negative      Nitrites, UA Negative      Urobilinogen, UA Negative      Leukocyte Esterase, UA Negative     TROPONIN I - Normal    Troponin-I <0.006     TROPONIN I - Normal    Troponin-I <0.006     B-TYPE NATRIURETIC PEPTIDE - Normal    BNP 94     CBC W/ AUTO DIFFERENTIAL    Narrative:     The following orders were created for panel order CBC auto differential.  Procedure                               Abnormality         Status                     ---------                               -----------         ------                     CBC with Differential[[id number]]       Abnormal            Final result                 Please view results for these tests on the individual orders.   GREY TOP URINE HOLD    Extra Tube Hold for add-ons.     URINALYSIS MICROSCOPIC    WBC, UA 1      Bacteria, UA None      Yeast, UA None      Microscopic Comment         EKG Readings: (Independently Interpreted)   Initial Reading: No STEMI. Rhythm: Normal Sinus Rhythm. Heart Rate: 84.     ECG Results              EKG 12-lead (In process)        Collection Time Result Time QRS Duration [facility] QTC Calculation    06/03/25 11:03:33 06/03/25 12:04:24 78 451                     In process by Interface, Lab In [facility] (06/03/25 12:04:29)                   Narrative:    Test Reason : R07.9,    Vent. Rate :  84 BPM     Atrial Rate :  84 BPM     P-R Int : 150 ms          QRS Dur :  78 ms      QT Int : 382 ms       P-R-T Axes :  74  67  45 degrees    QTcB Int : 451  ms    Normal sinus rhythm  Nonspecific T wave abnormality  Abnormal ECG  When compared with ECG of 12-Feb-2024 08:35,  No significant change was found    Referred By: AAAREFERRAL SELF           Confirmed By:                                   Imaging Results              X-Ray Chest AP Portable (Final result)  Result time 06/03/25 12:46:58      Final result by Rafat Collado MD (06/03/25 12:46:58)                   Impression:      No acute abnormality.      Electronically signed by: Rafat Collado  Date:    06/03/2025  Time:    12:46               Narrative:    EXAMINATION:  XR CHEST AP PORTABLE    CLINICAL HISTORY:  Chest Pain;    TECHNIQUE:  Single frontal view of the chest was performed.    COMPARISON:  None    FINDINGS:  The lungs are clear, with normal appearance of pulmonary vasculature and no pleural effusion or pneumothorax.    The cardiac silhouette is normal in size. The hilar and mediastinal contours are unremarkable.    Bones are intact.                                       Medications - No data to display  Medical Decision Making  Amount and/or Complexity of Data Reviewed  Labs: ordered.     Details: Glucose 309  Discussion of management or test interpretation with external provider(s): Advised patient to follow up with Cardiology outpatient.  She should return to the emergency department as needed or if symptoms worsen.    Risk  Risk Details: I discussed with patient and/or family/caretaker that evaluation in the ED does not suggest any emergent or life threatening medical conditions requiring immediate intervention beyond what was provided in the ED, and I believe patient is safe for discharge.  Regardless, an unremarkable evaluation in the ED does not preclude the development or presence of a serious of life threatening condition. As such, patient was instructed to return immediately for any worsening or change in current symptoms.                                        Clinical  Impression:  Final diagnoses:  [R07.9] Chest pain (Primary)          ED Disposition Condition    Discharge Stable          ED Prescriptions    None       Follow-up Information       Follow up With Specialties Details Why Contact Info    Anjelica Laguerre DO Internal Medicine In 1 week  [id number] [place] [address] DR Bruno JEFF [address] [phone]                     [1]   Social History  Tobacco Use    Smoking status: Every Day     Current packs/day: 0.50     Average packs/day: 0.5 packs/day for 32.4 years (16.2 ttl pk-yrs)     Types: Cigarettes     Start date: 1993    Smokeless tobacco: Never   Substance Use Topics    Alcohol use: No    Drug use: No        Lawson Giraldo Jr., [facility]  06/03/25 [id number]

## 2025-06-11 ENCOUNTER — PATIENT OUTREACH (OUTPATIENT)
Dept: ADMINISTRATIVE | Facility: HOSPITAL | Age: 55
End: 2025-06-11
Payer: COMMERCIAL

## 2025-06-11 NOTE — PROGRESS NOTES
VBHM Score: 3     Colon Cancer Screening  Mammogram  Uncontrolled BP                 Pt last seen May 2024 for follow up.  Attempted to contact pt to schedule annual exam and mammo. No answer, voice mail box full, unable to leave message.

## 2025-06-17 ENCOUNTER — TELEPHONE (OUTPATIENT)
Dept: PHARMACY | Facility: CLINIC | Age: 55
End: 2025-06-17
Payer: COMMERCIAL

## 2025-06-18 NOTE — TELEPHONE ENCOUNTER
[facility]/Population Health Chart Review & Patient Outreach Details For Medication Adherence Project    Reason for Outreach Encounter: 3rd Party payor non-compliance report ([employer], [employer], C, etc)  2.  Patient Outreach Method: Reviewed Patient Chart  3.   Medication in question: losartan    LAST FILLED: 5/28/25 for 90 day supply  Hypertension Medications              amLODIPine (NORVASC) 10 MG tablet Take 10 mg by mouth.    amLODIPine (NORVASC) 5 MG tablet Take 1 tablet (5 mg total) by mouth once daily.    hydroCHLOROthiazide (HYDRODIURIL) 12.5 MG Tab Take 1 tablet (12.5 mg total) by mouth once daily.    losartan (COZAAR) 100 MG tablet Take 1 tablet (100 mg total) by mouth once daily.              4.  Reviewed and or Updates Made To: Patient Chart  5. Outreach Outcomes and/or actions taken: Patient filled medication and is on track to be adherent

## 2025-07-02 ENCOUNTER — OFFICE VISIT (OUTPATIENT)
Dept: INTERNAL MEDICINE | Facility: CLINIC | Age: 55
End: 2025-07-02
Payer: COMMERCIAL

## 2025-07-02 VITALS
HEART RATE: 78 BPM | HEIGHT: 63 IN | SYSTOLIC BLOOD PRESSURE: 115 MMHG | DIASTOLIC BLOOD PRESSURE: 86 MMHG | BODY MASS INDEX: 43.36 KG/M2 | TEMPERATURE: 97 F | WEIGHT: 244.69 LBS | OXYGEN SATURATION: 98 %

## 2025-07-02 DIAGNOSIS — Z12.11 COLON CANCER SCREENING: ICD-10-CM

## 2025-07-02 DIAGNOSIS — Z12.31 ENCOUNTER FOR SCREENING MAMMOGRAM FOR MALIGNANT NEOPLASM OF BREAST: ICD-10-CM

## 2025-07-02 DIAGNOSIS — I10 ESSENTIAL HYPERTENSION: ICD-10-CM

## 2025-07-02 DIAGNOSIS — R14.0 BLOATING: ICD-10-CM

## 2025-07-02 DIAGNOSIS — R14.2 BURPING: ICD-10-CM

## 2025-07-02 DIAGNOSIS — R10.10 UPPER ABDOMINAL PAIN: Primary | ICD-10-CM

## 2025-07-02 PROCEDURE — G2211 COMPLEX E/M VISIT ADD ON: HCPCS | Mod: S$GLB,,, | Performed by: INTERNAL MEDICINE

## 2025-07-02 PROCEDURE — 99999 PR PBB SHADOW E&M-EST. PATIENT-LVL IV: CPT | Mod: PBBFAC,,, | Performed by: INTERNAL MEDICINE

## 2025-07-02 PROCEDURE — 99214 OFFICE O/P EST MOD 30 MIN: CPT | Mod: S$GLB,,, | Performed by: INTERNAL MEDICINE

## 2025-07-02 PROCEDURE — 4010F ACE/ARB THERAPY RXD/TAKEN: CPT | Mod: CPTII,S$GLB,, | Performed by: INTERNAL MEDICINE

## 2025-07-02 PROCEDURE — 1160F RVW MEDS BY RX/DR IN RCRD: CPT | Mod: CPTII,S$GLB,, | Performed by: INTERNAL MEDICINE

## 2025-07-02 PROCEDURE — 1159F MED LIST DOCD IN RCRD: CPT | Mod: CPTII,S$GLB,, | Performed by: INTERNAL MEDICINE

## 2025-07-02 PROCEDURE — 3008F BODY MASS INDEX DOCD: CPT | Mod: CPTII,S$GLB,, | Performed by: INTERNAL MEDICINE

## 2025-07-02 PROCEDURE — 3079F DIAST BP 80-89 MM HG: CPT | Mod: CPTII,S$GLB,, | Performed by: INTERNAL MEDICINE

## 2025-07-02 PROCEDURE — 3074F SYST BP LT 130 MM HG: CPT | Mod: CPTII,S$GLB,, | Performed by: INTERNAL MEDICINE

## 2025-07-02 RX ORDER — PANTOPRAZOLE SODIUM 20 MG/1
20 TABLET, DELAYED RELEASE ORAL DAILY
Qty: 30 TABLET | Refills: 2 | Status: SHIPPED | OUTPATIENT
Start: 2025-07-02

## 2025-07-02 RX ORDER — LOSARTAN POTASSIUM 100 MG/1
100 TABLET ORAL DAILY
Qty: 90 TABLET | Refills: 3 | Status: SHIPPED | OUTPATIENT
Start: 2025-07-02

## 2025-07-02 RX ORDER — AMLODIPINE BESYLATE 10 MG/1
10 TABLET ORAL DAILY
Qty: 90 TABLET | Refills: 3 | Status: SHIPPED | OUTPATIENT
Start: 2025-07-02

## 2025-07-02 NOTE — PROGRESS NOTES
Latonya Maria Luz Mendoza  54 y.o. Black or  female  [id number]    Chief Complaint:  Chief Complaint   Patient presents with    Abdominal Pain     Pt states she went to er in June and pain just never got better       History of Present Illness:  History of Present Illness    CHIEF COMPLAINT:  Ms. Mendoza presents today for abdominal pain.    HISTORY OF PRESENT ILLNESS:  She reports ongoing left-sided abdominal pain since early June. Pain is severe and significantly impacts comfort - sitting causes discomfort requiring her to walk around for pain management. Pain worsens after eating, with food feeling like it's 'sitting' in the painful area. Pain persists even when not eating but intensifies post-meals. She experiences pain under her arm with deep breaths. She describes hunger causing nausea, with eating providing no relief. Pain is currently tolerable but persistent. She experiences persistent belching after eating, bloating, and abdominal discomfort. The pain location is similar to her previous pancreatitis episode, causing concern about recurrence.    GASTROINTESTINAL:  She reports altered bowel movements, initially experiencing loose, unusual stools, followed by dark and hard stools. She describes incomplete bowel evacuation requiring excessive wiping. She experiences nausea when hungry and after eating. She denies blood in stools, vomiting, fever, or current bowel movement changes.    EMERGENCY ROOM VISIT:  She visited the ER in early June due to severe vomiting after taking Pepto-Bismol. During the visit, she reported chest pain, which was evaluated with no significant findings. She denies ongoing chest pain and reports current symptoms differ from initial presentation.    MEDICATIONS:  She has been taking Aleve one tablet daily since early June for pain management but was advised to discontinue due to potential stomach inflammation. She uses Pepto-Bismol for acid reflux, which initially causes nausea  before symptoms settle. She ran out of Losartan three days before running out of Amlodipine. Home blood pressure typically runs 115/86 with good control when taking medications consistently.    DIET:  She currently consumes only boiled chicken prepared with onion, garlic, turmeric, and black pepper, reporting this as the only food providing some symptom relief. She is attempting to eat more healthily and taking vitamins including vitamin D, B12, and B1.    GYNECOLOGICAL:  She reports daily vaginal bleeding for several months, possibly since early year. The bleeding is characterized by stringy, dark-colored blood when wiping, not saturating a pad, distinct from her regular menstrual periods which remain regular with light-colored blood. She has a scheduled gynecology appointment with Dr. Navarro on August 13th.     MEDICAL HISTORY:  She has a history of pancreatitis at age 46 attributed to Lisinopril, which was discontinued. She denies ongoing pancreatitis complications since the initial episode.    RECENT SYMPTOMS:  She reports resolved bilateral leg swelling that improved within three days of starting vitamin B12, B1, and vitamin D supplementation. She notes unintentional changes in abdominal appearance, including decreased size and improved muscle tone, quest  ioning if these might be menopausal symptoms.    PREVENTIVE CARE:  She received a stool test packet for colon cancer screening over six months ago but has not completed it due to collection difficulties.         Review of Systems   Constitutional:  Negative for fever.   Cardiovascular:  Negative for chest pain.   Gastrointestinal:  Positive for abdominal pain, constipation, diarrhea, heartburn and nausea. Negative for blood in stool and vomiting.       Laboratory:  Lab Results   Component Value Date    WBC 12.99 (H) 06/03/2025    HGB 14.5 06/03/2025    HCT 43.2 06/03/2025     06/03/2025    CHOL 159 12/30/2023    TRIG 61 12/30/2023    HDL 44 12/30/2023     ALT 26 06/03/2025    AST 17 06/03/2025     06/03/2025    K 4.2 06/03/2025     06/03/2025    CREATININE 1.2 06/03/2025    BUN 11 06/03/2025    CO2 21 (L) 06/03/2025    TSH 2.753 12/30/2023    HGBA1C 6.2 (H) 04/10/2024     Lab Results   Component Value Date    LDLCALC 102.8 12/30/2023       History:  Past Medical History:   Diagnosis Date    Hypertension     Prediabetes        Medications:  Medications Ordered Prior to Encounter[1]    Allergies:  Review of patient's allergies indicates:  No Known Allergies    Exam:  Vitals:    07/02/25 0918   BP: 115/86   Pulse:    Temp:      Weight: 111 kg (244 lb 11.4 oz)   Body mass index is 43.35 kg/m².      Physical Exam    Vitals: Reviewed.  Constitutional: No acute distress. Well-developed. Not ill-appearing.  Eyes: No scleral icterus.  Cardiovascular: Normal rate   Pulmonary: Pulmonary effort is normal. No respiratory distress.   Abdomen: Soft. Tenderness in left upper quadrant of abdomen. Nondistended. Normoactive bowel sounds.  Extremities: No edema.  Skin: Warm. Dry.  Neurological: Alert and oriented to person, place, and time.  Psychiatric: Behavior normal.         Assessment:  The primary encounter diagnosis was Upper abdominal pain. Diagnoses of Bloating, Burping, Essential hypertension, Encounter for screening mammogram for malignant neoplasm of breast, and Colon cancer screening were also pertinent to this visit.    Assessment & Plan    R10.10 Upper abdominal pain  R14.0 Bloating  R14.2 Burping  I10 Essential hypertension  Z12.31 Encounter for screening mammogram for malignant neoplasm of breast  Z12.11 Colon cancer screening    UPPER ABDOMINAL PAIN:  - Suspect acid reflux based on symptoms of abdominal pain, belching, and worsening pain after eating.  - Ordered trial of acid reflux medication for 2 weeks to confirm diagnosis.  - Considered endoscopy as next step if symptoms persist after medication trial.  - Explained that acid reflux can cause the  symptoms patient is experiencing.  - Started new prescription medication for acid reflux, to be taken for 2 weeks.  - Explained that Pepto-Bismol can cause dark stools.  - Ordered abdominal US to rule out pancreatic issues due to history of pancreatitis and current abdominal tenderness.  - Explained that abdominal pain in women can sometimes be mistaken for heart issues.  - Advised discontinuation of Aleve (naproxen) due to potential for exacerbating stomach inflammation.  - Follow up in 2 weeks if symptoms do not improve with the prescribed acid reflux medication.  - Follow up in approximately 1 month for annual appointment with Elena (ALEXANDRA).    BLOATING:  - Suspect acid reflux based on symptoms of abdominal pain, belching, and worsening pain after eating.  - Ordered trial of acid reflux medication for 2 weeks to confirm diagnosis.  - Explained that acid reflux can cause the symptoms patient is experiencing.  - Started new prescription medication for acid reflux, to be taken for 2 weeks.    BURPING:  - Suspect acid reflux based on symptoms of abdominal pain, belching, and worsening pain after eating.  - Ordered trial of acid reflux medication for 2 weeks to confirm diagnosis.  - Explained that acid reflux can cause the symptoms patient is experiencing.  - Started new prescription medication for acid reflux, to be taken for 2 weeks.    ESSENTIAL HYPERTENSION:  - Continued Losartan.  - Continued Amlodipine.  - Follow up in approximately 1 month for annual appointment with Elena (ALEXANDRA).                        [1]   Current Outpatient Medications on File Prior to Visit   Medication Sig Dispense Refill    [DISCONTINUED] amLODIPine (NORVASC) 10 MG tablet Take 10 mg by mouth.      [DISCONTINUED] losartan (COZAAR) 100 MG tablet Take 1 tablet (100 mg total) by mouth once daily. 90 tablet 0    [DISCONTINUED] amLODIPine (NORVASC) 5 MG tablet Take 1 tablet (5 mg total) by mouth once daily. (Patient not taking: Reported on  7/2/2025) 30 tablet 1    [DISCONTINUED] hydroCHLOROthiazide (HYDRODIURIL) 12.5 MG Tab Take 1 tablet (12.5 mg total) by mouth once daily. (Patient not taking: Reported on 7/2/2025) 30 tablet 1     No current facility-administered medications on file prior to visit.

## 2025-07-07 ENCOUNTER — HOSPITAL ENCOUNTER (OUTPATIENT)
Dept: RADIOLOGY | Facility: HOSPITAL | Age: 55
Discharge: HOME | End: 2025-07-07
Attending: INTERNAL MEDICINE
Payer: COMMERCIAL

## 2025-07-07 DIAGNOSIS — R10.10 UPPER ABDOMINAL PAIN: ICD-10-CM

## 2025-07-07 PROCEDURE — 76700 US EXAM ABDOM COMPLETE: CPT | Mod: TC

## 2025-07-07 PROCEDURE — 76700 US EXAM ABDOM COMPLETE: CPT | Mod: 26,,, | Performed by: RADIOLOGY

## 2025-07-14 ENCOUNTER — HOSPITAL ENCOUNTER (OUTPATIENT)
Dept: RADIOLOGY | Facility: HOSPITAL | Age: 55
Discharge: HOME | End: 2025-07-14
Attending: INTERNAL MEDICINE
Payer: COMMERCIAL

## 2025-07-14 DIAGNOSIS — Z12.31 ENCOUNTER FOR SCREENING MAMMOGRAM FOR MALIGNANT NEOPLASM OF BREAST: ICD-10-CM

## 2025-07-14 PROCEDURE — 77063 BREAST TOMOSYNTHESIS BI: CPT | Mod: 26,,, | Performed by: RADIOLOGY

## 2025-07-14 PROCEDURE — 77067 SCR MAMMO BI INCL CAD: CPT | Mod: 26,,, | Performed by: RADIOLOGY

## 2025-07-14 PROCEDURE — 77063 BREAST TOMOSYNTHESIS BI: CPT | Mod: TC

## 2025-08-13 ENCOUNTER — OFFICE VISIT (OUTPATIENT)
Dept: OBSTETRICS AND GYNECOLOGY | Facility: CLINIC | Age: 55
End: 2025-08-13
Payer: COMMERCIAL

## 2025-08-13 VITALS
BODY MASS INDEX: 43.6 KG/M2 | HEIGHT: 63 IN | SYSTOLIC BLOOD PRESSURE: 130 MMHG | WEIGHT: 246.06 LBS | DIASTOLIC BLOOD PRESSURE: 90 MMHG

## 2025-08-13 DIAGNOSIS — N92.1 METRORRHAGIA: ICD-10-CM

## 2025-08-13 DIAGNOSIS — Z01.419 ENCOUNTER FOR GYNECOLOGICAL EXAMINATION (GENERAL) (ROUTINE) WITHOUT ABNORMAL FINDINGS: Primary | ICD-10-CM

## 2025-08-13 DIAGNOSIS — Z12.4 SCREENING FOR CERVICAL CANCER: ICD-10-CM

## 2025-08-13 DIAGNOSIS — N90.89 VULVAR IRRITATION: ICD-10-CM

## 2025-08-13 DIAGNOSIS — Z12.31 SCREENING MAMMOGRAM, ENCOUNTER FOR: ICD-10-CM

## 2025-08-13 PROCEDURE — 99999 PR PBB SHADOW E&M-EST. PATIENT-LVL III: CPT | Mod: PBBFAC,,, | Performed by: OBSTETRICS & GYNECOLOGY

## 2025-08-13 PROCEDURE — 87624 HPV HI-RISK TYP POOLED RSLT: CPT | Performed by: OBSTETRICS & GYNECOLOGY

## 2025-08-13 PROCEDURE — 87491 CHLMYD TRACH DNA AMP PROBE: CPT | Performed by: OBSTETRICS & GYNECOLOGY

## 2025-08-13 PROCEDURE — 81515 NFCT DS BV&VAGINITIS DNA ALG: CPT | Performed by: OBSTETRICS & GYNECOLOGY

## 2025-08-13 RX ORDER — NYSTATIN AND TRIAMCINOLONE ACETONIDE 100000; 1 [USP'U]/G; MG/G
CREAM TOPICAL
Qty: 60 G | Refills: 1 | Status: SHIPPED | OUTPATIENT
Start: 2025-08-13 | End: 2026-08-13

## 2025-08-15 ENCOUNTER — OFFICE VISIT (OUTPATIENT)
Dept: INTERNAL MEDICINE | Facility: CLINIC | Age: 55
End: 2025-08-15
Payer: COMMERCIAL

## 2025-08-15 VITALS
DIASTOLIC BLOOD PRESSURE: 82 MMHG | WEIGHT: 246.25 LBS | SYSTOLIC BLOOD PRESSURE: 136 MMHG | OXYGEN SATURATION: 98 % | HEART RATE: 86 BPM | HEIGHT: 63 IN | RESPIRATION RATE: 20 BRPM | BODY MASS INDEX: 43.63 KG/M2 | TEMPERATURE: 96 F

## 2025-08-15 DIAGNOSIS — Z00.00 ANNUAL PHYSICAL EXAM: Primary | ICD-10-CM

## 2025-08-15 DIAGNOSIS — L02.92 RECURRENT BOILS: ICD-10-CM

## 2025-08-15 DIAGNOSIS — L02.413 ABSCESS OF RIGHT ARM: ICD-10-CM

## 2025-08-15 DIAGNOSIS — R73.03 PREDIABETES: ICD-10-CM

## 2025-08-15 DIAGNOSIS — I10 ESSENTIAL HYPERTENSION: ICD-10-CM

## 2025-08-15 LAB
BACTERIAL VAGINOSIS DNA (OHS): NOT DETECTED
C TRACH DNA SPEC QL NAA+PROBE: NOT DETECTED
CANDIDA GLABRATA/KRUSEI DNA (OHS): NOT DETECTED
CANDIDA SPECIES DNA (OHS): NOT DETECTED
CTGC SOURCE (OHS) ORD-325: NORMAL
N GONORRHOEA DNA UR QL NAA+PROBE: NOT DETECTED
TRICHOMONAS VAGINALIS DNA (OHS): NOT DETECTED

## 2025-08-15 PROCEDURE — 99999 PR PBB SHADOW E&M-EST. PATIENT-LVL IV: CPT | Mod: PBBFAC,,, | Performed by: PHYSICIAN ASSISTANT

## 2025-08-15 RX ORDER — MUPIROCIN 20 MG/G
OINTMENT TOPICAL 3 TIMES DAILY
Qty: 30 G | Refills: 0 | Status: SHIPPED | OUTPATIENT
Start: 2025-08-15 | End: 2025-08-25

## 2025-08-15 RX ORDER — SULFAMETHOXAZOLE AND TRIMETHOPRIM 800; 160 MG/1; MG/1
1 TABLET ORAL 2 TIMES DAILY
Qty: 14 TABLET | Refills: 0 | Status: SHIPPED | OUTPATIENT
Start: 2025-08-15 | End: 2025-08-22

## 2025-08-20 ENCOUNTER — HOSPITAL ENCOUNTER (OUTPATIENT)
Dept: RADIOLOGY | Facility: HOSPITAL | Age: 55
Discharge: HOME | End: 2025-08-20
Attending: OBSTETRICS & GYNECOLOGY
Payer: COMMERCIAL

## 2025-08-20 DIAGNOSIS — N92.1 METRORRHAGIA: ICD-10-CM

## 2025-08-20 PROCEDURE — 76856 US EXAM PELVIC COMPLETE: CPT | Mod: 26,,, | Performed by: RADIOLOGY

## 2025-08-20 PROCEDURE — 76830 TRANSVAGINAL US NON-OB: CPT | Mod: 26,,, | Performed by: RADIOLOGY

## 2025-08-20 PROCEDURE — 76856 US EXAM PELVIC COMPLETE: CPT | Mod: TC

## 2025-08-27 RX ORDER — AMLODIPINE BESYLATE 10 MG/1
10 TABLET ORAL DAILY
Qty: 90 TABLET | Refills: 3 | Status: SHIPPED | OUTPATIENT
Start: 2025-08-27

## (undated) DEVICE — DRAPE ORTH SPLIT 77X108IN

## (undated) DEVICE — SWAB CULTURETTE II DUAL

## (undated) DEVICE — GLOVE BIOGEL 7.5

## (undated) DEVICE — CATH ALL PURPOSE URETHRAL 14FR

## (undated) DEVICE — DRAIN PENROSE XRAY 18 X 1/4 ST

## (undated) DEVICE — MANIFOLD 4 PORT

## (undated) DEVICE — SOL NS 1000CC

## (undated) DEVICE — PACK BASIC SETUP SC BR

## (undated) DEVICE — GOWN POLY REINF BRTH SLV XL

## (undated) DEVICE — COVER LIGHT HANDLE 80/CA